# Patient Record
Sex: FEMALE | Race: WHITE | NOT HISPANIC OR LATINO | Employment: OTHER | URBAN - METROPOLITAN AREA
[De-identification: names, ages, dates, MRNs, and addresses within clinical notes are randomized per-mention and may not be internally consistent; named-entity substitution may affect disease eponyms.]

---

## 2017-03-09 ENCOUNTER — HOSPITAL ENCOUNTER (OUTPATIENT)
Dept: CARDIOLOGY | Facility: HOSPITAL | Age: 72
Discharge: HOME | End: 2017-03-09
Attending: INTERNAL MEDICINE | Admitting: INTERNAL MEDICINE

## 2017-03-10 ENCOUNTER — OFFICE (AMBULATORY)
Dept: URBAN - METROPOLITAN AREA CLINIC 64 | Facility: CLINIC | Age: 72
End: 2017-03-10

## 2017-03-10 VITALS
SYSTOLIC BLOOD PRESSURE: 132 MMHG | HEART RATE: 56 BPM | HEIGHT: 61 IN | DIASTOLIC BLOOD PRESSURE: 80 MMHG | WEIGHT: 164 LBS

## 2017-03-10 DIAGNOSIS — R19.5 OTHER FECAL ABNORMALITIES: ICD-10-CM

## 2017-03-10 DIAGNOSIS — K59.1 FUNCTIONAL DIARRHEA: ICD-10-CM

## 2017-03-10 DIAGNOSIS — Z80.0 FAMILY HISTORY OF MALIGNANT NEOPLASM OF DIGESTIVE ORGANS: ICD-10-CM

## 2017-03-10 PROCEDURE — 99213 OFFICE O/P EST LOW 20 MIN: CPT | Performed by: NURSE PRACTITIONER

## 2017-04-05 ENCOUNTER — ON CAMPUS - OUTPATIENT (AMBULATORY)
Dept: URBAN - METROPOLITAN AREA HOSPITAL 2 | Facility: HOSPITAL | Age: 72
End: 2017-04-05

## 2017-04-05 ENCOUNTER — HOSPITAL ENCOUNTER (OUTPATIENT)
Dept: OTHER | Facility: HOSPITAL | Age: 72
Setting detail: SPECIMEN
Discharge: HOME | End: 2017-04-05
Attending: INTERNAL MEDICINE | Admitting: INTERNAL MEDICINE

## 2017-04-05 ENCOUNTER — OFFICE (AMBULATORY)
Dept: URBAN - METROPOLITAN AREA CLINIC 64 | Facility: CLINIC | Age: 72
End: 2017-04-05

## 2017-04-05 VITALS
DIASTOLIC BLOOD PRESSURE: 93 MMHG | SYSTOLIC BLOOD PRESSURE: 118 MMHG | RESPIRATION RATE: 16 BRPM | SYSTOLIC BLOOD PRESSURE: 139 MMHG | HEIGHT: 61 IN | SYSTOLIC BLOOD PRESSURE: 123 MMHG | HEART RATE: 63 BPM | DIASTOLIC BLOOD PRESSURE: 81 MMHG | HEART RATE: 58 BPM | DIASTOLIC BLOOD PRESSURE: 78 MMHG | OXYGEN SATURATION: 100 % | HEART RATE: 64 BPM | HEART RATE: 60 BPM | TEMPERATURE: 97.5 F | DIASTOLIC BLOOD PRESSURE: 42 MMHG | SYSTOLIC BLOOD PRESSURE: 130 MMHG | RESPIRATION RATE: 18 BRPM | SYSTOLIC BLOOD PRESSURE: 140 MMHG | OXYGEN SATURATION: 96 % | DIASTOLIC BLOOD PRESSURE: 87 MMHG | DIASTOLIC BLOOD PRESSURE: 79 MMHG | DIASTOLIC BLOOD PRESSURE: 84 MMHG | HEART RATE: 59 BPM | SYSTOLIC BLOOD PRESSURE: 114 MMHG | SYSTOLIC BLOOD PRESSURE: 138 MMHG | OXYGEN SATURATION: 97 % | WEIGHT: 158 LBS | DIASTOLIC BLOOD PRESSURE: 85 MMHG

## 2017-04-05 DIAGNOSIS — D12.0 BENIGN NEOPLASM OF CECUM: ICD-10-CM

## 2017-04-05 DIAGNOSIS — K63.5 POLYP OF COLON: ICD-10-CM

## 2017-04-05 DIAGNOSIS — D12.4 BENIGN NEOPLASM OF DESCENDING COLON: ICD-10-CM

## 2017-04-05 DIAGNOSIS — Z86.010 PERSONAL HISTORY OF COLONIC POLYPS: ICD-10-CM

## 2017-04-05 DIAGNOSIS — K57.30 DIVERTICULOSIS OF LARGE INTESTINE WITHOUT PERFORATION OR ABS: ICD-10-CM

## 2017-04-05 PROBLEM — D12.3 BENIGN NEOPLASM OF TRANSVERSE COLON: Status: ACTIVE | Noted: 2017-04-05

## 2017-04-05 LAB
GI HISTOLOGY: A. UNSPECIFIED: (no result)
GI HISTOLOGY: B. UNSPECIFIED: (no result)
GI HISTOLOGY: C. UNSPECIFIED: (no result)
GI HISTOLOGY: PDF REPORT: (no result)

## 2017-04-05 PROCEDURE — 88305 TISSUE EXAM BY PATHOLOGIST: CPT | Mod: 26 | Performed by: INTERNAL MEDICINE

## 2017-04-05 PROCEDURE — 45385 COLONOSCOPY W/LESION REMOVAL: CPT | Mod: PT | Performed by: INTERNAL MEDICINE

## 2017-04-05 RX ADMIN — PROPOFOL: 10 INJECTION, EMULSION INTRAVENOUS at 07:36

## 2017-05-15 ENCOUNTER — HOSPITAL ENCOUNTER (OUTPATIENT)
Dept: OTHER | Facility: HOSPITAL | Age: 72
Discharge: HOME | End: 2017-05-15
Attending: PREVENTIVE MEDICINE | Admitting: PREVENTIVE MEDICINE

## 2017-06-30 ENCOUNTER — HOSPITAL ENCOUNTER (OUTPATIENT)
Dept: CARDIOLOGY | Facility: HOSPITAL | Age: 72
Discharge: HOME | End: 2017-06-30
Attending: PHYSICIAN ASSISTANT | Admitting: PHYSICIAN ASSISTANT

## 2017-06-30 LAB — CREAT BLDA-MCNC: 0.8 MG/DL (ref 0.6–1.3)

## 2017-09-18 ENCOUNTER — OFFICE (AMBULATORY)
Dept: URBAN - METROPOLITAN AREA CLINIC 64 | Facility: CLINIC | Age: 72
End: 2017-09-18
Payer: MEDICAID

## 2017-09-18 VITALS
SYSTOLIC BLOOD PRESSURE: 122 MMHG | HEART RATE: 57 BPM | HEIGHT: 61 IN | WEIGHT: 160 LBS | DIASTOLIC BLOOD PRESSURE: 75 MMHG

## 2017-09-18 DIAGNOSIS — K21.9 GASTRO-ESOPHAGEAL REFLUX DISEASE WITHOUT ESOPHAGITIS: ICD-10-CM

## 2017-09-18 PROCEDURE — 99212 OFFICE O/P EST SF 10 MIN: CPT | Performed by: INTERNAL MEDICINE

## 2017-09-18 RX ORDER — PANTOPRAZOLE SODIUM 20 MG/1
20 TABLET, DELAYED RELEASE ORAL
Qty: 90 | Refills: 0 | Status: COMPLETED
Start: 2017-09-18 | End: 2020-01-01

## 2017-10-24 ENCOUNTER — HOSPITAL ENCOUNTER (OUTPATIENT)
Dept: CARDIOLOGY | Facility: HOSPITAL | Age: 72
Discharge: HOME | End: 2017-10-24
Attending: PHYSICIAN ASSISTANT | Admitting: PHYSICIAN ASSISTANT

## 2017-11-08 ENCOUNTER — HOSPITAL ENCOUNTER (OUTPATIENT)
Dept: PHYSICAL THERAPY | Facility: HOSPITAL | Age: 72
Setting detail: RECURRING SERIES
Discharge: HOME | End: 2018-01-09
Attending: PHYSICIAN ASSISTANT | Admitting: PHYSICIAN ASSISTANT

## 2017-11-13 ENCOUNTER — HOSPITAL ENCOUNTER (OUTPATIENT)
Dept: CARDIOLOGY | Facility: HOSPITAL | Age: 72
Discharge: HOME | End: 2017-11-13
Attending: INTERNAL MEDICINE | Admitting: INTERNAL MEDICINE

## 2017-11-13 ENCOUNTER — HOSPITAL ENCOUNTER (OUTPATIENT)
Dept: OTHER | Facility: HOSPITAL | Age: 72
Discharge: HOME | End: 2017-11-13
Attending: PREVENTIVE MEDICINE | Admitting: PREVENTIVE MEDICINE

## 2018-07-20 ENCOUNTER — HOSPITAL ENCOUNTER (OUTPATIENT)
Dept: FAMILY MEDICINE CLINIC | Facility: CLINIC | Age: 73
Setting detail: SPECIMEN
Discharge: HOME | End: 2018-07-20
Attending: PREVENTIVE MEDICINE | Admitting: PREVENTIVE MEDICINE

## 2018-07-20 LAB
BASOPHILS # BLD AUTO: 0 10*3/UL (ref 0–0.2)
BASOPHILS NFR BLD AUTO: 1 % (ref 0–2)
CHOLEST SERPL-MCNC: 123 MG/DL
CHOLEST/HDLC SERPL: 3.9 {RATIO}
CONV LDL CHOLESTEROL DIRECT: 69 MG/DL (ref 0–100)
CRP SERPL-MCNC: 0.13 MG/DL (ref 0–0.7)
DIFFERENTIAL METHOD BLD: (no result)
EOSINOPHIL # BLD AUTO: 0.1 10*3/UL (ref 0–0.3)
EOSINOPHIL # BLD AUTO: 1 % (ref 0–3)
ERYTHROCYTE [DISTWIDTH] IN BLOOD BY AUTOMATED COUNT: 12.8 % (ref 11.5–14.5)
ERYTHROCYTE [SEDIMENTATION RATE] IN BLOOD BY WESTERGREN METHOD: 11 MM/HR (ref 0–30)
HCT VFR BLD AUTO: 43.7 % (ref 35–49)
HDLC SERPL-MCNC: 32 MG/DL
HGB BLD-MCNC: 14.8 G/DL (ref 12–15)
LDLC/HDLC SERPL: 2.2 {RATIO}
LIPID INTERPRETATION: ABNORMAL
LYMPHOCYTES # BLD AUTO: 1.8 10*3/UL (ref 0.8–4.8)
LYMPHOCYTES NFR BLD AUTO: 31 % (ref 18–42)
MCH RBC QN AUTO: 30.9 PG (ref 26–32)
MCHC RBC AUTO-ENTMCNC: 33.9 G/DL (ref 32–36)
MCV RBC AUTO: 91.1 FL (ref 80–94)
MONOCYTES # BLD AUTO: 0.6 10*3/UL (ref 0.1–1.3)
MONOCYTES NFR BLD AUTO: 10 % (ref 2–11)
NEUTROPHILS # BLD AUTO: 3.3 10*3/UL (ref 2.3–8.6)
NEUTROPHILS NFR BLD AUTO: 57 % (ref 50–75)
NRBC BLD AUTO-RTO: 0 /100{WBCS}
NRBC/RBC NFR BLD MANUAL: 0 10*3/UL
PLATELET # BLD AUTO: 186 10*3/UL (ref 150–450)
PMV BLD AUTO: 10.3 FL (ref 7.4–10.4)
RBC # BLD AUTO: 4.79 10*6/UL (ref 4–5.4)
TRIGL SERPL-MCNC: 122 MG/DL
VLDLC SERPL CALC-MCNC: 22.2 MG/DL
WBC # BLD AUTO: 5.8 10*3/UL (ref 4.5–11.5)

## 2018-07-23 LAB
ANA SER QL IA: ABNORMAL
CHROMATIN AB SERPL-ACNC: <20 [IU]/ML (ref 0–20)

## 2018-07-25 ENCOUNTER — HOSPITAL ENCOUNTER (OUTPATIENT)
Dept: GENERAL RADIOLOGY | Facility: HOSPITAL | Age: 73
Discharge: HOME | End: 2018-07-25
Attending: PREVENTIVE MEDICINE | Admitting: PREVENTIVE MEDICINE

## 2018-09-19 ENCOUNTER — OFFICE (AMBULATORY)
Dept: URBAN - METROPOLITAN AREA CLINIC 64 | Facility: CLINIC | Age: 73
End: 2018-09-19

## 2018-09-19 VITALS
SYSTOLIC BLOOD PRESSURE: 109 MMHG | DIASTOLIC BLOOD PRESSURE: 68 MMHG | HEIGHT: 61 IN | WEIGHT: 175 LBS | HEART RATE: 62 BPM

## 2018-09-19 DIAGNOSIS — K21.9 GASTRO-ESOPHAGEAL REFLUX DISEASE WITHOUT ESOPHAGITIS: ICD-10-CM

## 2018-09-19 PROCEDURE — 99213 OFFICE O/P EST LOW 20 MIN: CPT | Performed by: INTERNAL MEDICINE

## 2018-09-19 RX ORDER — PANTOPRAZOLE SODIUM 20 MG/1
20 TABLET, DELAYED RELEASE ORAL
Qty: 90 | Refills: 0 | Status: COMPLETED
Start: 2017-09-18 | End: 2020-01-01

## 2018-10-24 ENCOUNTER — HOSPITAL ENCOUNTER (OUTPATIENT)
Dept: CARDIOLOGY | Facility: HOSPITAL | Age: 73
Discharge: HOME | End: 2018-10-24
Attending: PHYSICIAN ASSISTANT | Admitting: PHYSICIAN ASSISTANT

## 2018-11-06 ENCOUNTER — HOSPITAL ENCOUNTER (OUTPATIENT)
Dept: LAB | Facility: HOSPITAL | Age: 73
Discharge: HOME | End: 2018-11-06
Attending: INTERNAL MEDICINE | Admitting: INTERNAL MEDICINE

## 2018-11-06 LAB
ALBUMIN SERPL-MCNC: 4.1 G/DL (ref 3.5–4.8)
ALBUMIN/GLOB SERPL: 1.3 {RATIO} (ref 1–1.7)
ALP SERPL-CCNC: 139 IU/L (ref 32–91)
ALT SERPL-CCNC: 18 IU/L (ref 14–54)
ANION GAP SERPL CALC-SCNC: 13.4 MMOL/L (ref 10–20)
AST SERPL-CCNC: 20 IU/L (ref 15–41)
BASOPHILS # BLD AUTO: 0 10*3/UL (ref 0–0.2)
BASOPHILS NFR BLD AUTO: 1 % (ref 0–2)
BILIRUB SERPL-MCNC: 1.3 MG/DL (ref 0.3–1.2)
BILIRUB UR QL STRIP: NEGATIVE MG/DL
BUN SERPL-MCNC: 10 MG/DL (ref 8–20)
BUN/CREAT SERPL: 10 (ref 5.4–26.2)
CALCIUM SERPL-MCNC: 9.6 MG/DL (ref 8.9–10.3)
CASTS URNS QL MICRO: NORMAL /[LPF]
CHLORIDE SERPL-SCNC: 105 MMOL/L (ref 101–111)
COLOR UR: YELLOW
CONV BACTERIA IN URINE MICRO: NEGATIVE
CONV CLARITY OF URINE: CLEAR
CONV CO2: 28 MMOL/L (ref 22–32)
CONV HYALINE CASTS IN URINE MICRO: 1 /[LPF] (ref 0–5)
CONV PROTEIN IN URINE BY AUTOMATED TEST STRIP: NEGATIVE MG/DL
CONV SMALL ROUND CELLS: NORMAL /[HPF]
CONV TOTAL PROTEIN: 7.3 G/DL (ref 6.1–7.9)
CONV UROBILINOGEN IN URINE BY AUTOMATED TEST STRIP: 0.2 MG/DL
CREAT UR-MCNC: 1 MG/DL (ref 0.4–1)
CRP SERPL-MCNC: 0.18 MG/DL (ref 0–0.7)
CULTURE INDICATED?: NORMAL
DIFFERENTIAL METHOD BLD: (no result)
EOSINOPHIL # BLD AUTO: 0.1 10*3/UL (ref 0–0.3)
EOSINOPHIL # BLD AUTO: 2 % (ref 0–3)
ERYTHROCYTE [DISTWIDTH] IN BLOOD BY AUTOMATED COUNT: 12.5 % (ref 11.5–14.5)
ERYTHROCYTE [SEDIMENTATION RATE] IN BLOOD BY WESTERGREN METHOD: 11 MM/HR (ref 0–30)
GLOBULIN UR ELPH-MCNC: 3.2 G/DL (ref 2.5–3.8)
GLUCOSE SERPL-MCNC: 96 MG/DL (ref 65–99)
GLUCOSE UR QL: NEGATIVE MG/DL
HAV IGM SERPL QL IA: NONREACTIVE
HBV CORE IGM SERPL QL IA: NONREACTIVE
HBV SURFACE AG SERPL QL IA: NONREACTIVE
HCT VFR BLD AUTO: 45.8 % (ref 35–49)
HCV AB SER DONR QL: ABNORMAL
HGB BLD-MCNC: 15.4 G/DL (ref 12–15)
HGB UR QL STRIP: NEGATIVE
KETONES UR QL STRIP: NEGATIVE MG/DL
LEUKOCYTE ESTERASE UR QL STRIP: NEGATIVE
LYMPHOCYTES # BLD AUTO: 2.1 10*3/UL (ref 0.8–4.8)
LYMPHOCYTES NFR BLD AUTO: 32 % (ref 18–42)
MCH RBC QN AUTO: 30.5 PG (ref 26–32)
MCHC RBC AUTO-ENTMCNC: 33.6 G/DL (ref 32–36)
MCV RBC AUTO: 90.9 FL (ref 80–94)
MONOCYTES # BLD AUTO: 0.7 10*3/UL (ref 0.1–1.3)
MONOCYTES NFR BLD AUTO: 10 % (ref 2–11)
NEUTROPHILS # BLD AUTO: 3.7 10*3/UL (ref 2.3–8.6)
NEUTROPHILS NFR BLD AUTO: 55 % (ref 50–75)
NITRITE UR QL STRIP: NEGATIVE
NRBC BLD AUTO-RTO: 0 /100{WBCS}
NRBC/RBC NFR BLD MANUAL: 0 10*3/UL
PH UR STRIP.AUTO: 5 [PH] (ref 4.5–8)
PLATELET # BLD AUTO: 190 10*3/UL (ref 150–450)
PMV BLD AUTO: 9.8 FL (ref 7.4–10.4)
POTASSIUM SERPL-SCNC: 4.4 MMOL/L (ref 3.6–5.1)
RBC # BLD AUTO: 5.04 10*6/UL (ref 4–5.4)
RBC #/AREA URNS HPF: 0 /[HPF] (ref 0–3)
SODIUM SERPL-SCNC: 142 MMOL/L (ref 136–144)
SP GR UR: 1.01 (ref 1–1.03)
SPERM URNS QL MICRO: NORMAL /[HPF]
SQUAMOUS SPT QL MICRO: 0 /[HPF] (ref 0–5)
UNIDENT CRYS URNS QL MICRO: NORMAL /[HPF]
WBC # BLD AUTO: 6.7 10*3/UL (ref 4.5–11.5)
WBC #/AREA URNS HPF: 1 /[HPF] (ref 0–5)
YEAST SPEC QL WET PREP: NORMAL /[HPF]

## 2018-11-06 PROCEDURE — 86481 TB AG RESPONSE T-CELL SUSP: CPT

## 2018-11-07 ENCOUNTER — LAB REQUISITION (OUTPATIENT)
Dept: LAB | Facility: HOSPITAL | Age: 73
End: 2018-11-07

## 2018-11-07 DIAGNOSIS — Z00.00 ROUTINE GENERAL MEDICAL EXAMINATION AT A HEALTH CARE FACILITY: ICD-10-CM

## 2018-11-07 LAB
CONV HIV-1/ HIV-2: NORMAL
CONV HIV-1/ HIV-2: NORMAL

## 2018-11-08 LAB
TSPOT INTERPRETATION: ABNORMAL
TSPOT INTERPRETATION: POSITIVE
TSPOT NIL CONTROL INTERPRETATION: ABNORMAL
TSPOT PANEL A: 1
TSPOT PANEL B: 18
TSPOT POS CONTROL INTERPRETATION: ABNORMAL

## 2018-11-10 LAB
ANTI-CARDIOLIPIN IGG ANTIBODY: <14 GPL
INR PPP: 1.1
PTT LA MIX: 35 SEC
SCREEN DRVVT: 42 SEC

## 2018-11-13 ENCOUNTER — HOSPITAL ENCOUNTER (OUTPATIENT)
Dept: OTHER | Facility: HOSPITAL | Age: 73
Discharge: HOME | End: 2018-11-13
Attending: PREVENTIVE MEDICINE | Admitting: PREVENTIVE MEDICINE

## 2019-01-01 ENCOUNTER — HOSPITAL ENCOUNTER (INPATIENT)
Facility: HOSPITAL | Age: 74
LOS: 2 days | Discharge: HOME | End: 2019-09-20
Attending: EMERGENCY MEDICINE | Admitting: HOSPITALIST

## 2019-01-01 ENCOUNTER — READMISSION MANAGEMENT (OUTPATIENT)
Dept: CALL CENTER | Facility: HOSPITAL | Age: 74
End: 2019-01-01

## 2019-01-01 ENCOUNTER — TELEPHONE (OUTPATIENT)
Dept: CARDIOLOGY | Facility: CLINIC | Age: 74
End: 2019-01-01

## 2019-01-01 ENCOUNTER — OFFICE VISIT (OUTPATIENT)
Dept: CARDIOLOGY | Facility: CLINIC | Age: 74
End: 2019-01-01

## 2019-01-01 ENCOUNTER — CLINICAL SUPPORT NO REQUIREMENTS (OUTPATIENT)
Dept: CARDIOLOGY | Facility: CLINIC | Age: 74
End: 2019-01-01

## 2019-01-01 ENCOUNTER — TRANSCRIBE ORDERS (OUTPATIENT)
Dept: LAB | Facility: HOSPITAL | Age: 74
End: 2019-01-01

## 2019-01-01 ENCOUNTER — HOSPITAL ENCOUNTER (OUTPATIENT)
Facility: HOSPITAL | Age: 74
Setting detail: OBSERVATION
Discharge: HOME | End: 2019-09-25
Attending: EMERGENCY MEDICINE | Admitting: INTERNAL MEDICINE

## 2019-01-01 ENCOUNTER — HOSPITAL ENCOUNTER (OUTPATIENT)
Dept: CARDIOLOGY | Facility: HOSPITAL | Age: 74
Discharge: HOME | End: 2019-10-11

## 2019-01-01 ENCOUNTER — APPOINTMENT (OUTPATIENT)
Dept: MRI IMAGING | Facility: HOSPITAL | Age: 74
End: 2019-01-01

## 2019-01-01 ENCOUNTER — OFFICE (AMBULATORY)
Dept: URBAN - METROPOLITAN AREA CLINIC 64 | Facility: CLINIC | Age: 74
End: 2019-01-01

## 2019-01-01 ENCOUNTER — APPOINTMENT (OUTPATIENT)
Dept: CARDIOLOGY | Facility: HOSPITAL | Age: 74
End: 2019-01-01

## 2019-01-01 ENCOUNTER — APPOINTMENT (OUTPATIENT)
Dept: RESPIRATORY THERAPY | Facility: HOSPITAL | Age: 74
End: 2019-01-01

## 2019-01-01 ENCOUNTER — APPOINTMENT (OUTPATIENT)
Dept: GENERAL RADIOLOGY | Facility: HOSPITAL | Age: 74
End: 2019-01-01

## 2019-01-01 ENCOUNTER — LAB (OUTPATIENT)
Dept: LAB | Facility: HOSPITAL | Age: 74
End: 2019-01-01

## 2019-01-01 ENCOUNTER — HOSPITAL ENCOUNTER (OUTPATIENT)
Facility: HOSPITAL | Age: 74
Discharge: HOME | End: 2019-10-12
Attending: INTERNAL MEDICINE | Admitting: INTERNAL MEDICINE

## 2019-01-01 ENCOUNTER — OFFICE VISIT (OUTPATIENT)
Dept: RHEUMATOLOGY | Facility: CLINIC | Age: 74
End: 2019-01-01

## 2019-01-01 ENCOUNTER — TELEPHONE (OUTPATIENT)
Dept: RHEUMATOLOGY | Facility: CLINIC | Age: 74
End: 2019-01-01

## 2019-01-01 VITALS
TEMPERATURE: 97.4 F | DIASTOLIC BLOOD PRESSURE: 81 MMHG | HEART RATE: 49 BPM | OXYGEN SATURATION: 98 % | BODY MASS INDEX: 30.95 KG/M2 | SYSTOLIC BLOOD PRESSURE: 150 MMHG | HEIGHT: 62 IN | WEIGHT: 168.21 LBS | RESPIRATION RATE: 16 BRPM

## 2019-01-01 VITALS
SYSTOLIC BLOOD PRESSURE: 154 MMHG | OXYGEN SATURATION: 97 % | DIASTOLIC BLOOD PRESSURE: 84 MMHG | HEART RATE: 42 BPM | HEIGHT: 62 IN | RESPIRATION RATE: 18 BRPM | TEMPERATURE: 97.6 F | WEIGHT: 164.46 LBS | BODY MASS INDEX: 30.26 KG/M2

## 2019-01-01 VITALS
DIASTOLIC BLOOD PRESSURE: 78 MMHG | BODY MASS INDEX: 31.37 KG/M2 | OXYGEN SATURATION: 96 % | HEART RATE: 46 BPM | SYSTOLIC BLOOD PRESSURE: 161 MMHG | WEIGHT: 171.5 LBS

## 2019-01-01 VITALS
SYSTOLIC BLOOD PRESSURE: 143 MMHG | HEART RATE: 43 BPM | DIASTOLIC BLOOD PRESSURE: 80 MMHG | HEIGHT: 61 IN | WEIGHT: 164 LBS

## 2019-01-01 VITALS
HEIGHT: 62 IN | OXYGEN SATURATION: 96 % | WEIGHT: 170 LBS | BODY MASS INDEX: 31.28 KG/M2 | SYSTOLIC BLOOD PRESSURE: 156 MMHG | HEART RATE: 80 BPM | DIASTOLIC BLOOD PRESSURE: 101 MMHG

## 2019-01-01 VITALS
SYSTOLIC BLOOD PRESSURE: 154 MMHG | HEART RATE: 90 BPM | OXYGEN SATURATION: 96 % | RESPIRATION RATE: 16 BRPM | HEIGHT: 62 IN | BODY MASS INDEX: 30.87 KG/M2 | WEIGHT: 167.77 LBS | TEMPERATURE: 98.1 F | DIASTOLIC BLOOD PRESSURE: 72 MMHG

## 2019-01-01 VITALS
HEIGHT: 62 IN | HEART RATE: 67 BPM | DIASTOLIC BLOOD PRESSURE: 71 MMHG | WEIGHT: 176.25 LBS | OXYGEN SATURATION: 95 % | SYSTOLIC BLOOD PRESSURE: 114 MMHG | BODY MASS INDEX: 32.44 KG/M2

## 2019-01-01 VITALS
BODY MASS INDEX: 32.24 KG/M2 | SYSTOLIC BLOOD PRESSURE: 142 MMHG | DIASTOLIC BLOOD PRESSURE: 70 MMHG | HEART RATE: 44 BPM | HEIGHT: 62 IN

## 2019-01-01 DIAGNOSIS — Z95.0 PRESENCE OF PERMANENT CARDIAC PACEMAKER: ICD-10-CM

## 2019-01-01 DIAGNOSIS — I10 ESSENTIAL HYPERTENSION: ICD-10-CM

## 2019-01-01 DIAGNOSIS — M13.80 CLIMACTERIC ARTHRITIS: ICD-10-CM

## 2019-01-01 DIAGNOSIS — R55 SYNCOPE, UNSPECIFIED SYNCOPE TYPE: Primary | ICD-10-CM

## 2019-01-01 DIAGNOSIS — Z95.818 STATUS POST PLACEMENT OF IMPLANTABLE LOOP RECORDER: ICD-10-CM

## 2019-01-01 DIAGNOSIS — E78.2 MIXED HYPERLIPIDEMIA: ICD-10-CM

## 2019-01-01 DIAGNOSIS — R42 DIZZINESS: ICD-10-CM

## 2019-01-01 DIAGNOSIS — M17.0 OSTEOARTHRITIS OF BOTH KNEES, UNSPECIFIED OSTEOARTHRITIS TYPE: ICD-10-CM

## 2019-01-01 DIAGNOSIS — I25.10 CORONARY ARTERY DISEASE INVOLVING NATIVE CORONARY ARTERY OF NATIVE HEART WITHOUT ANGINA PECTORIS: ICD-10-CM

## 2019-01-01 DIAGNOSIS — M06.9 RHEUMATOID ARTHRITIS, INVOLVING UNSPECIFIED SITE, UNSPECIFIED RHEUMATOID FACTOR PRESENCE: ICD-10-CM

## 2019-01-01 DIAGNOSIS — M06.9 RHEUMATOID ARTHRITIS, INVOLVING UNSPECIFIED SITE, UNSPECIFIED RHEUMATOID FACTOR PRESENCE: Primary | ICD-10-CM

## 2019-01-01 DIAGNOSIS — R55 SYNCOPE AND COLLAPSE: ICD-10-CM

## 2019-01-01 DIAGNOSIS — Z95.0 PRESENCE OF PERMANENT CARDIAC PACEMAKER: Primary | ICD-10-CM

## 2019-01-01 DIAGNOSIS — I48.0 PAROXYSMAL ATRIAL FIBRILLATION (HCC): ICD-10-CM

## 2019-01-01 DIAGNOSIS — I49.5 SSS (SICK SINUS SYNDROME) (HCC): ICD-10-CM

## 2019-01-01 DIAGNOSIS — R00.1 BRADYCARDIA: Primary | ICD-10-CM

## 2019-01-01 DIAGNOSIS — K21.9 GASTRO-ESOPHAGEAL REFLUX DISEASE WITHOUT ESOPHAGITIS: ICD-10-CM

## 2019-01-01 DIAGNOSIS — M25.561 CHRONIC PAIN OF RIGHT KNEE: ICD-10-CM

## 2019-01-01 DIAGNOSIS — R94.5 LIVER FUNCTION ABNORMALITY: ICD-10-CM

## 2019-01-01 DIAGNOSIS — Z95.818 STATUS POST PLACEMENT OF IMPLANTABLE LOOP RECORDER: Primary | ICD-10-CM

## 2019-01-01 DIAGNOSIS — R55 SYNCOPE, UNSPECIFIED SYNCOPE TYPE: ICD-10-CM

## 2019-01-01 DIAGNOSIS — Z95.0 PACEMAKER: ICD-10-CM

## 2019-01-01 DIAGNOSIS — E78.5 DYSLIPIDEMIA: ICD-10-CM

## 2019-01-01 DIAGNOSIS — R55 NEAR SYNCOPE: Primary | ICD-10-CM

## 2019-01-01 DIAGNOSIS — Z95.1 HX OF CABG: Primary | ICD-10-CM

## 2019-01-01 DIAGNOSIS — R07.9 CHEST PAIN, UNSPECIFIED TYPE: Primary | ICD-10-CM

## 2019-01-01 DIAGNOSIS — I49.8 VENTRICULAR BIGEMINY: ICD-10-CM

## 2019-01-01 DIAGNOSIS — R15.9 FULL INCONTINENCE OF FECES: ICD-10-CM

## 2019-01-01 DIAGNOSIS — Z79.899 LONG-TERM USE OF HIGH-RISK MEDICATION: ICD-10-CM

## 2019-01-01 DIAGNOSIS — M85.80 OSTEOPENIA, UNSPECIFIED LOCATION: ICD-10-CM

## 2019-01-01 DIAGNOSIS — R00.1 BRADYCARDIA: ICD-10-CM

## 2019-01-01 DIAGNOSIS — Z95.0 PACEMAKER: Primary | ICD-10-CM

## 2019-01-01 DIAGNOSIS — R19.4 CHANGE IN BOWEL HABIT: ICD-10-CM

## 2019-01-01 DIAGNOSIS — Z86.010 PERSONAL HISTORY OF COLONIC POLYPS: ICD-10-CM

## 2019-01-01 DIAGNOSIS — F41.9 ANXIETY DISORDER, UNSPECIFIED: ICD-10-CM

## 2019-01-01 DIAGNOSIS — Z79.899 HIGH RISK MEDICATION USE: ICD-10-CM

## 2019-01-01 DIAGNOSIS — J44.9 CHRONIC OBSTRUCTIVE PULMONARY DISEASE, UNSPECIFIED: ICD-10-CM

## 2019-01-01 DIAGNOSIS — Z79.899 HIGH RISK MEDICATION USE: Primary | ICD-10-CM

## 2019-01-01 DIAGNOSIS — G89.29 CHRONIC PAIN OF RIGHT KNEE: ICD-10-CM

## 2019-01-01 DIAGNOSIS — R53.1 WEAKNESS: ICD-10-CM

## 2019-01-01 DIAGNOSIS — E87.6 HYPOKALEMIA: ICD-10-CM

## 2019-01-01 DIAGNOSIS — I49.5 SSS (SICK SINUS SYNDROME) (HCC): Primary | ICD-10-CM

## 2019-01-01 DIAGNOSIS — E03.9 ACQUIRED HYPOTHYROIDISM: ICD-10-CM

## 2019-01-01 LAB
ALBUMIN SERPL-MCNC: 3.4 G/DL (ref 3.5–4.8)
ALBUMIN SERPL-MCNC: 3.7 G/DL (ref 3.5–4.8)
ALBUMIN SERPL-MCNC: 3.8 G/DL (ref 3.5–4.8)
ALBUMIN/GLOB SERPL: 1.3 G/DL (ref 1–1.7)
ALBUMIN/GLOB SERPL: 1.3 G/DL (ref 1–1.7)
ALBUMIN/GLOB SERPL: 1.5 G/DL (ref 1–1.7)
ALP SERPL-CCNC: 81 U/L (ref 32–91)
ALP SERPL-CCNC: 93 U/L (ref 32–91)
ALP SERPL-CCNC: 95 U/L (ref 32–91)
ALT SERPL W P-5'-P-CCNC: 15 U/L (ref 14–54)
ALT SERPL W P-5'-P-CCNC: 16 U/L (ref 14–54)
ALT SERPL W P-5'-P-CCNC: 16 U/L (ref 14–54)
ANION GAP SERPL CALCULATED.3IONS-SCNC: 10.4 MMOL/L (ref 5–15)
ANION GAP SERPL CALCULATED.3IONS-SCNC: 12.5 MMOL/L (ref 5–15)
ANION GAP SERPL CALCULATED.3IONS-SCNC: 13 MMOL/L (ref 5–15)
ANION GAP SERPL CALCULATED.3IONS-SCNC: 13.4 MMOL/L (ref 5–15)
ANION GAP SERPL CALCULATED.3IONS-SCNC: 13.5 MMOL/L (ref 5–15)
ANION GAP SERPL CALCULATED.3IONS-SCNC: 14.4 MMOL/L (ref 5–15)
APTT PPP: 25.6 SECONDS (ref 24–31)
AST SERPL-CCNC: 21 U/L (ref 15–41)
AST SERPL-CCNC: 21 U/L (ref 15–41)
AST SERPL-CCNC: 23 U/L (ref 15–41)
BACTERIA UR QL AUTO: ABNORMAL /HPF
BASOPHILS # BLD AUTO: 0 10*3/MM3 (ref 0–0.2)
BASOPHILS NFR BLD AUTO: 0.3 % (ref 0–1.5)
BASOPHILS NFR BLD AUTO: 0.6 % (ref 0–1.5)
BASOPHILS NFR BLD AUTO: 0.7 % (ref 0–1.5)
BASOPHILS NFR BLD AUTO: 0.8 % (ref 0–1.5)
BILIRUB SERPL-MCNC: 0.7 MG/DL (ref 0.3–1.2)
BILIRUB SERPL-MCNC: 0.9 MG/DL (ref 0.3–1.2)
BILIRUB SERPL-MCNC: 0.9 MG/DL (ref 0.3–1.2)
BILIRUB UR QL STRIP: NEGATIVE
BUN BLD-MCNC: 10 MG/DL (ref 8–20)
BUN BLD-MCNC: 7 MG/DL (ref 8–20)
BUN BLD-MCNC: 7 MG/DL (ref 8–20)
BUN BLD-MCNC: 9 MG/DL (ref 8–20)
BUN BLD-MCNC: 9 MG/DL (ref 8–20)
BUN/CREAT SERPL: 10 (ref 5.4–26.2)
BUN/CREAT SERPL: 11.1 (ref 5.4–26.2)
BUN/CREAT SERPL: 11.1 (ref 5.4–26.2)
BUN/CREAT SERPL: 11.3 (ref 5.4–26.2)
BUN/CREAT SERPL: 12.5 (ref 5.4–26.2)
BUN/CREAT SERPL: 7 (ref 5.4–26.2)
BUN/CREAT SERPL: 7.8 (ref 5.4–26.2)
BUN/CREAT SERPL: 9 (ref 5.4–26.2)
CALCIUM SPEC-SCNC: 8.4 MG/DL (ref 8.9–10.3)
CALCIUM SPEC-SCNC: 8.4 MG/DL (ref 8.9–10.3)
CALCIUM SPEC-SCNC: 8.6 MG/DL (ref 8.9–10.3)
CALCIUM SPEC-SCNC: 8.7 MG/DL (ref 8.9–10.3)
CALCIUM SPEC-SCNC: 8.8 MG/DL (ref 8.9–10.3)
CALCIUM SPEC-SCNC: 9 MG/DL (ref 8.9–10.3)
CALCIUM SPEC-SCNC: 9.1 MG/DL (ref 8.9–10.3)
CALCIUM SPEC-SCNC: 9.5 MG/DL (ref 8.9–10.3)
CHLORIDE SERPL-SCNC: 101 MMOL/L (ref 101–111)
CHLORIDE SERPL-SCNC: 102 MMOL/L (ref 101–111)
CHLORIDE SERPL-SCNC: 104 MMOL/L (ref 101–111)
CHLORIDE SERPL-SCNC: 104 MMOL/L (ref 101–111)
CHLORIDE SERPL-SCNC: 107 MMOL/L (ref 101–111)
CHLORIDE SERPL-SCNC: 107 MMOL/L (ref 101–111)
CHLORIDE SERPL-SCNC: 108 MMOL/L (ref 101–111)
CHLORIDE SERPL-SCNC: 108 MMOL/L (ref 101–111)
CHROMATIN AB SERPL-ACNC: <20 IU/ML (ref 0–20)
CLARITY UR: ABNORMAL
CO2 SERPL-SCNC: 23 MMOL/L (ref 22–32)
CO2 SERPL-SCNC: 23 MMOL/L (ref 22–32)
CO2 SERPL-SCNC: 24 MMOL/L (ref 22–32)
CO2 SERPL-SCNC: 25 MMOL/L (ref 22–32)
CO2 SERPL-SCNC: 26 MMOL/L (ref 22–32)
CO2 SERPL-SCNC: 28 MMOL/L (ref 22–32)
CO2 SERPL-SCNC: 29 MMOL/L (ref 22–32)
CO2 SERPL-SCNC: 32 MMOL/L (ref 22–32)
COLOR UR: ABNORMAL
CREAT BLD-MCNC: 0.8 MG/DL (ref 0.4–1)
CREAT BLD-MCNC: 0.8 MG/DL (ref 0.4–1)
CREAT BLD-MCNC: 0.9 MG/DL (ref 0.4–1)
CREAT BLD-MCNC: 1 MG/DL (ref 0.4–1)
CRP SERPL-MCNC: 0.26 MG/DL (ref 0–0.7)
CYCLIC CITRULLINATED PEPTIDE ANTIBODY: < 0.4
DEPRECATED RDW RBC AUTO: 40.3 FL (ref 37–54)
DEPRECATED RDW RBC AUTO: 41.1 FL (ref 37–54)
DEPRECATED RDW RBC AUTO: 41.6 FL (ref 37–54)
DEPRECATED RDW RBC AUTO: 42 FL (ref 37–54)
DEPRECATED RDW RBC AUTO: 42.4 FL (ref 37–54)
DEPRECATED RDW RBC AUTO: 44.2 FL (ref 37–54)
EOSINOPHIL # BLD AUTO: 0.1 10*3/MM3 (ref 0–0.4)
EOSINOPHIL # BLD AUTO: 0.2 10*3/MM3 (ref 0–0.4)
EOSINOPHIL NFR BLD AUTO: 2.4 % (ref 0.3–6.2)
EOSINOPHIL NFR BLD AUTO: 2.4 % (ref 0.3–6.2)
EOSINOPHIL NFR BLD AUTO: 2.5 % (ref 0.3–6.2)
EOSINOPHIL NFR BLD AUTO: 2.7 % (ref 0.3–6.2)
ERYTHROCYTE [DISTWIDTH] IN BLOOD BY AUTOMATED COUNT: 12.6 % (ref 12.3–15.4)
ERYTHROCYTE [DISTWIDTH] IN BLOOD BY AUTOMATED COUNT: 12.7 % (ref 12.3–15.4)
ERYTHROCYTE [DISTWIDTH] IN BLOOD BY AUTOMATED COUNT: 12.9 % (ref 12.3–15.4)
ERYTHROCYTE [DISTWIDTH] IN BLOOD BY AUTOMATED COUNT: 13 % (ref 12.3–15.4)
ERYTHROCYTE [DISTWIDTH] IN BLOOD BY AUTOMATED COUNT: 13 % (ref 12.3–15.4)
ERYTHROCYTE [DISTWIDTH] IN BLOOD BY AUTOMATED COUNT: 13.4 % (ref 12.3–15.4)
ERYTHROCYTE [SEDIMENTATION RATE] IN BLOOD: 11 MM/HR (ref 0–30)
GFR SERPL CREATININE-BSD FRML MDRD: 55 ML/MIN/1.73
GFR SERPL CREATININE-BSD FRML MDRD: 62 ML/MIN/1.73
GFR SERPL CREATININE-BSD FRML MDRD: 71 ML/MIN/1.73
GFR SERPL CREATININE-BSD FRML MDRD: 71 ML/MIN/1.73
GLOBULIN UR ELPH-MCNC: 2.6 GM/DL (ref 2.5–3.8)
GLOBULIN UR ELPH-MCNC: 2.7 GM/DL (ref 2.5–3.8)
GLOBULIN UR ELPH-MCNC: 2.8 GM/DL (ref 2.5–3.8)
GLUCOSE BLD-MCNC: 101 MG/DL (ref 65–99)
GLUCOSE BLD-MCNC: 103 MG/DL (ref 65–99)
GLUCOSE BLD-MCNC: 104 MG/DL (ref 65–99)
GLUCOSE BLD-MCNC: 106 MG/DL (ref 65–99)
GLUCOSE BLD-MCNC: 111 MG/DL (ref 65–99)
GLUCOSE BLD-MCNC: 94 MG/DL (ref 65–99)
GLUCOSE BLD-MCNC: 97 MG/DL (ref 65–99)
GLUCOSE BLD-MCNC: 99 MG/DL (ref 65–99)
GLUCOSE BLDC GLUCOMTR-MCNC: 128 MG/DL (ref 70–105)
GLUCOSE BLDC GLUCOMTR-MCNC: 94 MG/DL (ref 70–105)
GLUCOSE UR STRIP-MCNC: NEGATIVE MG/DL
HBA1C MFR BLD: 5.4 % (ref 3.5–5.6)
HCT VFR BLD AUTO: 42.9 % (ref 34–46.6)
HCT VFR BLD AUTO: 43.7 % (ref 34–46.6)
HCT VFR BLD AUTO: 44.2 % (ref 34–46.6)
HCT VFR BLD AUTO: 45 % (ref 34–46.6)
HCT VFR BLD AUTO: 45.3 % (ref 34–46.6)
HCT VFR BLD AUTO: 45.6 % (ref 34–46.6)
HGB BLD-MCNC: 14.6 G/DL (ref 12–15.9)
HGB BLD-MCNC: 14.7 G/DL (ref 12–15.9)
HGB BLD-MCNC: 14.8 G/DL (ref 12–15.9)
HGB BLD-MCNC: 15.2 G/DL (ref 12–15.9)
HGB BLD-MCNC: 15.2 G/DL (ref 12–15.9)
HGB BLD-MCNC: 15.3 G/DL (ref 12–15.9)
HGB UR QL STRIP.AUTO: NEGATIVE
HYALINE CASTS UR QL AUTO: ABNORMAL /LPF
INR PPP: 1.03 (ref 0.9–1.1)
INR PPP: 1.13 (ref 0.9–1.1)
KETONES UR QL STRIP: NEGATIVE
LEUKOCYTE ESTERASE UR QL STRIP.AUTO: ABNORMAL
LYMPHOCYTES # BLD AUTO: 1.8 10*3/MM3 (ref 0.7–3.1)
LYMPHOCYTES # BLD AUTO: 1.9 10*3/MM3 (ref 0.7–3.1)
LYMPHOCYTES # BLD AUTO: 2.1 10*3/MM3 (ref 0.7–3.1)
LYMPHOCYTES # BLD AUTO: 2.3 10*3/MM3 (ref 0.7–3.1)
LYMPHOCYTES NFR BLD AUTO: 32.6 % (ref 19.6–45.3)
LYMPHOCYTES NFR BLD AUTO: 32.9 % (ref 19.6–45.3)
LYMPHOCYTES NFR BLD AUTO: 34 % (ref 19.6–45.3)
LYMPHOCYTES NFR BLD AUTO: 34.5 % (ref 19.6–45.3)
MAGNESIUM SERPL-MCNC: 1.7 MG/DL (ref 1.8–2.5)
MAGNESIUM SERPL-MCNC: 1.9 MG/DL (ref 1.8–2.5)
MAGNESIUM SERPL-MCNC: 1.9 MG/DL (ref 1.8–2.5)
MAGNESIUM SERPL-MCNC: 2 MG/DL (ref 1.8–2.5)
MAGNESIUM SERPL-MCNC: 2.2 MG/DL (ref 1.8–2.5)
MCH RBC QN AUTO: 30.9 PG (ref 26.6–33)
MCH RBC QN AUTO: 31 PG (ref 26.6–33)
MCH RBC QN AUTO: 31 PG (ref 26.6–33)
MCH RBC QN AUTO: 31.2 PG (ref 26.6–33)
MCH RBC QN AUTO: 31.4 PG (ref 26.6–33)
MCH RBC QN AUTO: 31.6 PG (ref 26.6–33)
MCHC RBC AUTO-ENTMCNC: 33.3 G/DL (ref 31.5–35.7)
MCHC RBC AUTO-ENTMCNC: 33.4 G/DL (ref 31.5–35.7)
MCHC RBC AUTO-ENTMCNC: 33.4 G/DL (ref 31.5–35.7)
MCHC RBC AUTO-ENTMCNC: 33.5 G/DL (ref 31.5–35.7)
MCHC RBC AUTO-ENTMCNC: 33.7 G/DL (ref 31.5–35.7)
MCHC RBC AUTO-ENTMCNC: 34.3 G/DL (ref 31.5–35.7)
MCV RBC AUTO: 91.6 FL (ref 79–97)
MCV RBC AUTO: 92.5 FL (ref 79–97)
MCV RBC AUTO: 92.8 FL (ref 79–97)
MCV RBC AUTO: 93.1 FL (ref 79–97)
MCV RBC AUTO: 93.2 FL (ref 79–97)
MCV RBC AUTO: 93.7 FL (ref 79–97)
MONOCYTES # BLD AUTO: 0.7 10*3/MM3 (ref 0.1–0.9)
MONOCYTES # BLD AUTO: 0.7 10*3/MM3 (ref 0.1–0.9)
MONOCYTES # BLD AUTO: 0.8 10*3/MM3 (ref 0.1–0.9)
MONOCYTES # BLD AUTO: 0.8 10*3/MM3 (ref 0.1–0.9)
MONOCYTES NFR BLD AUTO: 11.9 % (ref 5–12)
MONOCYTES NFR BLD AUTO: 12.5 % (ref 5–12)
MONOCYTES NFR BLD AUTO: 12.6 % (ref 5–12)
MONOCYTES NFR BLD AUTO: 12.9 % (ref 5–12)
MRSA SPEC QL CULT: NORMAL
NEUTROPHILS # BLD AUTO: 2.6 10*3/MM3 (ref 1.7–7)
NEUTROPHILS # BLD AUTO: 2.8 10*3/MM3 (ref 1.7–7)
NEUTROPHILS # BLD AUTO: 3.2 10*3/MM3 (ref 1.7–7)
NEUTROPHILS # BLD AUTO: 3.6 10*3/MM3 (ref 1.7–7)
NEUTROPHILS NFR BLD AUTO: 49.5 % (ref 42.7–76)
NEUTROPHILS NFR BLD AUTO: 50 % (ref 42.7–76)
NEUTROPHILS NFR BLD AUTO: 51.7 % (ref 42.7–76)
NEUTROPHILS NFR BLD AUTO: 52.5 % (ref 42.7–76)
NITRITE UR QL STRIP: NEGATIVE
NRBC BLD AUTO-RTO: 0 /100 WBC (ref 0–0.2)
NRBC BLD AUTO-RTO: 0 /100 WBC (ref 0–0.2)
NRBC BLD AUTO-RTO: 0.1 /100 WBC (ref 0–0.2)
NRBC BLD AUTO-RTO: 0.1 /100 WBC (ref 0–0.2)
PH UR STRIP.AUTO: 5.5 [PH] (ref 5–8)
PHOSPHATE SERPL-MCNC: 3.5 MG/DL (ref 2.4–4.7)
PLATELET # BLD AUTO: 168 10*3/MM3 (ref 140–450)
PLATELET # BLD AUTO: 169 10*3/MM3 (ref 140–450)
PLATELET # BLD AUTO: 179 10*3/MM3 (ref 140–450)
PLATELET # BLD AUTO: 180 10*3/MM3 (ref 140–450)
PLATELET # BLD AUTO: 189 10*3/MM3 (ref 140–450)
PLATELET # BLD AUTO: 191 10*3/MM3 (ref 140–450)
PMV BLD AUTO: 9.3 FL (ref 6–12)
PMV BLD AUTO: 9.5 FL (ref 6–12)
PMV BLD AUTO: 9.5 FL (ref 6–12)
PMV BLD AUTO: 9.6 FL (ref 6–12)
PMV BLD AUTO: 9.6 FL (ref 6–12)
PMV BLD AUTO: 9.7 FL (ref 6–12)
POTASSIUM BLD-SCNC: 3.4 MMOL/L (ref 3.6–5.1)
POTASSIUM BLD-SCNC: 3.5 MMOL/L (ref 3.6–5.1)
POTASSIUM BLD-SCNC: 4 MMOL/L (ref 3.6–5.1)
POTASSIUM BLD-SCNC: 4.4 MMOL/L (ref 3.6–5.1)
POTASSIUM BLD-SCNC: 4.4 MMOL/L (ref 3.6–5.1)
POTASSIUM BLD-SCNC: 4.5 MMOL/L (ref 3.6–5.1)
PROT SERPL-MCNC: 6.1 G/DL (ref 6.1–7.9)
PROT SERPL-MCNC: 6.4 G/DL (ref 6.1–7.9)
PROT SERPL-MCNC: 6.5 G/DL (ref 6.1–7.9)
PROT UR QL STRIP: NEGATIVE
PROTHROMBIN TIME: 10.6 SECONDS (ref 9.6–11.7)
PROTHROMBIN TIME: 11.6 SECONDS (ref 9.6–11.7)
RBC # BLD AUTO: 4.68 10*6/MM3 (ref 3.77–5.28)
RBC # BLD AUTO: 4.69 10*6/MM3 (ref 3.77–5.28)
RBC # BLD AUTO: 4.77 10*6/MM3 (ref 3.77–5.28)
RBC # BLD AUTO: 4.81 10*6/MM3 (ref 3.77–5.28)
RBC # BLD AUTO: 4.86 10*6/MM3 (ref 3.77–5.28)
RBC # BLD AUTO: 4.93 10*6/MM3 (ref 3.77–5.28)
RBC # UR: ABNORMAL /HPF
REF LAB TEST METHOD: ABNORMAL
SODIUM BLD-SCNC: 135 MMOL/L (ref 136–144)
SODIUM BLD-SCNC: 136 MMOL/L (ref 136–144)
SODIUM BLD-SCNC: 140 MMOL/L (ref 136–144)
SODIUM BLD-SCNC: 141 MMOL/L (ref 136–144)
SODIUM BLD-SCNC: 142 MMOL/L (ref 136–144)
SODIUM BLD-SCNC: 144 MMOL/L (ref 136–144)
SP GR UR STRIP: 1.01 (ref 1–1.03)
SQUAMOUS #/AREA URNS HPF: ABNORMAL /HPF
T4 FREE SERPL-MCNC: 0.84 NG/DL (ref 0.58–1.64)
TROPONIN I SERPL-MCNC: <0.03 NG/ML (ref 0–0.03)
TSH SERPL DL<=0.05 MIU/L-ACNC: 3.46 UIU/ML (ref 0.34–5.6)
TSH SERPL DL<=0.05 MIU/L-ACNC: 3.74 UIU/ML (ref 0.34–5.6)
TSH SERPL DL<=0.05 MIU/L-ACNC: 5.76 UIU/ML (ref 0.34–5.6)
TSH SERPL DL<=0.05 MIU/L-ACNC: 5.84 UIU/ML (ref 0.34–5.6)
UROBILINOGEN UR QL STRIP: ABNORMAL
WBC NRBC COR # BLD: 4.5 10*3/MM3 (ref 3.4–10.8)
WBC NRBC COR # BLD: 4.6 10*3/MM3 (ref 3.4–10.8)
WBC NRBC COR # BLD: 5.2 10*3/MM3 (ref 3.4–10.8)
WBC NRBC COR # BLD: 5.6 10*3/MM3 (ref 3.4–10.8)
WBC NRBC COR # BLD: 6.3 10*3/MM3 (ref 3.4–10.8)
WBC NRBC COR # BLD: 6.9 10*3/MM3 (ref 3.4–10.8)
WBC UR QL AUTO: ABNORMAL /HPF
WHOLE BLOOD HOLD SPECIMEN: NORMAL

## 2019-01-01 PROCEDURE — 85652 RBC SED RATE AUTOMATED: CPT

## 2019-01-01 PROCEDURE — 63710000001 ACETAMINOPHEN 325 MG TABLET: Performed by: INTERNAL MEDICINE

## 2019-01-01 PROCEDURE — 99214 OFFICE O/P EST MOD 30 MIN: CPT | Performed by: INTERNAL MEDICINE

## 2019-01-01 PROCEDURE — 93005 ELECTROCARDIOGRAM TRACING: CPT | Performed by: INTERNAL MEDICINE

## 2019-01-01 PROCEDURE — 25010000002 MIDAZOLAM PER 1 MG: Performed by: INTERNAL MEDICINE

## 2019-01-01 PROCEDURE — 25010000002 VANCOMYCIN 10 G RECONSTITUTED SOLUTION: Performed by: INTERNAL MEDICINE

## 2019-01-01 PROCEDURE — 93010 ELECTROCARDIOGRAM REPORT: CPT | Performed by: INTERNAL MEDICINE

## 2019-01-01 PROCEDURE — 33285 INSJ SUBQ CAR RHYTHM MNTR: CPT | Performed by: INTERNAL MEDICINE

## 2019-01-01 PROCEDURE — G0378 HOSPITAL OBSERVATION PER HR: HCPCS

## 2019-01-01 PROCEDURE — 33208 INSRT HEART PM ATRIAL & VENT: CPT | Performed by: INTERNAL MEDICINE

## 2019-01-01 PROCEDURE — 83735 ASSAY OF MAGNESIUM: CPT | Performed by: INTERNAL MEDICINE

## 2019-01-01 PROCEDURE — A9270 NON-COVERED ITEM OR SERVICE: HCPCS | Performed by: INTERNAL MEDICINE

## 2019-01-01 PROCEDURE — 63710000001 PANTOPRAZOLE 40 MG TABLET DELAYED-RELEASE: Performed by: INTERNAL MEDICINE

## 2019-01-01 PROCEDURE — 99222 1ST HOSP IP/OBS MODERATE 55: CPT | Performed by: INTERNAL MEDICINE

## 2019-01-01 PROCEDURE — 96372 THER/PROPH/DIAG INJ SC/IM: CPT

## 2019-01-01 PROCEDURE — 94640 AIRWAY INHALATION TREATMENT: CPT

## 2019-01-01 PROCEDURE — 86200 CCP ANTIBODY: CPT

## 2019-01-01 PROCEDURE — 83036 HEMOGLOBIN GLYCOSYLATED A1C: CPT | Performed by: INTERNAL MEDICINE

## 2019-01-01 PROCEDURE — 99024 POSTOP FOLLOW-UP VISIT: CPT | Performed by: INTERNAL MEDICINE

## 2019-01-01 PROCEDURE — 99153 MOD SED SAME PHYS/QHP EA: CPT | Performed by: INTERNAL MEDICINE

## 2019-01-01 PROCEDURE — 96365 THER/PROPH/DIAG IV INF INIT: CPT

## 2019-01-01 PROCEDURE — 94664 DEMO&/EVAL PT USE INHALER: CPT

## 2019-01-01 PROCEDURE — 63710000001 VITAMIN B-12 250 MCG TABLET: Performed by: INTERNAL MEDICINE

## 2019-01-01 PROCEDURE — 94799 UNLISTED PULMONARY SVC/PX: CPT

## 2019-01-01 PROCEDURE — 63710000001 ASPIRIN 81 MG CHEWABLE TABLET: Performed by: INTERNAL MEDICINE

## 2019-01-01 PROCEDURE — 36415 COLL VENOUS BLD VENIPUNCTURE: CPT

## 2019-01-01 PROCEDURE — 80048 BASIC METABOLIC PNL TOTAL CA: CPT | Performed by: NURSE PRACTITIONER

## 2019-01-01 PROCEDURE — 99215 OFFICE O/P EST HI 40 MIN: CPT | Performed by: INTERNAL MEDICINE

## 2019-01-01 PROCEDURE — 84443 ASSAY THYROID STIM HORMONE: CPT | Performed by: INTERNAL MEDICINE

## 2019-01-01 PROCEDURE — 85610 PROTHROMBIN TIME: CPT | Performed by: EMERGENCY MEDICINE

## 2019-01-01 PROCEDURE — 83735 ASSAY OF MAGNESIUM: CPT | Performed by: EMERGENCY MEDICINE

## 2019-01-01 PROCEDURE — 99232 SBSQ HOSP IP/OBS MODERATE 35: CPT | Performed by: INTERNAL MEDICINE

## 2019-01-01 PROCEDURE — 81001 URINALYSIS AUTO W/SCOPE: CPT

## 2019-01-01 PROCEDURE — 99238 HOSP IP/OBS DSCHRG MGMT 30/<: CPT | Performed by: HOSPITALIST

## 2019-01-01 PROCEDURE — 84484 ASSAY OF TROPONIN QUANT: CPT | Performed by: INTERNAL MEDICINE

## 2019-01-01 PROCEDURE — 84484 ASSAY OF TROPONIN QUANT: CPT | Performed by: NURSE PRACTITIONER

## 2019-01-01 PROCEDURE — 85025 COMPLETE CBC W/AUTO DIFF WBC: CPT

## 2019-01-01 PROCEDURE — 80053 COMPREHEN METABOLIC PANEL: CPT | Performed by: EMERGENCY MEDICINE

## 2019-01-01 PROCEDURE — 99225 PR SBSQ OBSERVATION CARE/DAY 25 MINUTES: CPT | Performed by: INTERNAL MEDICINE

## 2019-01-01 PROCEDURE — 25010000002 IOPAMIDOL 61 % SOLUTION: Performed by: INTERNAL MEDICINE

## 2019-01-01 PROCEDURE — 33286 RMVL SUBQ CAR RHYTHM MNTR: CPT | Performed by: INTERNAL MEDICINE

## 2019-01-01 PROCEDURE — 82962 GLUCOSE BLOOD TEST: CPT

## 2019-01-01 PROCEDURE — 85027 COMPLETE CBC AUTOMATED: CPT | Performed by: INTERNAL MEDICINE

## 2019-01-01 PROCEDURE — 85027 COMPLETE CBC AUTOMATED: CPT

## 2019-01-01 PROCEDURE — 99213 OFFICE O/P EST LOW 20 MIN: CPT | Performed by: INTERNAL MEDICINE

## 2019-01-01 PROCEDURE — 93225 XTRNL ECG REC<48 HRS REC: CPT

## 2019-01-01 PROCEDURE — 99219 PR INITIAL OBSERVATION CARE/DAY 50 MINUTES: CPT | Performed by: NURSE PRACTITIONER

## 2019-01-01 PROCEDURE — 99152 MOD SED SAME PHYS/QHP 5/>YRS: CPT | Performed by: INTERNAL MEDICINE

## 2019-01-01 PROCEDURE — 33285 INSJ SUBQ CAR RHYTHM MNTR: CPT

## 2019-01-01 PROCEDURE — 87081 CULTURE SCREEN ONLY: CPT

## 2019-01-01 PROCEDURE — 83735 ASSAY OF MAGNESIUM: CPT | Performed by: NURSE PRACTITIONER

## 2019-01-01 PROCEDURE — 71045 X-RAY EXAM CHEST 1 VIEW: CPT

## 2019-01-01 PROCEDURE — 63710000001 LEVOTHYROXINE 50 MCG TABLET: Performed by: INTERNAL MEDICINE

## 2019-01-01 PROCEDURE — 93227 XTRNL ECG REC<48 HR R&I: CPT | Performed by: INTERNAL MEDICINE

## 2019-01-01 PROCEDURE — 70551 MRI BRAIN STEM W/O DYE: CPT

## 2019-01-01 PROCEDURE — 94618 PULMONARY STRESS TESTING: CPT

## 2019-01-01 PROCEDURE — C1785 PMKR, DUAL, RATE-RESP: HCPCS | Performed by: INTERNAL MEDICINE

## 2019-01-01 PROCEDURE — 25010000002 MAGNESIUM SULFATE 2 GM/50ML SOLUTION: Performed by: EMERGENCY MEDICINE

## 2019-01-01 PROCEDURE — 99232 SBSQ HOSP IP/OBS MODERATE 35: CPT | Performed by: HOSPITALIST

## 2019-01-01 PROCEDURE — 85025 COMPLETE CBC W/AUTO DIFF WBC: CPT | Performed by: EMERGENCY MEDICINE

## 2019-01-01 PROCEDURE — C1769 GUIDE WIRE: HCPCS | Performed by: INTERNAL MEDICINE

## 2019-01-01 PROCEDURE — C1894 INTRO/SHEATH, NON-LASER: HCPCS | Performed by: INTERNAL MEDICINE

## 2019-01-01 PROCEDURE — 99284 EMERGENCY DEPT VISIT MOD MDM: CPT

## 2019-01-01 PROCEDURE — 85025 COMPLETE CBC W/AUTO DIFF WBC: CPT | Performed by: NURSE PRACTITIONER

## 2019-01-01 PROCEDURE — 80048 BASIC METABOLIC PNL TOTAL CA: CPT | Performed by: INTERNAL MEDICINE

## 2019-01-01 PROCEDURE — C1764 EVENT RECORDER, CARDIAC: HCPCS

## 2019-01-01 PROCEDURE — 85610 PROTHROMBIN TIME: CPT

## 2019-01-01 PROCEDURE — 84484 ASSAY OF TROPONIN QUANT: CPT | Performed by: EMERGENCY MEDICINE

## 2019-01-01 PROCEDURE — 63710000001 ATORVASTATIN 20 MG TABLET: Performed by: INTERNAL MEDICINE

## 2019-01-01 PROCEDURE — 63710000001 PREGABALIN 100 MG CAPSULE: Performed by: INTERNAL MEDICINE

## 2019-01-01 PROCEDURE — 84100 ASSAY OF PHOSPHORUS: CPT | Performed by: INTERNAL MEDICINE

## 2019-01-01 PROCEDURE — 93299 PR REM INTERROG ICPMS/SCRMS <30 D TECH REVIEW: CPT | Performed by: INTERNAL MEDICINE

## 2019-01-01 PROCEDURE — 80048 BASIC METABOLIC PNL TOTAL CA: CPT

## 2019-01-01 PROCEDURE — 99223 1ST HOSP IP/OBS HIGH 75: CPT | Performed by: INTERNAL MEDICINE

## 2019-01-01 PROCEDURE — C1898 LEAD, PMKR, OTHER THAN TRANS: HCPCS | Performed by: INTERNAL MEDICINE

## 2019-01-01 PROCEDURE — 63710000001 CLOPIDOGREL 75 MG TABLET: Performed by: INTERNAL MEDICINE

## 2019-01-01 PROCEDURE — 71046 X-RAY EXAM CHEST 2 VIEWS: CPT

## 2019-01-01 PROCEDURE — 93298 REM INTERROG DEV EVAL SCRMS: CPT | Performed by: INTERNAL MEDICINE

## 2019-01-01 PROCEDURE — 84443 ASSAY THYROID STIM HORMONE: CPT | Performed by: NURSE PRACTITIONER

## 2019-01-01 PROCEDURE — 93005 ELECTROCARDIOGRAM TRACING: CPT | Performed by: EMERGENCY MEDICINE

## 2019-01-01 PROCEDURE — 99217 PR OBSERVATION CARE DISCHARGE MANAGEMENT: CPT | Performed by: INTERNAL MEDICINE

## 2019-01-01 PROCEDURE — 84439 ASSAY OF FREE THYROXINE: CPT | Performed by: INTERNAL MEDICINE

## 2019-01-01 PROCEDURE — 85730 THROMBOPLASTIN TIME PARTIAL: CPT | Performed by: EMERGENCY MEDICINE

## 2019-01-01 PROCEDURE — 80053 COMPREHEN METABOLIC PANEL: CPT | Performed by: INTERNAL MEDICINE

## 2019-01-01 PROCEDURE — 63710000001 AMLODIPINE 5 MG TABLET: Performed by: INTERNAL MEDICINE

## 2019-01-01 PROCEDURE — 25010000002 FENTANYL CITRATE (PF) 100 MCG/2ML SOLUTION: Performed by: INTERNAL MEDICINE

## 2019-01-01 PROCEDURE — 80048 BASIC METABOLIC PNL TOTAL CA: CPT | Performed by: EMERGENCY MEDICINE

## 2019-01-01 PROCEDURE — 25010000002 ENOXAPARIN PER 10 MG: Performed by: NURSE PRACTITIONER

## 2019-01-01 PROCEDURE — 86140 C-REACTIVE PROTEIN: CPT

## 2019-01-01 PROCEDURE — 63710000001 SULFASALAZINE 500 MG TABLET: Performed by: INTERNAL MEDICINE

## 2019-01-01 PROCEDURE — 80053 COMPREHEN METABOLIC PANEL: CPT

## 2019-01-01 PROCEDURE — 93280 PM DEVICE PROGR EVAL DUAL: CPT | Performed by: INTERNAL MEDICINE

## 2019-01-01 PROCEDURE — 86431 RHEUMATOID FACTOR QUANT: CPT

## 2019-01-01 DEVICE — ENDOCARDIAL STEROID-ELUTING MR CONDITIONAL STYLET DELIVERED PACE/SENSE LEAD
Type: IMPLANTABLE DEVICE | Status: FUNCTIONAL
Brand: INGEVITY™ MRI

## 2019-01-01 DEVICE — PACEMAKER
Type: IMPLANTABLE DEVICE | Status: FUNCTIONAL
Brand: ACCOLADE™ MRI DR

## 2019-01-01 DEVICE — IMPLANTABLE DEVICE: Type: IMPLANTABLE DEVICE | Status: FUNCTIONAL

## 2019-01-01 RX ORDER — POTASSIUM CHLORIDE 20 MEQ/1
40 TABLET, EXTENDED RELEASE ORAL AS NEEDED
Status: DISCONTINUED | OUTPATIENT
Start: 2019-01-01 | End: 2019-01-01 | Stop reason: HOSPADM

## 2019-01-01 RX ORDER — BUDESONIDE AND FORMOTEROL FUMARATE DIHYDRATE 160; 4.5 UG/1; UG/1
2 AEROSOL RESPIRATORY (INHALATION)
Qty: 1 INHALER | Refills: 0 | Status: SHIPPED | OUTPATIENT
Start: 2019-01-01 | End: 2019-01-01

## 2019-01-01 RX ORDER — AMLODIPINE BESYLATE 5 MG/1
5 TABLET ORAL
Status: DISCONTINUED | OUTPATIENT
Start: 2019-01-01 | End: 2019-01-01 | Stop reason: HOSPADM

## 2019-01-01 RX ORDER — BUTALBITAL, ACETAMINOPHEN AND CAFFEINE 50; 325; 40 MG/1; MG/1; MG/1
1 TABLET ORAL
COMMUNITY
Start: 2014-10-27 | End: 2019-01-01

## 2019-01-01 RX ORDER — LISINOPRIL 20 MG/1
20 TABLET ORAL DAILY
COMMUNITY
Start: 2013-08-26 | End: 2019-01-01

## 2019-01-01 RX ORDER — ACETAMINOPHEN 160 MG/5ML
650 SOLUTION ORAL EVERY 4 HOURS PRN
Status: DISCONTINUED | OUTPATIENT
Start: 2019-01-01 | End: 2019-01-01 | Stop reason: HOSPADM

## 2019-01-01 RX ORDER — PANTOPRAZOLE SODIUM 20 MG/1
20 TABLET, DELAYED RELEASE ORAL
Qty: 90 | Refills: 3 | Status: ACTIVE
Start: 2019-01-01

## 2019-01-01 RX ORDER — ASPIRIN 81 MG/1
81 TABLET, CHEWABLE ORAL DAILY
Status: DISCONTINUED | OUTPATIENT
Start: 2019-01-01 | End: 2019-01-01 | Stop reason: HOSPADM

## 2019-01-01 RX ORDER — SULFASALAZINE 500 MG/1
1000 TABLET ORAL 2 TIMES DAILY
Status: DISCONTINUED | OUTPATIENT
Start: 2019-01-01 | End: 2019-01-01 | Stop reason: HOSPADM

## 2019-01-01 RX ORDER — ACETAMINOPHEN 325 MG/1
650 TABLET ORAL EVERY 4 HOURS PRN
Status: DISCONTINUED | OUTPATIENT
Start: 2019-01-01 | End: 2019-01-01 | Stop reason: HOSPADM

## 2019-01-01 RX ORDER — BUDESONIDE AND FORMOTEROL FUMARATE DIHYDRATE 160; 4.5 UG/1; UG/1
2 AEROSOL RESPIRATORY (INHALATION)
Status: DISCONTINUED | OUTPATIENT
Start: 2019-01-01 | End: 2019-01-01 | Stop reason: HOSPADM

## 2019-01-01 RX ORDER — PREGABALIN 100 MG/1
100 CAPSULE ORAL 3 TIMES DAILY
Status: DISCONTINUED | OUTPATIENT
Start: 2019-01-01 | End: 2019-01-01 | Stop reason: HOSPADM

## 2019-01-01 RX ORDER — PANTOPRAZOLE SODIUM 20 MG/1
20 TABLET, DELAYED RELEASE ORAL DAILY
Status: DISCONTINUED | OUTPATIENT
Start: 2019-01-01 | End: 2019-01-01

## 2019-01-01 RX ORDER — PANTOPRAZOLE SODIUM 40 MG/1
40 TABLET, DELAYED RELEASE ORAL
Status: DISCONTINUED | OUTPATIENT
Start: 2019-01-01 | End: 2019-01-01 | Stop reason: HOSPADM

## 2019-01-01 RX ORDER — CLOPIDOGREL BISULFATE 75 MG/1
75 TABLET ORAL DAILY
Status: DISCONTINUED | OUTPATIENT
Start: 2019-01-01 | End: 2019-01-01 | Stop reason: HOSPADM

## 2019-01-01 RX ORDER — ATORVASTATIN CALCIUM 20 MG/1
20 TABLET, FILM COATED ORAL NIGHTLY
Status: DISCONTINUED | OUTPATIENT
Start: 2019-01-01 | End: 2019-01-01 | Stop reason: HOSPADM

## 2019-01-01 RX ORDER — HYDROXYZINE HYDROCHLORIDE 25 MG/1
25 TABLET, FILM COATED ORAL
COMMUNITY
End: 2019-01-01

## 2019-01-01 RX ORDER — MAGNESIUM SULFATE HEPTAHYDRATE 40 MG/ML
2 INJECTION, SOLUTION INTRAVENOUS ONCE
Status: COMPLETED | OUTPATIENT
Start: 2019-01-01 | End: 2019-01-01

## 2019-01-01 RX ORDER — LEVOTHYROXINE SODIUM 0.03 MG/1
25 TABLET ORAL
Status: DISCONTINUED | OUTPATIENT
Start: 2019-01-01 | End: 2019-01-01 | Stop reason: HOSPADM

## 2019-01-01 RX ORDER — METOPROLOL SUCCINATE 25 MG/1
25 TABLET, EXTENDED RELEASE ORAL DAILY
COMMUNITY
End: 2019-01-01 | Stop reason: SDUPTHER

## 2019-01-01 RX ORDER — OMEPRAZOLE 20 MG/1
20 CAPSULE, DELAYED RELEASE ORAL DAILY
COMMUNITY
End: 2019-01-01

## 2019-01-01 RX ORDER — CLONAZEPAM 1 MG/1
1 TABLET ORAL
COMMUNITY
End: 2019-01-01

## 2019-01-01 RX ORDER — LIDOCAINE HYDROCHLORIDE AND EPINEPHRINE BITARTRATE 20; .01 MG/ML; MG/ML
INJECTION, SOLUTION SUBCUTANEOUS AS NEEDED
Status: DISCONTINUED | OUTPATIENT
Start: 2019-01-01 | End: 2019-01-01 | Stop reason: HOSPADM

## 2019-01-01 RX ORDER — FEXOFENADINE HCL 180 MG/1
180 TABLET ORAL
COMMUNITY
End: 2019-01-01

## 2019-01-01 RX ORDER — MAGNESIUM SULFATE HEPTAHYDRATE 40 MG/ML
2 INJECTION, SOLUTION INTRAVENOUS AS NEEDED
Status: DISCONTINUED | OUTPATIENT
Start: 2019-01-01 | End: 2019-01-01 | Stop reason: HOSPADM

## 2019-01-01 RX ORDER — LEVETIRACETAM 500 MG/1
500 TABLET ORAL EVERY 12 HOURS SCHEDULED
Status: DISCONTINUED | OUTPATIENT
Start: 2019-01-01 | End: 2019-01-01 | Stop reason: HOSPADM

## 2019-01-01 RX ORDER — POTASSIUM CHLORIDE 1.5 G/1.77G
40 POWDER, FOR SOLUTION ORAL AS NEEDED
Status: DISCONTINUED | OUTPATIENT
Start: 2019-01-01 | End: 2019-01-01 | Stop reason: HOSPADM

## 2019-01-01 RX ORDER — MIDAZOLAM HYDROCHLORIDE 1 MG/ML
INJECTION INTRAMUSCULAR; INTRAVENOUS AS NEEDED
Status: DISCONTINUED | OUTPATIENT
Start: 2019-01-01 | End: 2019-01-01 | Stop reason: HOSPADM

## 2019-01-01 RX ORDER — VITAMIN B COMPLEX
1 CAPSULE ORAL DAILY
COMMUNITY

## 2019-01-01 RX ORDER — AMLODIPINE BESYLATE 5 MG/1
5 TABLET ORAL
Qty: 30 TABLET | Refills: 5 | Status: SHIPPED | OUTPATIENT
Start: 2019-01-01 | End: 2020-01-01

## 2019-01-01 RX ORDER — MAGNESIUM SULFATE HEPTAHYDRATE 40 MG/ML
4 INJECTION, SOLUTION INTRAVENOUS AS NEEDED
Status: DISCONTINUED | OUTPATIENT
Start: 2019-01-01 | End: 2019-01-01 | Stop reason: HOSPADM

## 2019-01-01 RX ORDER — POTASSIUM CHLORIDE 20 MEQ/1
40 TABLET, EXTENDED RELEASE ORAL ONCE
Status: COMPLETED | OUTPATIENT
Start: 2019-01-01 | End: 2019-01-01

## 2019-01-01 RX ORDER — ASPIRIN 325 MG
325 TABLET ORAL ONCE
Status: COMPLETED | OUTPATIENT
Start: 2019-01-01 | End: 2019-01-01

## 2019-01-01 RX ORDER — SODIUM CHLORIDE 0.9 % (FLUSH) 0.9 %
10 SYRINGE (ML) INJECTION EVERY 12 HOURS SCHEDULED
Status: DISCONTINUED | OUTPATIENT
Start: 2019-01-01 | End: 2019-01-01 | Stop reason: HOSPADM

## 2019-01-01 RX ORDER — PANTOPRAZOLE SODIUM 20 MG/1
20 TABLET, DELAYED RELEASE ORAL
Status: DISCONTINUED | OUTPATIENT
Start: 2019-01-01 | End: 2019-01-01

## 2019-01-01 RX ORDER — BETAMETHASONE DIPROPIONATE 0.5 MG/G
CREAM TOPICAL
COMMUNITY
Start: 2016-11-18 | End: 2019-01-01

## 2019-01-01 RX ORDER — ONDANSETRON 2 MG/ML
4 INJECTION INTRAMUSCULAR; INTRAVENOUS EVERY 6 HOURS PRN
Status: DISCONTINUED | OUTPATIENT
Start: 2019-01-01 | End: 2019-01-01 | Stop reason: HOSPADM

## 2019-01-01 RX ORDER — SODIUM CHLORIDE 0.9 % (FLUSH) 0.9 %
10 SYRINGE (ML) INJECTION AS NEEDED
Status: DISCONTINUED | OUTPATIENT
Start: 2019-01-01 | End: 2019-01-01 | Stop reason: HOSPADM

## 2019-01-01 RX ORDER — METOPROLOL SUCCINATE 50 MG/1
100 TABLET, EXTENDED RELEASE ORAL DAILY
Status: DISCONTINUED | OUTPATIENT
Start: 2019-01-01 | End: 2019-01-01

## 2019-01-01 RX ORDER — CYANOCOBALAMIN (VITAMIN B-12) 250 MCG
250 TABLET ORAL DAILY
COMMUNITY

## 2019-01-01 RX ORDER — LIDOCAINE HYDROCHLORIDE AND EPINEPHRINE BITARTRATE 20; .01 MG/ML; MG/ML
20 INJECTION, SOLUTION SUBCUTANEOUS ONCE
Status: COMPLETED | OUTPATIENT
Start: 2019-01-01 | End: 2019-01-01

## 2019-01-01 RX ORDER — METOPROLOL TARTRATE 5 MG/5ML
2.5 INJECTION INTRAVENOUS EVERY 6 HOURS
Status: DISCONTINUED | OUTPATIENT
Start: 2019-01-01 | End: 2019-01-01

## 2019-01-01 RX ORDER — POTASSIUM CHLORIDE 7.45 MG/ML
10 INJECTION INTRAVENOUS
Status: DISCONTINUED | OUTPATIENT
Start: 2019-01-01 | End: 2019-01-01 | Stop reason: HOSPADM

## 2019-01-01 RX ORDER — ACETAMINOPHEN 650 MG/1
650 SUPPOSITORY RECTAL EVERY 4 HOURS PRN
Status: DISCONTINUED | OUTPATIENT
Start: 2019-01-01 | End: 2019-01-01 | Stop reason: HOSPADM

## 2019-01-01 RX ORDER — ALENDRONATE SODIUM 70 MG/1
70 TABLET ORAL WEEKLY
Qty: 4 TABLET | Refills: 3 | Status: SHIPPED | OUTPATIENT
Start: 2019-01-01 | End: 2020-01-01

## 2019-01-01 RX ORDER — METOPROLOL SUCCINATE 25 MG/1
25 TABLET, EXTENDED RELEASE ORAL DAILY
Qty: 30 TABLET | Refills: 0 | Status: SHIPPED | OUTPATIENT
Start: 2019-01-01 | End: 2019-01-01 | Stop reason: HOSPADM

## 2019-01-01 RX ORDER — ACETAMINOPHEN 325 MG/1
650 TABLET ORAL EVERY 6 HOURS PRN
Status: DISCONTINUED | OUTPATIENT
Start: 2019-01-01 | End: 2019-01-01 | Stop reason: HOSPADM

## 2019-01-01 RX ORDER — FENTANYL CITRATE 50 UG/ML
INJECTION, SOLUTION INTRAMUSCULAR; INTRAVENOUS AS NEEDED
Status: DISCONTINUED | OUTPATIENT
Start: 2019-01-01 | End: 2019-01-01 | Stop reason: HOSPADM

## 2019-01-01 RX ORDER — ISOSORBIDE MONONITRATE 60 MG/1
60 TABLET, EXTENDED RELEASE ORAL DAILY
COMMUNITY
End: 2019-01-01

## 2019-01-01 RX ORDER — HYDROCHLOROTHIAZIDE 12.5 MG/1
12.5 TABLET ORAL DAILY
Status: DISCONTINUED | OUTPATIENT
Start: 2019-01-01 | End: 2019-01-01 | Stop reason: HOSPADM

## 2019-01-01 RX ORDER — IPRATROPIUM BROMIDE AND ALBUTEROL SULFATE 2.5; .5 MG/3ML; MG/3ML
SOLUTION RESPIRATORY (INHALATION)
COMMUNITY
Start: 2015-03-12 | End: 2019-01-01

## 2019-01-01 RX ORDER — OMEPRAZOLE 20 MG/1
20 TABLET, DELAYED RELEASE ORAL
Qty: 90 | Refills: 3 | Status: COMPLETED
Start: 2019-01-01 | End: 2020-01-01

## 2019-01-01 RX ORDER — CHOLECALCIFEROL (VITAMIN D3) 125 MCG
5 CAPSULE ORAL NIGHTLY PRN
Status: DISCONTINUED | OUTPATIENT
Start: 2019-01-01 | End: 2019-01-01 | Stop reason: HOSPADM

## 2019-01-01 RX ORDER — AMLODIPINE BESYLATE 5 MG/1
5 TABLET ORAL
Qty: 30 TABLET | Refills: 0 | Status: SHIPPED | OUTPATIENT
Start: 2019-01-01 | End: 2019-01-01 | Stop reason: SDUPTHER

## 2019-01-01 RX ORDER — METOPROLOL SUCCINATE 25 MG/1
25 TABLET, EXTENDED RELEASE ORAL DAILY
Status: DISCONTINUED | OUTPATIENT
Start: 2019-01-01 | End: 2019-01-01 | Stop reason: HOSPADM

## 2019-01-01 RX ORDER — SODIUM CHLORIDE 9 MG/ML
30 INJECTION, SOLUTION INTRAVENOUS CONTINUOUS
Status: DISCONTINUED | OUTPATIENT
Start: 2019-01-01 | End: 2019-01-01 | Stop reason: HOSPADM

## 2019-01-01 RX ORDER — SODIUM CHLORIDE 0.9 % (FLUSH) 0.9 %
3 SYRINGE (ML) INJECTION EVERY 12 HOURS SCHEDULED
Status: DISCONTINUED | OUTPATIENT
Start: 2019-01-01 | End: 2019-01-01 | Stop reason: HOSPADM

## 2019-01-01 RX ORDER — LEVOTHYROXINE SODIUM 0.05 MG/1
25 TABLET ORAL DAILY
Status: DISCONTINUED | OUTPATIENT
Start: 2019-01-01 | End: 2019-01-01 | Stop reason: HOSPADM

## 2019-01-01 RX ORDER — SULFASALAZINE 500 MG/1
1000 TABLET ORAL 2 TIMES DAILY
Qty: 360 TABLET | Refills: 0 | Status: SHIPPED | OUTPATIENT
Start: 2019-01-01 | End: 2020-01-01

## 2019-01-01 RX ORDER — METOPROLOL SUCCINATE 25 MG/1
25 TABLET, EXTENDED RELEASE ORAL DAILY
Qty: 30 TABLET | Refills: 5 | Status: SHIPPED | OUTPATIENT
Start: 2019-01-01 | End: 2020-01-01

## 2019-01-01 RX ADMIN — PANTOPRAZOLE SODIUM 40 MG: 40 TABLET, DELAYED RELEASE ORAL at 05:56

## 2019-01-01 RX ADMIN — SULFASALAZINE 1000 MG: 500 TABLET ORAL at 08:17

## 2019-01-01 RX ADMIN — LEVOTHYROXINE SODIUM 25 MCG: 25 TABLET ORAL at 05:28

## 2019-01-01 RX ADMIN — CLOPIDOGREL BISULFATE 75 MG: 75 TABLET ORAL at 09:50

## 2019-01-01 RX ADMIN — CLOPIDOGREL BISULFATE 75 MG: 75 TABLET ORAL at 09:14

## 2019-01-01 RX ADMIN — AMLODIPINE BESYLATE 5 MG: 5 TABLET ORAL at 09:50

## 2019-01-01 RX ADMIN — BUDESONIDE AND FORMOTEROL FUMARATE DIHYDRATE 2 PUFF: 160; 4.5 AEROSOL RESPIRATORY (INHALATION) at 19:33

## 2019-01-01 RX ADMIN — SULFASALAZINE 1000 MG: 500 TABLET ORAL at 08:59

## 2019-01-01 RX ADMIN — LEVETIRACETAM 500 MG: 500 TABLET ORAL at 21:23

## 2019-01-01 RX ADMIN — AMLODIPINE BESYLATE 5 MG: 5 TABLET ORAL at 08:59

## 2019-01-01 RX ADMIN — BUDESONIDE AND FORMOTEROL FUMARATE DIHYDRATE 2 PUFF: 160; 4.5 AEROSOL RESPIRATORY (INHALATION) at 08:31

## 2019-01-01 RX ADMIN — ATORVASTATIN CALCIUM 20 MG: 20 TABLET, FILM COATED ORAL at 23:04

## 2019-01-01 RX ADMIN — LEVETIRACETAM 500 MG: 500 TABLET, FILM COATED ORAL at 09:15

## 2019-01-01 RX ADMIN — BUDESONIDE AND FORMOTEROL FUMARATE DIHYDRATE 2 PUFF: 160; 4.5 AEROSOL RESPIRATORY (INHALATION) at 09:15

## 2019-01-01 RX ADMIN — LEVETIRACETAM 500 MG: 500 TABLET ORAL at 23:04

## 2019-01-01 RX ADMIN — POTASSIUM CHLORIDE 40 MEQ: 1500 TABLET, EXTENDED RELEASE ORAL at 23:04

## 2019-01-01 RX ADMIN — PREGABALIN 100 MG: 100 CAPSULE ORAL at 16:00

## 2019-01-01 RX ADMIN — ACETAMINOPHEN 650 MG: 325 TABLET ORAL at 20:11

## 2019-01-01 RX ADMIN — AMLODIPINE BESYLATE 5 MG: 5 TABLET ORAL at 08:18

## 2019-01-01 RX ADMIN — SODIUM CHLORIDE 30 ML/HR: 900 INJECTION, SOLUTION INTRAVENOUS at 10:16

## 2019-01-01 RX ADMIN — PREGABALIN 100 MG: 100 CAPSULE ORAL at 09:50

## 2019-01-01 RX ADMIN — Medication 3 ML: at 20:13

## 2019-01-01 RX ADMIN — ASPIRIN 81 MG 81 MG: 81 TABLET ORAL at 09:18

## 2019-01-01 RX ADMIN — SULFASALAZINE 1000 MG: 500 TABLET ORAL at 23:04

## 2019-01-01 RX ADMIN — METOPROLOL TARTRATE 2.5 MG: 5 INJECTION INTRAVENOUS at 22:28

## 2019-01-01 RX ADMIN — ASPIRIN 81 MG 81 MG: 81 TABLET ORAL at 09:50

## 2019-01-01 RX ADMIN — PREGABALIN 100 MG: 100 CAPSULE ORAL at 21:37

## 2019-01-01 RX ADMIN — METOPROLOL TARTRATE 2.5 MG: 5 INJECTION INTRAVENOUS at 05:25

## 2019-01-01 RX ADMIN — Medication 10 ML: at 23:06

## 2019-01-01 RX ADMIN — ASPIRIN 81 MG 81 MG: 81 TABLET ORAL at 08:59

## 2019-01-01 RX ADMIN — CYANOCOBALAMIN TAB 250 MCG 250 MCG: 250 TAB at 08:59

## 2019-01-01 RX ADMIN — Medication 10 ML: at 09:18

## 2019-01-01 RX ADMIN — AMLODIPINE BESYLATE 5 MG: 5 TABLET ORAL at 11:59

## 2019-01-01 RX ADMIN — Medication 10 ML: at 09:51

## 2019-01-01 RX ADMIN — PREGABALIN 100 MG: 100 CAPSULE ORAL at 21:23

## 2019-01-01 RX ADMIN — LEVOTHYROXINE SODIUM 25 MCG: 25 TABLET ORAL at 05:51

## 2019-01-01 RX ADMIN — PREGABALIN 100 MG: 100 CAPSULE ORAL at 14:52

## 2019-01-01 RX ADMIN — Medication 10 ML: at 08:59

## 2019-01-01 RX ADMIN — LEVOTHYROXINE SODIUM 25 MCG: 50 TABLET ORAL at 08:18

## 2019-01-01 RX ADMIN — CLOPIDOGREL BISULFATE 75 MG: 75 TABLET ORAL at 08:59

## 2019-01-01 RX ADMIN — ATORVASTATIN CALCIUM 20 MG: 20 TABLET, FILM COATED ORAL at 21:37

## 2019-01-01 RX ADMIN — Medication 3 ML: at 08:24

## 2019-01-01 RX ADMIN — PREGABALIN 100 MG: 100 CAPSULE ORAL at 09:15

## 2019-01-01 RX ADMIN — PREGABALIN 100 MG: 100 CAPSULE ORAL at 23:04

## 2019-01-01 RX ADMIN — Medication 10 ML: at 21:37

## 2019-01-01 RX ADMIN — ASPIRIN 325 MG ORAL TABLET 325 MG: 325 PILL ORAL at 20:37

## 2019-01-01 RX ADMIN — ATORVASTATIN CALCIUM 20 MG: 20 TABLET, FILM COATED ORAL at 20:12

## 2019-01-01 RX ADMIN — METOPROLOL TARTRATE 2.5 MG: 5 INJECTION INTRAVENOUS at 18:38

## 2019-01-01 RX ADMIN — MAGNESIUM SULFATE HEPTAHYDRATE 2 G: 40 INJECTION, SOLUTION INTRAVENOUS at 20:37

## 2019-01-01 RX ADMIN — LEVETIRACETAM 500 MG: 500 TABLET, FILM COATED ORAL at 21:37

## 2019-01-01 RX ADMIN — ACETAMINOPHEN 650 MG: 325 TABLET ORAL at 14:52

## 2019-01-01 RX ADMIN — CYANOCOBALAMIN TAB 250 MCG 250 MCG: 250 TAB at 20:12

## 2019-01-01 RX ADMIN — PANTOPRAZOLE SODIUM 40 MG: 40 TABLET, DELAYED RELEASE ORAL at 05:25

## 2019-01-01 RX ADMIN — ASPIRIN 81 MG 81 MG: 81 TABLET ORAL at 09:00

## 2019-01-01 RX ADMIN — PREGABALIN 100 MG: 100 CAPSULE ORAL at 09:00

## 2019-01-01 RX ADMIN — LEVETIRACETAM 500 MG: 500 TABLET ORAL at 09:00

## 2019-01-01 RX ADMIN — PANTOPRAZOLE SODIUM 40 MG: 40 TABLET, DELAYED RELEASE ORAL at 05:51

## 2019-01-01 RX ADMIN — ATORVASTATIN CALCIUM 20 MG: 20 TABLET, FILM COATED ORAL at 21:23

## 2019-01-01 RX ADMIN — LEVOTHYROXINE SODIUM 25 MCG: 25 TABLET ORAL at 05:56

## 2019-01-01 RX ADMIN — ATORVASTATIN CALCIUM 20 MG: 20 TABLET, FILM COATED ORAL at 22:27

## 2019-01-01 RX ADMIN — Medication 10 ML: at 22:46

## 2019-01-01 RX ADMIN — PANTOPRAZOLE SODIUM 40 MG: 40 TABLET, DELAYED RELEASE ORAL at 06:23

## 2019-01-01 RX ADMIN — CLOPIDOGREL BISULFATE 75 MG: 75 TABLET ORAL at 08:18

## 2019-01-01 RX ADMIN — ASPIRIN 81 MG 81 MG: 81 TABLET ORAL at 20:13

## 2019-01-01 RX ADMIN — LEVETIRACETAM 500 MG: 500 TABLET, FILM COATED ORAL at 22:27

## 2019-01-01 RX ADMIN — LIDOCAINE HYDROCHLORIDE,EPINEPHRINE BITARTRATE 20 ML: 20; .01 INJECTION, SOLUTION INFILTRATION; PERINEURAL at 13:19

## 2019-01-01 RX ADMIN — SULFASALAZINE 1000 MG: 500 TABLET ORAL at 22:27

## 2019-01-01 RX ADMIN — SULFASALAZINE 1000 MG: 500 TABLET ORAL at 21:23

## 2019-01-01 RX ADMIN — HYDROCHLOROTHIAZIDE 12.5 MG: 12.5 TABLET ORAL at 09:15

## 2019-01-01 RX ADMIN — SULFASALAZINE 1000 MG: 500 TABLET ORAL at 09:50

## 2019-01-01 RX ADMIN — PANTOPRAZOLE SODIUM 40 MG: 40 TABLET, DELAYED RELEASE ORAL at 05:28

## 2019-01-01 RX ADMIN — LEVETIRACETAM 500 MG: 500 TABLET ORAL at 08:59

## 2019-01-01 RX ADMIN — METOPROLOL SUCCINATE 25 MG: 25 TABLET, EXTENDED RELEASE ORAL at 09:50

## 2019-01-01 RX ADMIN — METOPROLOL SUCCINATE 25 MG: 25 TABLET, EXTENDED RELEASE ORAL at 15:19

## 2019-01-01 RX ADMIN — ENOXAPARIN SODIUM 40 MG: 40 INJECTION SUBCUTANEOUS at 15:59

## 2019-01-01 RX ADMIN — LEVETIRACETAM 500 MG: 500 TABLET, FILM COATED ORAL at 09:50

## 2019-01-01 RX ADMIN — ASPIRIN 81 MG 81 MG: 81 TABLET ORAL at 08:18

## 2019-01-01 RX ADMIN — SULFASALAZINE 1000 MG: 500 TABLET ORAL at 21:37

## 2019-01-01 RX ADMIN — PREGABALIN 100 MG: 100 CAPSULE ORAL at 16:09

## 2019-01-01 RX ADMIN — SULFASALAZINE 1000 MG: 500 TABLET ORAL at 09:15

## 2019-01-01 RX ADMIN — ACETAMINOPHEN 650 MG: 325 TABLET ORAL at 02:32

## 2019-01-01 RX ADMIN — LEVOTHYROXINE SODIUM 25 MCG: 25 TABLET ORAL at 05:25

## 2019-01-01 RX ADMIN — HYDROCHLOROTHIAZIDE 12.5 MG: 12.5 TABLET ORAL at 09:50

## 2019-01-01 RX ADMIN — PREGABALIN 100 MG: 100 CAPSULE ORAL at 08:18

## 2019-01-01 RX ADMIN — POTASSIUM CHLORIDE 40 MEQ: 1500 TABLET, EXTENDED RELEASE ORAL at 19:18

## 2019-01-01 RX ADMIN — PREGABALIN 100 MG: 100 CAPSULE ORAL at 15:51

## 2019-01-01 RX ADMIN — ACETAMINOPHEN 650 MG: 325 TABLET ORAL at 08:18

## 2019-01-01 RX ADMIN — PREGABALIN 100 MG: 100 CAPSULE ORAL at 22:27

## 2019-01-01 RX ADMIN — CYANOCOBALAMIN TAB 250 MCG 250 MCG: 250 TAB at 08:18

## 2019-01-01 RX ADMIN — VANCOMYCIN HYDROCHLORIDE 1 G: 10 INJECTION, POWDER, LYOPHILIZED, FOR SOLUTION INTRAVENOUS at 13:08

## 2019-01-01 RX ADMIN — Medication 10 ML: at 21:25

## 2019-01-01 RX ADMIN — Medication 10 ML: at 09:00

## 2019-01-01 RX ADMIN — PREGABALIN 100 MG: 100 CAPSULE ORAL at 08:59

## 2019-05-06 ENCOUNTER — HOSPITAL ENCOUNTER (OUTPATIENT)
Dept: LAB | Facility: HOSPITAL | Age: 74
Discharge: HOME | End: 2019-05-06
Attending: INTERNAL MEDICINE | Admitting: INTERNAL MEDICINE

## 2019-06-17 ENCOUNTER — HOSPITAL ENCOUNTER (EMERGENCY)
Dept: CT IMAGING | Facility: HOSPITAL | Age: 74
Discharge: HOME | End: 2019-06-17

## 2019-06-17 ENCOUNTER — HOSPITAL ENCOUNTER (EMERGENCY)
Dept: GENERAL RADIOLOGY | Facility: HOSPITAL | Age: 74
Discharge: HOME | End: 2019-06-17

## 2019-06-17 ENCOUNTER — HOSPITAL ENCOUNTER (INPATIENT)
Facility: HOSPITAL | Age: 74
LOS: 4 days | Discharge: HOME | End: 2019-06-21
Attending: INTERNAL MEDICINE | Admitting: INTERNAL MEDICINE

## 2019-06-17 DIAGNOSIS — R07.9 CHEST PAIN IN ADULT: Primary | ICD-10-CM

## 2019-06-17 DIAGNOSIS — R00.1 BRADYCARDIA WITH 51-60 BEATS PER MINUTE: ICD-10-CM

## 2019-06-17 DIAGNOSIS — I49.8 VENTRICULAR BIGEMINY: ICD-10-CM

## 2019-06-17 LAB
ALBUMIN SERPL-MCNC: 3.9 G/DL (ref 3.5–4.8)
ALBUMIN/GLOB SERPL: 1.5 G/DL (ref 1–1.7)
ALP SERPL-CCNC: 91 U/L (ref 32–91)
ALT SERPL W P-5'-P-CCNC: 13 U/L (ref 14–54)
ANION GAP SERPL CALCULATED.3IONS-SCNC: 9 MMOL/L (ref 10–20)
AST SERPL-CCNC: 28 U/L (ref 15–41)
BASOPHILS # BLD AUTO: 0 10*3/MM3 (ref 0–0.2)
BASOPHILS NFR BLD AUTO: 0.5 % (ref 0–1.5)
BILIRUB SERPL-MCNC: 1.2 MG/DL (ref 0.3–1.2)
BUN SERPL-MCNC: 15 MG/DL (ref 8–20)
BUN/CREAT SERPL: 13.6 (ref 5.4–26.2)
CALCIUM SPEC-SCNC: 9.2 MG/DL (ref 8.9–10.3)
CHLORIDE SERPL-SCNC: 105 MMOL/L (ref 101–111)
CO2 SERPL-SCNC: 23 MMOL/L (ref 22–32)
CREAT SERPL-MCNC: 1.1 MG/DL (ref 0.4–1)
DEPRECATED RDW RBC AUTO: 41.6 FL (ref 37–54)
EOSINOPHIL # BLD AUTO: 0.1 10*3/MM3 (ref 0–0.4)
EOSINOPHIL NFR BLD AUTO: 1.1 % (ref 0.3–6.2)
ERYTHROCYTE [DISTWIDTH] IN BLOOD BY AUTOMATED COUNT: 12.6 % (ref 12.3–15.4)
GFR SERPL CREATININE-BSD FRML MDRD: 49 ML/MIN/1.73
GLOBULIN UR ELPH-MCNC: 2.6 GM/DL (ref 2.5–3.8)
GLUCOSE SERPL-MCNC: 82 MG/DL (ref 65–99)
HCT VFR BLD AUTO: 42 % (ref 34–46.6)
HGB BLD-MCNC: 14.2 G/DL (ref 12–15.9)
INR PPP: 1.08 (ref 0.9–1.1)
LYMPHOCYTES # BLD AUTO: 1.9 10*3/MM3 (ref 0.7–3.1)
LYMPHOCYTES NFR BLD AUTO: 30.4 % (ref 19.6–45.3)
MCH RBC QN AUTO: 32 PG (ref 26.6–33)
MCHC RBC AUTO-ENTMCNC: 33.9 G/DL (ref 31.5–35.7)
MCV RBC AUTO: 94.5 FL (ref 79–97)
MONOCYTES # BLD AUTO: 0.8 10*3/MM3 (ref 0.1–0.9)
MONOCYTES NFR BLD AUTO: 12.2 % (ref 5–12)
NEUTROPHILS NFR BLD AUTO: 3.5 10*3/MM3 (ref 1.7–7)
NEUTROPHILS NFR BLD AUTO: 55.8 % (ref 42.7–76)
NRBC BLD AUTO-RTO: 0.1 /100 WBC (ref 0–0.2)
PLATELET # BLD AUTO: 163 10*3/MM3 (ref 140–450)
PMV BLD AUTO: 10.5 FL (ref 6–12)
POTASSIUM SERPL-SCNC: 4.4 MMOL/L (ref 3.6–5.1)
PROT SERPL-MCNC: 6.5 G/DL (ref 6.1–7.9)
PROTHROMBIN TIME: 11 SECONDS (ref 9.6–11.7)
RBC # BLD AUTO: 4.45 10*6/MM3 (ref 3.77–5.28)
SODIUM SERPL-SCNC: 137 MMOL/L (ref 136–144)
TROPONIN I SERPL-MCNC: <0.03 NG/ML (ref 0–0.03)
TROPONIN I SERPL-MCNC: <0.03 NG/ML (ref 0–0.03)
WBC NRBC COR # BLD: 6.3 10*3/MM3 (ref 3.4–10.8)

## 2019-06-17 PROCEDURE — 93005 ELECTROCARDIOGRAM TRACING: CPT | Performed by: EMERGENCY MEDICINE

## 2019-06-17 PROCEDURE — 85610 PROTHROMBIN TIME: CPT | Performed by: EMERGENCY MEDICINE

## 2019-06-17 PROCEDURE — 84484 ASSAY OF TROPONIN QUANT: CPT | Performed by: EMERGENCY MEDICINE

## 2019-06-17 PROCEDURE — 70450 CT HEAD/BRAIN W/O DYE: CPT

## 2019-06-17 PROCEDURE — 99220 PR INITIAL OBSERVATION CARE/DAY 70 MINUTES: CPT | Performed by: NURSE PRACTITIONER

## 2019-06-17 PROCEDURE — G0378 HOSPITAL OBSERVATION PER HR: HCPCS

## 2019-06-17 PROCEDURE — 99285 EMERGENCY DEPT VISIT HI MDM: CPT

## 2019-06-17 PROCEDURE — 71045 X-RAY EXAM CHEST 1 VIEW: CPT

## 2019-06-17 PROCEDURE — 80053 COMPREHEN METABOLIC PANEL: CPT | Performed by: EMERGENCY MEDICINE

## 2019-06-17 PROCEDURE — 84484 ASSAY OF TROPONIN QUANT: CPT | Performed by: NURSE PRACTITIONER

## 2019-06-17 PROCEDURE — 85025 COMPLETE CBC W/AUTO DIFF WBC: CPT | Performed by: EMERGENCY MEDICINE

## 2019-06-17 RX ORDER — LEVOTHYROXINE SODIUM 0.03 MG/1
25 TABLET ORAL
Status: DISCONTINUED | OUTPATIENT
Start: 2019-06-18 | End: 2019-06-21 | Stop reason: HOSPADM

## 2019-06-17 RX ORDER — ATORVASTATIN CALCIUM 20 MG/1
20 TABLET, FILM COATED ORAL NIGHTLY
Status: DISCONTINUED | OUTPATIENT
Start: 2019-06-17 | End: 2019-06-21 | Stop reason: HOSPADM

## 2019-06-17 RX ORDER — LISINOPRIL 20 MG/1
20 TABLET ORAL DAILY
COMMUNITY
End: 2019-06-21 | Stop reason: HOSPADM

## 2019-06-17 RX ORDER — SODIUM CHLORIDE 0.9 % (FLUSH) 0.9 %
3-10 SYRINGE (ML) INJECTION AS NEEDED
Status: DISCONTINUED | OUTPATIENT
Start: 2019-06-17 | End: 2019-06-21 | Stop reason: HOSPADM

## 2019-06-17 RX ORDER — PREGABALIN 100 MG/1
100 CAPSULE ORAL 3 TIMES DAILY
COMMUNITY

## 2019-06-17 RX ORDER — ALBUTEROL SULFATE 90 UG/1
AEROSOL, METERED RESPIRATORY (INHALATION)
COMMUNITY
Start: 2017-12-01 | End: 2019-06-17

## 2019-06-17 RX ORDER — CLOPIDOGREL BISULFATE 75 MG/1
75 TABLET ORAL DAILY
Status: DISCONTINUED | OUTPATIENT
Start: 2019-06-18 | End: 2019-06-21 | Stop reason: HOSPADM

## 2019-06-17 RX ORDER — PANTOPRAZOLE SODIUM 20 MG/1
20 TABLET, DELAYED RELEASE ORAL DAILY
COMMUNITY

## 2019-06-17 RX ORDER — LEVETIRACETAM 500 MG/1
500 TABLET ORAL EVERY 12 HOURS SCHEDULED
Status: DISCONTINUED | OUTPATIENT
Start: 2019-06-17 | End: 2019-06-21 | Stop reason: HOSPADM

## 2019-06-17 RX ORDER — ONDANSETRON 4 MG/1
4 TABLET, FILM COATED ORAL EVERY 6 HOURS PRN
Status: DISCONTINUED | OUTPATIENT
Start: 2019-06-17 | End: 2019-06-20 | Stop reason: SDUPTHER

## 2019-06-17 RX ORDER — LEVETIRACETAM 500 MG/1
500 TABLET ORAL 2 TIMES DAILY
COMMUNITY

## 2019-06-17 RX ORDER — PANTOPRAZOLE SODIUM 20 MG/1
20 TABLET, DELAYED RELEASE ORAL
Status: DISCONTINUED | OUTPATIENT
Start: 2019-06-18 | End: 2019-06-17

## 2019-06-17 RX ORDER — EZETIMIBE 10 MG/1
10 TABLET ORAL DAILY
COMMUNITY

## 2019-06-17 RX ORDER — SULFASALAZINE 500 MG/1
1000 TABLET ORAL 2 TIMES DAILY
COMMUNITY
End: 2019-01-01 | Stop reason: SDUPTHER

## 2019-06-17 RX ORDER — MELATONIN
500 DAILY
Status: DISCONTINUED | OUTPATIENT
Start: 2019-06-18 | End: 2019-06-21 | Stop reason: HOSPADM

## 2019-06-17 RX ORDER — ONDANSETRON 2 MG/ML
4 INJECTION INTRAMUSCULAR; INTRAVENOUS EVERY 6 HOURS PRN
Status: DISCONTINUED | OUTPATIENT
Start: 2019-06-17 | End: 2019-06-20 | Stop reason: SDUPTHER

## 2019-06-17 RX ORDER — TIZANIDINE 4 MG/1
2 TABLET ORAL NIGHTLY PRN
Status: DISCONTINUED | OUTPATIENT
Start: 2019-06-17 | End: 2019-06-21 | Stop reason: HOSPADM

## 2019-06-17 RX ORDER — HYDROCHLOROTHIAZIDE 12.5 MG/1
12.5 TABLET ORAL DAILY
Status: DISCONTINUED | OUTPATIENT
Start: 2019-06-18 | End: 2019-06-21 | Stop reason: HOSPADM

## 2019-06-17 RX ORDER — SODIUM CHLORIDE 0.9 % (FLUSH) 0.9 %
3 SYRINGE (ML) INJECTION EVERY 12 HOURS SCHEDULED
Status: DISCONTINUED | OUTPATIENT
Start: 2019-06-17 | End: 2019-06-21 | Stop reason: HOSPADM

## 2019-06-17 RX ORDER — NITROGLYCERIN 0.4 MG/1
0.4 TABLET SUBLINGUAL
Status: DISCONTINUED | OUTPATIENT
Start: 2019-06-17 | End: 2019-06-21 | Stop reason: HOSPADM

## 2019-06-17 RX ORDER — NITROGLYCERIN 10 MG/100ML
10-50 INJECTION INTRAVENOUS
Status: DISCONTINUED | OUTPATIENT
Start: 2019-06-17 | End: 2019-06-21 | Stop reason: HOSPADM

## 2019-06-17 RX ORDER — AMLODIPINE BESYLATE 5 MG/1
5 TABLET ORAL DAILY
COMMUNITY
Start: 2017-10-17 | End: 2019-06-21 | Stop reason: HOSPADM

## 2019-06-17 RX ORDER — METOPROLOL SUCCINATE 100 MG/1
100 TABLET, EXTENDED RELEASE ORAL DAILY
COMMUNITY
Start: 2018-09-05 | End: 2019-06-21 | Stop reason: HOSPADM

## 2019-06-17 RX ORDER — PREGABALIN 100 MG/1
100 CAPSULE ORAL 3 TIMES DAILY
Status: DISCONTINUED | OUTPATIENT
Start: 2019-06-17 | End: 2019-06-21 | Stop reason: HOSPADM

## 2019-06-17 RX ORDER — LEVOTHYROXINE SODIUM 0.03 MG/1
25 TABLET ORAL DAILY
COMMUNITY

## 2019-06-17 RX ORDER — ATORVASTATIN CALCIUM 10 MG/1
20 TABLET, FILM COATED ORAL NIGHTLY
COMMUNITY

## 2019-06-17 RX ORDER — SODIUM CHLORIDE 0.9 % (FLUSH) 0.9 %
10 SYRINGE (ML) INJECTION AS NEEDED
Status: DISCONTINUED | OUTPATIENT
Start: 2019-06-17 | End: 2019-06-21 | Stop reason: HOSPADM

## 2019-06-17 RX ORDER — TIZANIDINE 4 MG/1
2 TABLET ORAL NIGHTLY PRN
COMMUNITY
End: 2019-01-01

## 2019-06-17 RX ORDER — CHOLECALCIFEROL (VITAMIN D3) 125 MCG
5 CAPSULE ORAL NIGHTLY PRN
Status: DISCONTINUED | OUTPATIENT
Start: 2019-06-17 | End: 2019-06-21 | Stop reason: HOSPADM

## 2019-06-17 RX ORDER — CLOPIDOGREL BISULFATE 75 MG/1
75 TABLET ORAL DAILY
COMMUNITY

## 2019-06-17 RX ORDER — ACETAMINOPHEN 325 MG/1
650 TABLET ORAL EVERY 4 HOURS PRN
Status: DISCONTINUED | OUTPATIENT
Start: 2019-06-17 | End: 2019-06-21 | Stop reason: HOSPADM

## 2019-06-17 RX ORDER — HYDROCHLOROTHIAZIDE 12.5 MG/1
12.5 CAPSULE, GELATIN COATED ORAL DAILY
COMMUNITY
End: 2019-01-01 | Stop reason: HOSPADM

## 2019-06-17 RX ORDER — PANTOPRAZOLE SODIUM 40 MG/1
40 TABLET, DELAYED RELEASE ORAL
Status: DISCONTINUED | OUTPATIENT
Start: 2019-06-18 | End: 2019-06-21 | Stop reason: HOSPADM

## 2019-06-17 RX ORDER — SULFASALAZINE 500 MG/1
1000 TABLET ORAL 2 TIMES DAILY
Status: DISCONTINUED | OUTPATIENT
Start: 2019-06-17 | End: 2019-06-21 | Stop reason: HOSPADM

## 2019-06-17 RX ADMIN — LEVETIRACETAM 500 MG: 500 TABLET, FILM COATED ORAL at 22:39

## 2019-06-17 RX ADMIN — NITROGLYCERIN 10 MCG/MIN: 10 INJECTION INTRAVENOUS at 14:07

## 2019-06-17 RX ADMIN — Medication 3 ML: at 22:40

## 2019-06-17 RX ADMIN — ATORVASTATIN CALCIUM 20 MG: 20 TABLET, FILM COATED ORAL at 22:39

## 2019-06-17 RX ADMIN — NITROGLYCERIN 10 MCG/MIN: 10 INJECTION INTRAVENOUS at 14:15

## 2019-06-17 RX ADMIN — PREGABALIN 100 MG: 100 CAPSULE ORAL at 22:38

## 2019-06-17 RX ADMIN — SULFASALAZINE 1000 MG: 500 TABLET ORAL at 22:40

## 2019-06-17 RX ADMIN — SODIUM CHLORIDE 250 ML: 0.9 INJECTION, SOLUTION INTRAVENOUS at 14:08

## 2019-06-17 NOTE — H&P
[facility] HOSPITALIST     Keith Alston MD    CHIEF COMPLAINT:     Palpitations, dizziness    HISTORY OF PRESENT ILLNESS:    Ms Rob is a 73 year old  female with PMH of CAD s/p CABG 4v 2010, PVD s/p right fem-pop bypass, hypothyroidism, seizures, melanoma w/ left arm lymphnodes removed, hiatal hernia, cholecystectomy, and hysterectomy who presented to the ED 6/17/2019 with complaints of waking up feeling dizzy and lightheaded around 4am. She was nauseous, had a headache, and felt very fatigued. She had some left lower chest pain described as a dull ache and palpitations. She also had associated shortness of breath. Her visiting nurse and daughter took her pulse and it was in the 30s and her bp was 98/59 per daughter report. She denied any recent illness. She has seen Dr. Renee in the past.     In the ED the patient was found to be in bigeminy. Her lab work was unremarkable. She is on metoprolol, which will be held. She was started on IV nitro drip and admitted for further treatment.       Past Medical History:   Diagnosis Date   • Arthritis    • Atrial fibrillation (CMS/HCC)    • Bronchitis    • Chickenpox    • COPD (chronic obstructive pulmonary disease) (CMS/HCC)    • Cramps of lower extremity     bilateral with weakness   • Heart disease    • Hepatitis C 2013    treatment x 11 months   • High cholesterol    • History of degenerative disc disease    • HLD (hyperlipidemia)    • HTN (hypertension)    • Hypothyroidism    • Measles    • Seizure disorder (CMS/HCC)    • Skin melanoma (CMS/HCC) 2013    s/p lymph node removal on the right   • Sleep apnea    • Stroke (CMS/HCC)    • Whooping cough      Past Surgical History:   Procedure Laterality Date   • APPENDECTOMY     • AXILLARY NODE DISSECTION Right 05/2014   • CATARACT EXTRACTION  01/2017    x 2   • CHOLECYSTECTOMY  2004   • COLONOSCOPY  04/05/2017   • CORONARY ANGIOPLASTY WITH STENT PLACEMENT      stent placement after CABG   •  CORONARY ARTERY BYPASS GRAFT  01/20/2010   • CORONARY ARTERY BYPASS GRAFT     • FEMORAL POPLITEAL BYPASS Right 07/20/2017    Dr. Palmer   • INCISIONAL HERNIA REPAIR  09/28/2016    lap. Dr. West   • MOLE REMOVAL Right     arm   • OTHER SURGICAL HISTORY      laser surgery X 2   • PARTIAL HYSTERECTOMY     • THYROIDECTOMY, PARTIAL      goiter removed 1/2 thyroid     Family History   Problem Relation Age of Onset   • Hypertension Mother    • Cancer Mother    • Gout Mother    • Asthma Mother    • Cancer Father    • Heart disease Brother    • Diabetes Other    • Cancer Other    • Tuberculosis Other      Social History     Tobacco Use   • Smoking status: Former Smoker     Last attempt to quit: 6/17/2009     Years since quitting: 10.0   Substance Use Topics   • Alcohol use: No     Frequency: Never   • Drug use: No     Medications Prior to Admission   Medication Sig Dispense Refill Last Dose   • amLODIPine (NORVASC) 5 MG tablet Take 5 mg by mouth Daily.   6/17/2019 at Unknown time   • atorvastatin (LIPITOR) 10 MG tablet Take 20 mg by mouth Every Night.   6/16/2019 at Unknown time   • Cholecalciferol 80210 units tablet Take 200 Units by mouth 2 (Two) Times a Day.   6/17/2019 at Unknown time   • clopidogrel (PLAVIX) 75 MG tablet Take 75 mg by mouth Daily.   6/17/2019 at Unknown time   • ezetimibe (ZETIA) 10 MG tablet Take 10 mg by mouth Daily.   6/17/2019 at Unknown time   • hydrochlorothiazide (HYDRODIURIL) 25 MG tablet Take 12.5 mg by mouth Daily.   6/17/2019 at Unknown time   • levETIRAcetam (KEPPRA) 500 MG tablet Take 500 mg by mouth 2 (Two) Times a Day.   6/17/2019 at Unknown time   • levothyroxine (SYNTHROID, LEVOTHROID) 25 MCG tablet Take 25 mcg by mouth Daily.   6/17/2019 at Unknown time   • lisinopril (PRINIVIL,ZESTRIL) 20 MG tablet Take 20 mg by mouth Daily.   6/17/2019 at Unknown time   • metoprolol succinate XL (TOPROL-XL) 100 MG 24 hr tablet Take 100 mg by mouth Daily.   6/17/2019 at Unknown time   •  "pantoprazole (PROTONIX) 20 MG EC tablet Take 20 mg by mouth Daily.   6/17/2019 at Unknown time   • pregabalin (LYRICA) 100 MG capsule Take 100 mg by mouth 3 (Three) Times a Day.   6/17/2019 at Unknown time   • sulfaSALAzine (AZULFIDINE) 500 MG tablet Take 1,000 mg by mouth 2 (Two) Times a Day.   6/17/2019 at Unknown time   • tiZANidine (ZANAFLEX) 4 MG tablet Take 2 mg by mouth At Night As Needed for Muscle Spasms.   6/16/2019 at Unknown time     Allergies:  Penicillins      There is no immunization history on file for this patient.        REVIEW OF SYSTEMS:     Review of Systems   Constitution: Negative.   HENT: Negative.    Eyes: Negative.    Cardiovascular: Positive for chest pain and palpitations.   Respiratory: Positive for shortness of breath.    Endocrine: Negative.    Hematologic/Lymphatic: Negative.    Skin: Negative.    Musculoskeletal: Negative.    Gastrointestinal: Positive for nausea.   Genitourinary: Negative.    Neurological: Positive for dizziness.   Psychiatric/Behavioral: Negative.    Allergic/Immunologic: Negative.    All other systems reviewed and are negative.      Vital Signs  Temp:  [97.6 °F (36.4 °C)-97.8 °F (36.6 °C)] 97.6 °F (36.4 °C)  Heart Rate:  [40-59] 59  Resp:  [18-21] 21  BP: ()/(66-76) 108/76    Flowsheet Rows      First Filed Value   Admission Height  157.5 cm (62\") Documented at 06/17/2019 1250   Admission Weight  78.5 kg (173 lb 1 oz) Documented at 06/17/2019 1250           Physical Exam:    Physical Exam   Constitutional: She is oriented to person, place, and time. She appears well-developed and well-nourished.   HENT:   Head: Normocephalic and atraumatic.   Right Ear: External ear normal.   Left Ear: External ear normal.   Nose: Nose normal.   Eyes: Conjunctivae and EOM are normal.   Neck: Normal range of motion.   Cardiovascular: Normal rate, S1 normal, S2 normal and intact distal pulses. An irregular rhythm present.   Pulmonary/Chest: Breath sounds normal.   Abdominal: " Soft. Bowel sounds are normal.   Musculoskeletal: Normal range of motion.   Neurological: She is alert and oriented to person, place, and time.   Skin: Skin is warm and dry.   Psychiatric: She has a normal mood and affect. Her behavior is normal.   Nursing note and vitals reviewed.        Results Review:    I reviewed the patient's new clinical results.  Lab Results (most recent)     Procedure Component Value Units Date/Time    Troponin [[id number]] Collected:  06/17/19 1911    Specimen:  Blood Updated:  06/17/19 1927    Comprehensive Metabolic Panel [[id number]]  (Abnormal) Collected:  06/17/19 1334    Specimen:  Blood Updated:  06/17/19 1551     Glucose 82 mg/dL      BUN 15 mg/dL      Creatinine 1.10 mg/dL      Sodium 137 mmol/L      Potassium 4.4 mmol/L      Comment: Specimen hemolyzed.  Results may be affected.        Chloride 105 mmol/L      CO2 23.0 mmol/L      Calcium 9.2 mg/dL      Total Protein 6.5 g/dL      Albumin 3.90 g/dL      ALT (SGPT) 13 U/L      Comment: Specimen hemolyzed.  Results may be affected.        AST (SGOT) 28 U/L      Comment: Specimen hemolyzed.  Results may be affected.        Alkaline Phosphatase 91 U/L      Total Bilirubin 1.2 mg/dL      Comment: Specimen hemolyzed.  Results may be affected.        eGFR Non African Amer 49 mL/min/1.73      Globulin 2.6 gm/dL      A/G Ratio 1.5 g/dL      BUN/Creatinine Ratio 13.6     Anion Gap 9.0 mmol/L     Narrative:       The MDRD GFR formula is only valid for adults with stable renal function between ages 18 and 70.    Troponin [[id number]]  (Normal) Collected:  06/17/19 1334    Specimen:  Blood Updated:  06/17/19 1425     Troponin I <0.030 ng/mL     Narrative:       Troponin I Reference Range:    0.00-0.03  Negative.  Repeat testing in 4-6 hours if clinically indicated.    0.04-0.29  Suspicious for myocardial injury. Serial measurements and clinical  correlation may be necessary to confirm or exclude diagnosis of acute  coronary syndrome.  Repeat  testing in 4-6 hours if indicated.     >0.29 Consistent with myocardial injury.  Recommend clinical and laboratory correlation.     Results my be falsely decreased if patient taking Biotin.     CBC & Differential [[id number]] Collected:  06/17/19 1334    Specimen:  Blood Updated:  06/17/19 1409    Narrative:       The following orders were created for panel order CBC & Differential.  Procedure                               Abnormality         Status                     ---------                               -----------         ------                     CBC Auto Differential[[id number]]        Abnormal            Final result                 Please view results for these tests on the individual orders.    CBC Auto Differential [[id number]]  (Abnormal) Collected:  06/17/19 1334    Specimen:  Blood Updated:  06/17/19 1409     WBC 6.30 10*3/mm3      RBC 4.45 10*6/mm3      Hemoglobin 14.2 g/dL      Hematocrit 42.0 %      MCV 94.5 fL      MCH 32.0 pg      MCHC 33.9 g/dL      RDW 12.6 %      RDW-SD 41.6 fl      MPV 10.5 fL      Platelets 163 10*3/mm3      Neutrophil % 55.8 %      Lymphocyte % 30.4 %      Monocyte % 12.2 %      Eosinophil % 1.1 %      Basophil % 0.5 %      Neutrophils, Absolute 3.50 10*3/mm3      Lymphocytes, Absolute 1.90 10*3/mm3      Monocytes, Absolute 0.80 10*3/mm3      Eosinophils, Absolute 0.10 10*3/mm3      Basophils, Absolute 0.00 10*3/mm3      nRBC 0.1 /100 WBC     Protime-INR [[id number]]  (Normal) Collected:  06/17/19 1334    Specimen:  Blood Updated:  06/17/19 1407     Protime 11.0 Seconds      INR 1.08          Imaging Results (most recent)     Procedure Component Value Units Date/Time    CT Head Without Contrast [[id number]] Collected:  06/17/19 1515     Updated:  06/17/19 1519    Narrative:          DATE OF EXAM:  6/17/2019 2:59 PM     PROCEDURE:   CT HEAD WO CONTRAST-     INDICATIONS:   Dizziness and near syncope     COMPARISON:  No Comparisons Available     TECHNIQUE:   Routine transaxial  cuts were obtained through the head without the  administration of contrast. Automated exposure control and iterative  reconstruction methods were used.      FINDINGS:  There are periventricular and deep white matter changes which could  reflect more chronic small vessel ischemic change. There is old  lacunar-type infarction in the left basal ganglia. There is no  underlying hemorrhage.     Evaluation of bone windows reveals the paranasal sinuses to be clear.  There is no acute abnormality of the calvarium.        Impression:       1.There are white matter changes which could reflect mild chronic small  vessel ischemic change.  2.Old lacunar type infarction left basal ganglia.     Electronically Signed ByTess Dunne On:6/17/2019 3:19 PM  This report was finalized on [id number] by  Thai Dunne, .    XR Chest 1 View [[id number]] Collected:  06/17/19 1408     Updated:  06/17/19 1413    Narrative:       DATE OF EXAM:  6/17/2019 2:02 PM     PROCEDURE:  XR CHEST 1 VW-     INDICATIONS:  chest pain     COMPARISON:  No Comparisons Available     TECHNIQUE:   Single radiographic view of the chest was obtained.     FINDINGS:  The heart is enlarged. There are postoperative changes from midline  sternotomy. Coronary artery bypass graft markers are noted. The  pulmonary vascular markings are normal. The lungs are clear.        Impression:       No active disease     Electronically Signed By-Blayne Vasquez On:6/17/2019 2:09 PM  This report was finalized on [id number] by  Blayne Vasquez, .        reviewed    ECG/EMG Results (most recent)     Procedure Component Value Units Date/Time    ECG 12 Lead [[id number]]  (Abnormal) Resulted:  06/17/19 1600     Updated:  06/17/19 1600        reviewed    CT Head Without Contrast  Narrative:    DATE OF EXAM:  6/17/2019 2:59 PM     PROCEDURE:   CT HEAD WO CONTRAST-     INDICATIONS:   Dizziness and near syncope     COMPARISON:  No Comparisons Available     TECHNIQUE:   Routine transaxial cuts  were obtained through the head without the  administration of contrast. Automated exposure control and iterative  reconstruction methods were used.      FINDINGS:  There are periventricular and deep white matter changes which could  reflect more chronic small vessel ischemic change. There is old  lacunar-type infarction in the left basal ganglia. There is no  underlying hemorrhage.     Evaluation of bone windows reveals the paranasal sinuses to be clear.  There is no acute abnormality of the calvarium.      Impression: 1.There are white matter changes which could reflect mild chronic small  vessel ischemic change.  2.Old lacunar type infarction left basal ganglia.     Electronically Signed By-Thai Dunne On:6/17/2019 3:19 PM  This report was finalized on [id number] by  Thai Dunne, .  XR Chest 1 View  Narrative: DATE OF EXAM:  6/17/2019 2:02 PM     PROCEDURE:  XR CHEST 1 VW-     INDICATIONS:  chest pain     COMPARISON:  No Comparisons Available     TECHNIQUE:   Single radiographic view of the chest was obtained.     FINDINGS:  The heart is enlarged. There are postoperative changes from midline  sternotomy. Coronary artery bypass graft markers are noted. The  pulmonary vascular markings are normal. The lungs are clear.      Impression: No active disease     Electronically Signed By-Blayne Vasquez On:6/17/2019 2:09 PM  This report was finalized on [id number] by  Blayne Vasquez, .      Assessment/Plan     Patient Active Problem List   Diagnosis   • Chest pain in adult     Bradycardia, bigeminy on EKG   - hold metoprolol  - consult cardiology for further recommendations    Chest pain  - serial troponins r/o ACS  - on IV nitro drip    Dizziness/weakness  - secondary to above  - falls precautions    CAD s/p CABG 4v  - cont home aspirin, plavix, statin    PVD s/p right fem-pop bypass  - on plavix, statin as above    Hypertension  - hold meds due to borderline bp and on nitro drip    Seizures  - cont home  keppra    Hypothyroidism  - check tsh and cont home synthroid    Arthritis  - cont home lyrica    H/H  - cont home protonix    DVT prophylaxis - lovenox      MARION Curry  06/17/19  7:51 PM

## 2019-06-17 NOTE — ED PROVIDER NOTES
Subjective   Pleasant 73-year-old female who awoke this morning feeling weak and not rested.  She states that she slept poorly.  She states that she has had shortness of breath and was lightheaded with position change and walking.  She states her heart rate has been slow and irregular.  She reports no fever chills or cough.  She denies diaphoresis or nausea.  She states that this may be the worst episode of palpitations she has ever had.  She denies night sweats or hemoptysis            Review of Systems   Constitutional: Negative for unexpected weight change.   Respiratory: Positive for chest tightness and shortness of breath.    Cardiovascular: Positive for chest pain and palpitations. Negative for leg swelling.   Gastrointestinal: Negative for abdominal pain.   Musculoskeletal: Negative for joint swelling and myalgias.       Past Medical History:   Diagnosis Date   • Arthritis    • Atrial fibrillation (CMS/HCC)    • Bronchitis    • Chickenpox    • COPD (chronic obstructive pulmonary disease) (CMS/HCC)    • Cramps of lower extremity     bilateral with weakness   • Heart disease    • Hepatitis C 2013    treatment x 11 months   • High cholesterol    • History of degenerative disc disease    • HLD (hyperlipidemia)    • HTN (hypertension)    • Hypothyroidism    • Measles    • Seizure disorder (CMS/HCC)    • Skin melanoma (CMS/HCC) 2013    s/p lymph node removal on the right   • Sleep apnea    • Stroke (CMS/HCC)    • Whooping cough        Allergies   Allergen Reactions   • Penicillins Hives       Past Surgical History:   Procedure Laterality Date   • APPENDECTOMY     • AXILLARY NODE DISSECTION Right 05/2014   • CATARACT EXTRACTION  01/2017    x 2   • CHOLECYSTECTOMY  2004   • COLONOSCOPY  04/05/2017   • CORONARY ANGIOPLASTY WITH STENT PLACEMENT      stent placement after CABG   • CORONARY ARTERY BYPASS GRAFT  01/20/2010   • CORONARY ARTERY BYPASS GRAFT     • FEMORAL POPLITEAL BYPASS Right 07/20/2017    Dr. Palmer   •  INCISIONAL HERNIA REPAIR  09/28/2016    [place]. Dr. West   • MOLE REMOVAL Right     arm   • OTHER SURGICAL HISTORY      laser surgery X 2   • PARTIAL HYSTERECTOMY     • THYROIDECTOMY, PARTIAL      goiter removed 1/2 thyroid       Family History   Problem Relation Age of Onset   • Hypertension Mother    • Cancer Mother    • Gout Mother    • Asthma Mother    • Cancer Father    • Heart disease Brother    • Diabetes Other    • Cancer Other    • Tuberculosis Other        Social History     Socioeconomic History   • Marital status:      Spouse name: Not on file   • Number of children: Not on file   • Years of education: Not on file   • Highest education level: Not on file   Tobacco Use   • Smoking status: Former Smoker     Last attempt to quit: 6/17/2009     Years since quitting: 10.0   Substance and Sexual Activity   • Alcohol use: No     Frequency: Never   • Drug use: No           Objective   Physical Exam  Alert [place] Coma Scale 15   HEENT: Pupils equal and reactive to light. Conjunctivae are not injected. normal tympanic membranes. Oropharynx and nares are normal.   Neck: Supple. Midline trachea. No JVD. No goiter.   Chest: Clear and equal breath sounds bilaterally regular rate and rhythm without murmur or rub.   Abdomen: Positive bowel sounds nontender nondistended. No rebound or peritoneal signs. No CVA tenderness.   Extremities no clubbing cyanosis or edema motor sensory exam is normal the full range of motion is intact   skin: Warm and dry, no rashes or petechia.   Lymphatic: No regional lymphadenopathy. No calf pain, swelling or Ga's sign  ECG 12 Lead    Date/Time: 6/17/2019 3:47 PM  Performed by: Sid Taylor MD  Authorized by: Sid Taylor MD   Interpreted by physician  Comparison: compared with previous ECG   Rhythm: sinus rhythm  Ectopy: bigeminy  Rate: bradycardic  QRS axis: normal  Other findings: LAE  Clinical impression: abnormal ECG                 ED Course                   MDM  Number of Diagnoses or Management Options     Amount and/or Complexity of Data Reviewed  Clinical lab tests: reviewed  Tests in the radiology section of CPT®: reviewed  Tests in the medicine section of CPT®: reviewed  Decide to obtain previous medical records or to obtain history from someone other than the patient: yes  Obtain history from someone other than the patient: yes  Review and summarize past medical records: yes  Discuss the patient with other providers: yes  Independent visualization of images, tracings, or specimens: yes    Risk of Complications, Morbidity, and/or Mortality  Presenting problems: high  Diagnostic procedures: high  Management options: high  General comments: Patient was stable in the emergency department.  The patient's bigeminy intermittently resolved but the patient remained relatively bradycardic the case was discussed with the hospitalist practitioner the patient will be admitted for serial troponin and EKG.  She was agreeable to this plan of treatment    Patient Progress  Patient progress: improved    Patient's metoprolol will need to be held least in the short run    Final diagnoses:   Chest pain in adult   Ventricular bigeminy   Bradycardia with 51-60 beats per minute            Sid Taylor MD  06/17/19 1600

## 2019-06-18 LAB
ANION GAP SERPL CALCULATED.3IONS-SCNC: 14 MMOL/L (ref 10–20)
BUN BLD-MCNC: 14 MG/DL (ref 8–20)
BUN/CREAT SERPL: 11.7 (ref 5.4–26.2)
CALCIUM SPEC-SCNC: 8.7 MG/DL (ref 8.9–10.3)
CHLORIDE SERPL-SCNC: 112 MMOL/L (ref 101–111)
CO2 SERPL-SCNC: 15 MMOL/L (ref 22–32)
CREAT BLD-MCNC: 1.2 MG/DL (ref 0.4–1)
GFR SERPL CREATININE-BSD FRML MDRD: 44 ML/MIN/1.73
GLUCOSE BLD-MCNC: 101 MG/DL (ref 65–99)
INR PPP: 1.09 (ref 0.9–1.1)
MAGNESIUM SERPL-MCNC: 1.9 MG/DL (ref 1.8–2.5)
POTASSIUM BLD-SCNC: 4.4 MMOL/L (ref 3.6–5.1)
PROTHROMBIN TIME: 11.1 SECONDS (ref 9.6–11.7)
SODIUM BLD-SCNC: 141 MMOL/L (ref 136–144)
TROPONIN I SERPL-MCNC: <0.03 NG/ML (ref 0–0.03)
TROPONIN I SERPL-MCNC: <0.03 NG/ML (ref 0–0.03)
TSH SERPL DL<=0.05 MIU/L-ACNC: 7.14 MIU/ML (ref 0.34–5.6)

## 2019-06-18 PROCEDURE — 93005 ELECTROCARDIOGRAM TRACING: CPT | Performed by: INTERNAL MEDICINE

## 2019-06-18 PROCEDURE — 93010 ELECTROCARDIOGRAM REPORT: CPT | Performed by: INTERNAL MEDICINE

## 2019-06-18 PROCEDURE — 84484 ASSAY OF TROPONIN QUANT: CPT | Performed by: NURSE PRACTITIONER

## 2019-06-18 PROCEDURE — 85610 PROTHROMBIN TIME: CPT | Performed by: INTERNAL MEDICINE

## 2019-06-18 PROCEDURE — 84443 ASSAY THYROID STIM HORMONE: CPT | Performed by: NURSE PRACTITIONER

## 2019-06-18 PROCEDURE — 80048 BASIC METABOLIC PNL TOTAL CA: CPT | Performed by: NURSE PRACTITIONER

## 2019-06-18 PROCEDURE — G0378 HOSPITAL OBSERVATION PER HR: HCPCS

## 2019-06-18 PROCEDURE — 99222 1ST HOSP IP/OBS MODERATE 55: CPT | Performed by: INTERNAL MEDICINE

## 2019-06-18 PROCEDURE — 83735 ASSAY OF MAGNESIUM: CPT | Performed by: NURSE PRACTITIONER

## 2019-06-18 PROCEDURE — 99225 PR SBSQ OBSERVATION CARE/DAY 25 MINUTES: CPT | Performed by: INTERNAL MEDICINE

## 2019-06-18 PROCEDURE — 25010000002 ONDANSETRON PER 1 MG: Performed by: NURSE PRACTITIONER

## 2019-06-18 RX ADMIN — Medication 3 ML: at 08:42

## 2019-06-18 RX ADMIN — MELATONIN 500 UNITS: at 08:43

## 2019-06-18 RX ADMIN — PREGABALIN 100 MG: 100 CAPSULE ORAL at 17:19

## 2019-06-18 RX ADMIN — PANTOPRAZOLE SODIUM 40 MG: 40 TABLET, DELAYED RELEASE ORAL at 05:26

## 2019-06-18 RX ADMIN — ATORVASTATIN CALCIUM 20 MG: 20 TABLET, FILM COATED ORAL at 20:33

## 2019-06-18 RX ADMIN — LEVOTHYROXINE SODIUM 25 MCG: 25 TABLET ORAL at 05:26

## 2019-06-18 RX ADMIN — SULFASALAZINE 1000 MG: 500 TABLET ORAL at 08:41

## 2019-06-18 RX ADMIN — SULFASALAZINE 1000 MG: 500 TABLET ORAL at 20:32

## 2019-06-18 RX ADMIN — NITROGLYCERIN 10 MCG/MIN: 10 INJECTION INTRAVENOUS at 14:50

## 2019-06-18 RX ADMIN — HYDROCHLOROTHIAZIDE 12.5 MG: 12.5 TABLET ORAL at 08:41

## 2019-06-18 RX ADMIN — PREGABALIN 100 MG: 100 CAPSULE ORAL at 08:41

## 2019-06-18 RX ADMIN — ONDANSETRON 4 MG: 2 INJECTION INTRAMUSCULAR; INTRAVENOUS at 13:59

## 2019-06-18 RX ADMIN — LEVETIRACETAM 500 MG: 500 TABLET, FILM COATED ORAL at 08:41

## 2019-06-18 RX ADMIN — LEVETIRACETAM 500 MG: 500 TABLET, FILM COATED ORAL at 20:33

## 2019-06-18 RX ADMIN — NITROGLYCERIN 0.4 MG: 0.4 TABLET SUBLINGUAL at 15:42

## 2019-06-18 RX ADMIN — PREGABALIN 100 MG: 100 CAPSULE ORAL at 20:33

## 2019-06-18 RX ADMIN — CLOPIDOGREL BISULFATE 75 MG: 75 TABLET ORAL at 08:41

## 2019-06-18 RX ADMIN — NITROGLYCERIN 10 MCG/MIN: 10 INJECTION INTRAVENOUS at 14:47

## 2019-06-18 RX ADMIN — Medication 3 ML: at 20:34

## 2019-06-18 NOTE — NURSING NOTE
STAT call placed to DR Winn. Notified of patient dizziness and PVC's. Orders received to restrt Nitro drip and STAT ECG. Dr Winn will see patient later today

## 2019-06-18 NOTE — PLAN OF CARE
Problem: Cardiac: ACS (Acute Coronary Syndrome) (Adult)  Goal: Signs and Symptoms of Listed Potential Problems Will be Absent, Minimized or Managed (Cardiac: ACS)  Outcome: Ongoing (interventions implemented as appropriate)  Patient is not currently having any chest pain

## 2019-06-18 NOTE — PROGRESS NOTES
Discharge Planning Assessment  [facility]     Patient Name: Delphine Rob  MRN: [id number]  Today's Date: 6/18/2019    Admit Date: 6/17/2019    Discharge Needs Assessment     Row Name 06/18/19 1413       Living Environment    Lives With  child(susi), adult Patient lives with her daughter    Current Living Arrangements  home/apartment/condo    Primary Care Provided by  child(susi)       Resource/Environmental Concerns    Transportation Concerns  -- Patient does not drive - her daugher transports        Discharge Needs Assessment    Equipment Currently Used at Home  wheelchair;walker, standard;cane, straight;shower chair Life Alert        Discharge Plan     Row Name 06/18/19 1418       Plan    Plan  Anticipate routine d/c to home with daughter        Destination      No service coordination in this encounter.      Durable Medical Equipment      No service coordination in this encounter.      Dialysis/Infusion      No service coordination in this encounter.      Home Medical Care      No service coordination in this encounter.      Therapy      No service coordination in this encounter.      Community Resources      No service coordination in this encounter.          Demographic Summary    No documentation.       Functional Status    No documentation.       Psychosocial    No documentation.       Abuse/Neglect    No documentation.       Legal    No documentation.       Substance Abuse    No documentation.       Patient Forms    No documentation.           Angelica Brewer RN

## 2019-06-18 NOTE — PLAN OF CARE
Problem: Patient Care Overview  Goal: Plan of Care Review  Outcome: Ongoing (interventions implemented as appropriate)   06/18/19 1347   Coping/Psychosocial   Plan of Care Reviewed With patient     Goal: Individualization and Mutuality  Outcome: Ongoing (interventions implemented as appropriate)    Goal: Discharge Needs Assessment  Outcome: Ongoing (interventions implemented as appropriate)   06/17/19 2031 06/18/19 1347   Discharge Needs Assessment   Patient/Family Anticipates Transition to --  home with help/services;home   Transportation Anticipated --  family or friend will provide   Disability   Equipment Currently Used at Home wheelchair;walker, standard --      Goal: Interprofessional Rounds/Family Conf  Outcome: Ongoing (interventions implemented as appropriate)   06/18/19 1347   Interdisciplinary Rounds/Family Conf   Participants ;nursing;social work/services;pharmacy       Problem: Fall Risk (Adult)  Goal: Identify Related Risk Factors and Signs and Symptoms  Outcome: Ongoing (interventions implemented as appropriate)   06/18/19 1347   Fall Risk (Adult)   Related Risk Factors (Fall Risk) gait/mobility problems     Goal: Absence of Fall  Outcome: Ongoing (interventions implemented as appropriate)   06/18/19 1347   Fall Risk (Adult)   Absence of Fall making progress toward outcome       Problem: Syncope (Adult)  Goal: Physical Safety/Health Maintenance  Outcome: Ongoing (interventions implemented as appropriate)   06/18/19 1347   Syncope (Adult)   Physical Safety/Health Maintenance making progress toward outcome     Goal: Optimal Emotional/Functional [place]  Outcome: Ongoing (interventions implemented as appropriate)   06/18/19 1347   Syncope (Adult)   Optimal Emotional/Functional [place] making progress toward outcome       Problem: Breathing Pattern Ineffective (Adult)  Goal: Identify Related Risk Factors and Signs and Symptoms  Outcome: Ongoing (interventions implemented as  appropriate)   06/18/19 1347   Breathing Pattern Ineffective (Adult)   Related Risk Factors (Breathing Pattern Ineffective) advanced age   Signs and Symptoms (Breathing Pattern Ineffective) activity intolerance     Goal: Effective Oxygenation/Ventilation  Outcome: Ongoing (interventions implemented as appropriate)   06/18/19 1347   Breathing Pattern Ineffective (Adult)   Effective Oxygenation/Ventilation making progress toward outcome

## 2019-06-18 NOTE — PLAN OF CARE
Problem: Syncope (Adult)  Goal: Physical Safety/Health Maintenance  Outcome: Ongoing (interventions implemented as appropriate)

## 2019-06-18 NOTE — PLAN OF CARE
Problem: Breathing Pattern Ineffective (Adult)  Goal: Anxiety/Fear Reduction  Outcome: Ongoing (interventions implemented as appropriate)  Reduction of fear is being diminished.

## 2019-06-18 NOTE — PLAN OF CARE
Problem: Syncope (Adult)  Goal: Identify Related Risk Factors and Signs and Symptoms  Outcome: Ongoing (interventions implemented as appropriate)  Patient has had no episodes of syncope

## 2019-06-18 NOTE — PLAN OF CARE
Problem: Syncope (Adult)  Goal: Optimal Emotional/Functional [place]  Outcome: Ongoing (interventions implemented as appropriate)

## 2019-06-18 NOTE — CONSULTS
Referring Provider: Bethany Gipson MD  Reason for Consultation: Chest pain    Patient Care Team:  Keith Alston MD as PCP - General    Chief complaint   Chest pain  Subjective .     History of present illness:  Delphine Rob is a 73 y.o. female who presents with history of previous coronary bypass Surgery.  Per the emergency room history of feeling dizzy and having chest discomfort which is mostly located in the left precordial area and is described as heaviness and tightness sensation with radiation to the left arm.  Is not associate with any syncope.  Patient had sweating with it.  No other associated aggravating or elevating factors.  Patient had been having off-and-on chest discomfort since admission.  Patient is having frequent intermittent contractions.  Monitor showed premature ventricular options.  Patient started on aggressive hydration with relief of chest discomfort.  Patient had dizziness when she was negative stand up at the bedside.  History assessment personally.  ROS   Since I have last seen, the patient has been without any shortness of breath, palpitations,or syncope.  Denies having any headache ,abdominal pain ,nausea, vomiting , diarrhea constipation, loss of weight or loss of appetite.  Denies having any excessive bruising ,hematuria or blood in the stool.  Review of all systems negative except as indicated    History  Past Medical History:   Diagnosis Date   • Arthritis    • Atrial fibrillation (CMS/HCC)    • Bronchitis    • Chickenpox    • COPD (chronic obstructive pulmonary disease) (CMS/HCC)    • Cramps of lower extremity     bilateral with weakness   • Heart disease    • Hepatitis C 2013    treatment x 11 months   • High cholesterol    • History of degenerative disc disease    • HLD (hyperlipidemia)    • HTN (hypertension)    • Hypothyroidism    • Measles    • Seizure disorder (CMS/HCC)    • Skin melanoma (CMS/HCC) 2013    s/p lymph node removal on the right   • Sleep apnea     • Stroke (CMS/HCC)    • Whooping cough        Past Surgical History:   Procedure Laterality Date   • APPENDECTOMY     • AXILLARY NODE DISSECTION Right 05/2014   • CATARACT EXTRACTION  01/2017    x 2   • CHOLECYSTECTOMY  2004   • COLONOSCOPY  04/05/2017   • CORONARY ANGIOPLASTY WITH STENT PLACEMENT      stent placement after CABG   • CORONARY ARTERY BYPASS GRAFT  01/20/2010   • CORONARY ARTERY BYPASS GRAFT     • FEMORAL POPLITEAL BYPASS Right 07/20/2017    Dr. Palmer   • INCISIONAL HERNIA REPAIR  09/28/2016    lap. Dr. West   • MOLE REMOVAL Right     arm   • OTHER SURGICAL HISTORY      laser surgery X 2   • PARTIAL HYSTERECTOMY     • THYROIDECTOMY, PARTIAL      goiter removed 1/2 thyroid       Family History   Problem Relation Age of Onset   • Hypertension Mother    • Cancer Mother    • Gout Mother    • Asthma Mother    • Cancer Father    • Heart disease Brother    • Diabetes Other    • Cancer Other    • Tuberculosis Other        Social History     Tobacco Use   • Smoking status: Former Smoker     Last attempt to quit: 6/17/2009     Years since quitting: 10.0   Substance Use Topics   • Alcohol use: No     Frequency: Never   • Drug use: No        Medications Prior to Admission   Medication Sig Dispense Refill Last Dose   • amLODIPine (NORVASC) 5 MG tablet Take 5 mg by mouth Daily.   6/17/2019 at Unknown time   • atorvastatin (LIPITOR) 10 MG tablet Take 20 mg by mouth Every Night.   6/16/2019 at Unknown time   • Cholecalciferol 69469 units tablet Take 2,000 Units by mouth 2 (Two) Times a Day.   6/17/2019 at Unknown time   • clopidogrel (PLAVIX) 75 MG tablet Take 75 mg by mouth Daily.   6/17/2019 at Unknown time   • ezetimibe (ZETIA) 10 MG tablet Take 10 mg by mouth Daily.   6/17/2019 at Unknown time   • hydrochlorothiazide (HYDRODIURIL) 25 MG tablet Take 12.5 mg by mouth Daily.   6/17/2019 at Unknown time   • levETIRAcetam (KEPPRA) 500 MG tablet Take 500 mg by mouth 2 (Two) Times a Day.   6/17/2019 at Unknown time  "  • levothyroxine (SYNTHROID, LEVOTHROID) 25 MCG tablet Take 25 mcg by mouth Daily.   6/17/2019 at Unknown time   • lisinopril (PRINIVIL,ZESTRIL) 20 MG tablet Take 20 mg by mouth Daily.   6/17/2019 at Unknown time   • metoprolol succinate XL (TOPROL-XL) 100 MG 24 hr tablet Take 100 mg by mouth Daily.   6/17/2019 at Unknown time   • pantoprazole (PROTONIX) 20 MG EC tablet Take 20 mg by mouth Daily.   6/17/2019 at Unknown time   • pregabalin (LYRICA) 100 MG capsule Take 100 mg by mouth 3 (Three) Times a Day.   6/17/2019 at Unknown time   • sulfaSALAzine (AZULFIDINE) 500 MG tablet Take 1,000 mg by mouth 2 (Two) Times a Day.   6/17/2019 at Unknown time   • tiZANidine (ZANAFLEX) 4 MG tablet Take 2 mg by mouth At Night As Needed for Muscle Spasms.   6/16/2019 at Unknown time         Penicillins    Scheduled Meds:  atorvastatin 20 mg Oral Nightly   cholecalciferol 500 Units Oral Daily   clopidogrel 75 mg Oral Daily   hydrochlorothiazide 12.5 mg Oral Daily   levETIRAcetam 500 mg Oral Q12H   levothyroxine 25 mcg Oral Q AM   pantoprazole 40 mg Oral Q AM   pregabalin 100 mg Oral TID   sodium chloride 3 mL Intravenous Q12H   sulfaSALAzine 1,000 mg Oral BID     Continuous Infusions:  nitroglycerin 10-50 mcg/min Last Rate: 10 mcg/min (06/18/19 1647)     PRN Meds:.•  acetaminophen  •  melatonin  •  nitroglycerin  •  ondansetron **OR** ondansetron  •  [COMPLETED] Insert peripheral IV **AND** sodium chloride  •  sodium chloride  •  tiZANidine    Objective     VITAL SIGNS  Vitals:    06/18/19 1500 06/18/19 1600 06/18/19 1700 06/18/19 1800   BP: (!) 146/102 132/84 135/71 132/80   BP Location:       Patient Position:       Pulse: (!) 42 54 52 54   Resp:       Temp:       TempSrc:       SpO2: 96% 95% 96% 95%   Weight:       Height:           Flowsheet Rows      First Filed Value   Admission Height  157.5 cm (62\") Documented at 06/17/2019 1250   Admission Weight  78.5 kg (173 lb 1 oz) Documented at 06/17/2019 1250           TELEMETRY: " Sinus rhythm premature atrial contractions.  Physical Exam:  The patient is alert, oriented and in no distress.  Vital signs as noted above.  Head and neck revealed no carotid bruits or jugular venous distention.  No thyromegaly or lymph adenopathy is present  Lungs clear.  No wheezing.  Breath sounds are normal bilaterally.  Heart normal first and second heart sounds.No murmur.  No precordial rub is present.  No gallop is present.  Abdomen soft and nontender.  No organomegaly is present.  Extremities with good peripheral pulses without any pedal edema.  Skin warm and dry.  Musculoskeletal system is grossly normal  CNS grossly normal      Results Review:   I reviewed the patient's new clinical results.  Lab Results (last 24 hours)     Procedure Component Value Units Date/Time    Magnesium [[id number]]  (Normal) Collected:  06/18/19 0551    Specimen:  Blood Updated:  06/18/19 1133     Magnesium 1.9 mg/dL      Comment: Specimen hemolyzed.  Results may be affected.       Basic Metabolic Panel [[id number]]  (Abnormal) Collected:  06/18/19 0551    Specimen:  Blood Updated:  06/18/19 1132     Glucose 101 mg/dL      BUN 14 mg/dL      Creatinine 1.20 mg/dL      Sodium 141 mmol/L      Potassium 4.4 mmol/L      Comment: Specimen hemolyzed.  Results may be affected.        Chloride 112 mmol/L      CO2 15.0 mmol/L      Calcium 8.7 mg/dL      eGFR Non African Amer 44 mL/min/1.73      BUN/Creatinine Ratio 11.7     Anion Gap 14.0 mmol/L     Narrative:       The MDRD GFR formula is only valid for adults with stable renal function between ages 18 and 70.    TSH [[id number]]  (Abnormal) Collected:  06/18/19 0551    Specimen:  Blood Updated:  06/18/19 0745     TSH 7.140 mIU/mL      Comment: Results may be falsely decreased if patient taking Biotin.       Troponin [[id number]]  (Normal) Collected:  06/18/19 0552    Specimen:  Blood Updated:  06/18/19 0701     Troponin I <0.030 ng/mL     Narrative:       Troponin I Reference  Range:    0.00-0.03  Negative.  Repeat testing in 4-6 hours if clinically indicated.    0.04-0.29  Suspicious for myocardial injury. Serial measurements and clinical  correlation may be necessary to confirm or exclude diagnosis of acute  coronary syndrome.  Repeat testing in 4-6 hours if indicated.     >0.29 Consistent with myocardial injury.  Recommend clinical and laboratory correlation.     Results my be falsely decreased if patient taking Biotin.     Troponin [[id number]]  (Normal) Collected:  06/17/19 2323    Specimen:  Blood Updated:  06/18/19 0053     Troponin I <0.030 ng/mL     Narrative:       Troponin I Reference Range:    0.00-0.03  Negative.  Repeat testing in 4-6 hours if clinically indicated.    0.04-0.29  Suspicious for myocardial injury. Serial measurements and clinical  correlation may be necessary to confirm or exclude diagnosis of acute  coronary syndrome.  Repeat testing in 4-6 hours if indicated.     >0.29 Consistent with myocardial injury.  Recommend clinical and laboratory correlation.     Results my be falsely decreased if patient taking Biotin.     Troponin [[id number]]  (Normal) Collected:  06/17/19 1911    Specimen:  Blood Updated:  06/17/19 1955     Troponin I <0.030 ng/mL     Narrative:       Troponin I Reference Range:    0.00-0.03  Negative.  Repeat testing in 4-6 hours if clinically indicated.    0.04-0.29  Suspicious for myocardial injury. Serial measurements and clinical  correlation may be necessary to confirm or exclude diagnosis of acute  coronary syndrome.  Repeat testing in 4-6 hours if indicated.     >0.29 Consistent with myocardial injury.  Recommend clinical and laboratory correlation.     Results my be falsely decreased if patient taking Biotin.           Imaging Results (last 24 hours)     ** No results found for the last 24 hours. **      EKG independently reviewed.-Sinus rhythm nonspecific ST-T wave changes with contractions.    EKG              I personally viewed and  interpreted the patient's EKG/Telemetry data:    ECHOCARDIOGRAM:             STRESS MYOVIEW:    Cardiolite (Tc-99m Sestamibi) stress test          CARDIAC CATHETERIZATION:    Procedure to perform: PROCEDURES; CARDIAC CATHETERIZATION:19961    OTHER:         Assessment/Plan    /////////////////  impression  ---------------------------  -Chest discomfort suggestive of unstable angina factors.  EKG showed no acute changes except PVCs.    Troponin levels are negative    -Status post CABG [facility]/20/2010 at [facility].  Status post stent placement after CABG ( details not available)     Echocardiogram 11/13/2017 revealed mild mitral regurgitation and normal left ventricular function.  Carri scan Cardiolite test is negative for myocardial ischemia 11/13/2017     -Intermittent atrial fibrillation.  Patient is maintaining sinus rhythm.       -History of  stroke from which patient has recovered.      -Hypertension dyslipidemia and hypothyroidism      -Status post thyroidectomy for cyst cholecystectomy and hysterectomy.      -History of hepatitis-C      -Allergy to penicillin      -Former smoker quit smoking October 2013.      -Status post  Malignant melanoma removal from right elbow area.  Patient did not need chemotherapy     -history of peripheral vascular disease.  CTA 06/30/2017 revealed right superficial femoral artery occlusion  ---------------.  Plan  ----------------------  EKG showed sinus rhythm premature ventricle contraction nonspecific ST- T changes normal axis  Patient having symptoms consistent with unstable angina  Continue with intravenous nitroglycerin.  Patient had a cardiac catheterization coronary with some possible pain  Risk benefits pros and cons of the procedure were discussed with patient.  Patient is on Plavix.  Further management on patient's progress.      Active Problems:    Chest pain in adult        Real Winn MD  06/18/19  6:13 PM

## 2019-06-18 NOTE — PROGRESS NOTES
"Hospitalist Team      Patient Care Team:  Keith Alston MD as PCP - General        Chief Complaint:  Palpitations, dizziness    Subjective    Interval History and ROS:     Delphine Rob is a 73 y.o. female who presents with dizziness, palpitations, chest pains, headache, and nausea.  Her chest pain was a dull aching pain under her left breast.  She has also had some associated shortness of breath, but that has since resolved.  She is feeling better this morning, but still having some palpitations.    History taken from: patient    Review of Systems   Constitutional: Negative.    Respiratory: Negative for chest tightness and shortness of breath.    Cardiovascular: Positive for chest pain and palpitations.   Gastrointestinal: Positive for nausea. Negative for abdominal pain, constipation, diarrhea and vomiting.   Genitourinary: Negative.    Skin: Negative.    Neurological: Positive for dizziness, light-headedness and headaches. Negative for weakness and numbness.   Psychiatric/Behavioral: Negative.          Objective    Vital Signs  Vital Signs (last 24 hours)       06/17 0700  -  06/18 0659 06/18 0700  -  06/18 1055   Most Recent    Temp (°F) 97.6 -  98.3       97.6 (36.4)    Heart Rate (!)40 -  79    51 -  61     54    Resp 10 -  21       10    BP 98/66 -  146/65    121/78 -  127/69     127/69    SpO2 (%) 90 -  98    93 -  95     93        Oxygen Therapy  SpO2: 93 %  Pulse Oximetry Type: Continuous  Device (Oxygen Therapy): room air  Flowsheet Rows      First Filed Value   Admission Height  157.5 cm (62\") Documented at 06/17/2019 1250   Admission Weight  78.5 kg (173 lb 1 oz) Documented at 06/17/2019 1250        Intake & Output (last 3 days)       06/15 0701 - 06/16 0700 06/16 0701 - 06/17 0700 06/17 0701 - 06/18 0700 06/18 0701 - 06/19 0700    P.O.   240 240    Total Intake(mL/kg)   240 (3) 240 (3)    Urine (mL/kg/hr)    250 (0.8)    Stool    0    Total Output    250    Net   +240 -10            Urine " Unmeasured Occurrence   3 x     Stool Unmeasured Occurrence    1 x        Lines, Drains & Airways    Active LDAs     Name:   Placement date:   Placement time:   Site:   Days:    Peripheral IV 06/17/19 1334 Left Hand   06/17/19    1334    Hand   less than 1                Physical Exam:  Physical Exam   Constitutional: She is oriented to person, place, and time. She appears well-developed and well-nourished. No distress.   HENT:   Head: Normocephalic and atraumatic.   Mouth/Throat: Oropharynx is clear and moist.   Eyes: Conjunctivae and EOM are normal. Pupils are equal, round, and reactive to light.   Neck: Normal range of motion. Neck supple. No JVD present.   Cardiovascular: An irregular rhythm present. Bradycardia present.   Pulmonary/Chest: Effort normal and breath sounds normal. No respiratory distress. She has no wheezes. She has no rales.   Abdominal: Soft. Bowel sounds are normal. She exhibits no distension. There is no tenderness.   Musculoskeletal: Normal range of motion. She exhibits no edema.   Neurological: She is alert and oriented to person, place, and time.   Skin: Skin is warm and dry. No rash noted.         Results Review:     I reviewed the patient's new clinical results.    Results from last 7 days   Lab Units 06/17/19  1334   WBC 10*3/mm3 6.30   HEMOGLOBIN g/dL 14.2   HEMATOCRIT % 42.0   PLATELETS 10*3/mm3 163   INR  1.08     Results from last 7 days   Lab Units 06/18/19  0552 06/17/19  2323 06/17/19  1911 06/17/19  1334   SODIUM mmol/L  --   --   --  137   POTASSIUM mmol/L  --   --   --  4.4   CHLORIDE mmol/L  --   --   --  105   CO2 mmol/L  --   --   --  23.0   BUN mg/dL  --   --   --  15   CREATININE mg/dL  --   --   --  1.10*   GLUCOSE mg/dL  --   --   --  82   CALCIUM mg/dL  --   --   --  9.2   ALT (SGPT) U/L  --   --   --  13*   AST (SGOT) U/L  --   --   --  28   TROPONIN I ng/mL <0.030 <0.030 <0.030 <0.030       Microbiology Results Abnormal     None        Ct Head Without  Contrast    Result Date: 6/17/2019  1.There are white matter changes which could reflect mild chronic small vessel ischemic change. 2.Old lacunar type infarction left basal ganglia.  Electronically Signed By-Thai Dunne On:6/17/2019 3:19 PM This report was finalized on [id number] by  Thai Dunne, .    Xr Chest 1 View    Result Date: 6/17/2019  No active disease  Electronically Signed By-Blayne Vasquez On:6/17/2019 2:09 PM This report was finalized on [id number] by  Jake Carver           Xrays, labs reviewed personally by physician.    ECG/EMG Results (most recent)     Procedure Component Value Units Date/Time    ECG 12 Lead [[id number]] Collected:  06/17/19 1257     Updated:  06/17/19 2234    Narrative:       HEART RATE= 61  bpm  RR Interval= 991  ms  MN Interval= 186  ms  P Horizontal Axis= -28  deg  P Front Axis= 53  deg  QRSD Interval= 90  ms  QT Interval= 472  ms  QRS Axis= 5  deg  T Wave Axis= 35  deg  - ABNORMAL ECG -  Sinus rhythm  Ventricular bigeminy  Probable left atrial enlargement  Inferior infarct, old  Electronically Signed By:   Date and Time of Study: 2019-06-17 12:57:21            Medication Review:   I have reviewed the patient's current medication list  Scheduled Meds:  atorvastatin 20 mg Oral Nightly   cholecalciferol 500 Units Oral Daily   clopidogrel 75 mg Oral Daily   hydrochlorothiazide 12.5 mg Oral Daily   levETIRAcetam 500 mg Oral Q12H   levothyroxine 25 mcg Oral Q AM   pantoprazole 40 mg Oral Q AM   pregabalin 100 mg Oral TID   sodium chloride 3 mL Intravenous Q12H   sulfaSALAzine 1,000 mg Oral BID     Continuous Infusions:  nitroglycerin 10-50 mcg/min Last Rate: Stopped (06/18/19 0013)     PRN Meds:.•  acetaminophen  •  melatonin  •  nitroglycerin  •  ondansetron **OR** ondansetron  •  [COMPLETED] Insert peripheral IV **AND** sodium chloride  •  sodium chloride  •  tiZANidine      Assessment/Plan     Chest pain in adult      Bradycardia, bigeminy on EKG   - hold metoprolol  -  Cardiology consult pending     Chest pain  - serial troponins negative x3 - ruled out for ACS  - on IV nitro drip     Dizziness/weakness  - secondary to above  - falls precautions     CAD s/p CABG 4v  - cont home aspirin, plavix, statin     PVD s/p right fem-pop bypass  - on plavix, statin as above     Hypertension  - hold meds due to borderline bp and on nitro drip     Seizures  - cont home keppra     Hypothyroidism  - check tsh and cont home synthroid     Arthritis  - cont home lyrica     H/H  - cont home protonix     DVT prophylaxis - lovenox          Plan for disposition: will go home at discharge pending cardiac evaluation    Bethany Gipson MD  06/18/19  10:55 AM

## 2019-06-19 ENCOUNTER — HOSPITAL ENCOUNTER (OUTPATIENT)
Dept: CARDIOLOGY | Facility: HOSPITAL | Age: 74
Discharge: HOME | End: 2019-06-19

## 2019-06-19 ENCOUNTER — HOSPITAL ENCOUNTER (OUTPATIENT)
Dept: CT IMAGING | Facility: HOSPITAL | Age: 74
Setting detail: OBSERVATION
Discharge: HOME | End: 2019-06-19

## 2019-06-19 ENCOUNTER — HOSPITAL ENCOUNTER (OUTPATIENT)
Dept: GENERAL RADIOLOGY | Facility: HOSPITAL | Age: 74
Setting detail: OBSERVATION
Discharge: HOME | End: 2019-06-19

## 2019-06-19 ENCOUNTER — TELEPHONE (OUTPATIENT)
Dept: RHEUMATOLOGY | Facility: CLINIC | Age: 74
End: 2019-06-19

## 2019-06-19 VITALS
BODY MASS INDEX: 31.83 KG/M2 | HEIGHT: 62 IN | DIASTOLIC BLOOD PRESSURE: 63 MMHG | WEIGHT: 173 LBS | SYSTOLIC BLOOD PRESSURE: 122 MMHG

## 2019-06-19 PROBLEM — R00.1 BRADYCARDIA WITH 51-60 BEATS PER MINUTE: Status: ACTIVE | Noted: 2019-06-17

## 2019-06-19 PROBLEM — I49.8 VENTRICULAR BIGEMINY: Status: ACTIVE | Noted: 2019-06-17

## 2019-06-19 LAB
ANION GAP SERPL CALCULATED.3IONS-SCNC: 8 MMOL/L (ref 10–20)
ANION GAP SERPL CALCULATED.3IONS-SCNC: 9 MMOL/L (ref 10–20)
APTT PPP: 25.9 SECONDS (ref 24–31)
APTT PPP: 26.1 SECONDS (ref 24–31)
BASOPHILS # BLD AUTO: 0 10*3/MM3 (ref 0–0.2)
BASOPHILS # BLD AUTO: 0 10*3/MM3 (ref 0–0.2)
BASOPHILS NFR BLD AUTO: 0.4 % (ref 0–1.5)
BASOPHILS NFR BLD AUTO: 0.6 % (ref 0–1.5)
BUN BLD-MCNC: 7 MG/DL (ref 8–20)
BUN BLD-MCNC: 9 MG/DL (ref 8–20)
BUN/CREAT SERPL: 7.8 (ref 5.4–26.2)
BUN/CREAT SERPL: 9 (ref 5.4–26.2)
CALCIUM SPEC-SCNC: 8.5 MG/DL (ref 8.9–10.3)
CALCIUM SPEC-SCNC: 9 MG/DL (ref 8.9–10.3)
CHLORIDE SERPL-SCNC: 106 MMOL/L (ref 101–111)
CHLORIDE SERPL-SCNC: 107 MMOL/L (ref 101–111)
CO2 SERPL-SCNC: 23 MMOL/L (ref 22–32)
CO2 SERPL-SCNC: 23 MMOL/L (ref 22–32)
CREAT BLD-MCNC: 0.9 MG/DL (ref 0.4–1)
CREAT BLD-MCNC: 1 MG/DL (ref 0.4–1)
DEPRECATED RDW RBC AUTO: 42 FL (ref 37–54)
DEPRECATED RDW RBC AUTO: 42.9 FL (ref 37–54)
EOSINOPHIL # BLD AUTO: 0.1 10*3/MM3 (ref 0–0.4)
EOSINOPHIL # BLD AUTO: 0.1 10*3/MM3 (ref 0–0.4)
EOSINOPHIL NFR BLD AUTO: 1.5 % (ref 0.3–6.2)
EOSINOPHIL NFR BLD AUTO: 1.6 % (ref 0.3–6.2)
ERYTHROCYTE [DISTWIDTH] IN BLOOD BY AUTOMATED COUNT: 12.6 % (ref 12.3–15.4)
ERYTHROCYTE [DISTWIDTH] IN BLOOD BY AUTOMATED COUNT: 12.8 % (ref 12.3–15.4)
GFR SERPL CREATININE-BSD FRML MDRD: 55 ML/MIN/1.73
GFR SERPL CREATININE-BSD FRML MDRD: 62 ML/MIN/1.73
GLUCOSE BLD-MCNC: 113 MG/DL (ref 65–99)
GLUCOSE BLD-MCNC: 114 MG/DL (ref 65–99)
GLUCOSE BLDC GLUCOMTR-MCNC: 100 MG/DL (ref 70–105)
HCT VFR BLD AUTO: 42.7 % (ref 34–46.6)
HCT VFR BLD AUTO: 46.1 % (ref 34–46.6)
HGB BLD-MCNC: 14.1 G/DL (ref 12–15.9)
HGB BLD-MCNC: 15.5 G/DL (ref 12–15.9)
HOLD SPECIMEN: NORMAL
HOLD SPECIMEN: NORMAL
INR PPP: 1.04 (ref 0.9–1.1)
INR PPP: 1.06 (ref 0.9–1.1)
LYMPHOCYTES # BLD AUTO: 2.2 10*3/MM3 (ref 0.7–3.1)
LYMPHOCYTES # BLD AUTO: 2.3 10*3/MM3 (ref 0.7–3.1)
LYMPHOCYTES NFR BLD AUTO: 31.6 % (ref 19.6–45.3)
LYMPHOCYTES NFR BLD AUTO: 42.6 % (ref 19.6–45.3)
MCH RBC QN AUTO: 31.7 PG (ref 26.6–33)
MCH RBC QN AUTO: 31.8 PG (ref 26.6–33)
MCHC RBC AUTO-ENTMCNC: 33 G/DL (ref 31.5–35.7)
MCHC RBC AUTO-ENTMCNC: 33.6 G/DL (ref 31.5–35.7)
MCV RBC AUTO: 94.4 FL (ref 79–97)
MCV RBC AUTO: 96.6 FL (ref 79–97)
MONOCYTES # BLD AUTO: 0.6 10*3/MM3 (ref 0.1–0.9)
MONOCYTES # BLD AUTO: 0.7 10*3/MM3 (ref 0.1–0.9)
MONOCYTES NFR BLD AUTO: 11.1 % (ref 5–12)
MONOCYTES NFR BLD AUTO: 9.8 % (ref 5–12)
NEUTROPHILS # BLD AUTO: 2.4 10*3/MM3 (ref 1.7–7)
NEUTROPHILS # BLD AUTO: 3.9 10*3/MM3 (ref 1.7–7)
NEUTROPHILS NFR BLD AUTO: 44.1 % (ref 42.7–76)
NEUTROPHILS NFR BLD AUTO: 56.7 % (ref 42.7–76)
NRBC BLD AUTO-RTO: 0.1 /100 WBC (ref 0–0.2)
NRBC BLD AUTO-RTO: 0.3 /100 WBC (ref 0–0.2)
PLATELET # BLD AUTO: 142 10*3/MM3 (ref 140–450)
PLATELET # BLD AUTO: 159 10*3/MM3 (ref 140–450)
PMV BLD AUTO: 10.2 FL (ref 6–12)
PMV BLD AUTO: 9.9 FL (ref 6–12)
POTASSIUM BLD-SCNC: 3.3 MMOL/L (ref 3.6–5.1)
POTASSIUM BLD-SCNC: 3.6 MMOL/L (ref 3.6–5.1)
PROTHROMBIN TIME: 10.6 SECONDS (ref 9.6–11.7)
PROTHROMBIN TIME: 10.8 SECONDS (ref 9.6–11.7)
RBC # BLD AUTO: 4.42 10*6/MM3 (ref 3.77–5.28)
RBC # BLD AUTO: 4.88 10*6/MM3 (ref 3.77–5.28)
SODIUM BLD-SCNC: 137 MMOL/L (ref 136–144)
SODIUM BLD-SCNC: 139 MMOL/L (ref 136–144)
WBC NRBC COR # BLD: 5.4 10*3/MM3 (ref 3.4–10.8)
WBC NRBC COR # BLD: 6.8 10*3/MM3 (ref 3.4–10.8)

## 2019-06-19 PROCEDURE — 99152 MOD SED SAME PHYS/QHP 5/>YRS: CPT | Performed by: INTERNAL MEDICINE

## 2019-06-19 PROCEDURE — 85025 COMPLETE CBC W/AUTO DIFF WBC: CPT | Performed by: INTERNAL MEDICINE

## 2019-06-19 PROCEDURE — 99233 SBSQ HOSP IP/OBS HIGH 50: CPT | Performed by: INTERNAL MEDICINE

## 2019-06-19 PROCEDURE — 99222 1ST HOSP IP/OBS MODERATE 55: CPT | Performed by: PSYCHIATRY & NEUROLOGY

## 2019-06-19 PROCEDURE — 82962 GLUCOSE BLOOD TEST: CPT

## 2019-06-19 PROCEDURE — 25010000002 MIDAZOLAM PER 1 MG: Performed by: INTERNAL MEDICINE

## 2019-06-19 PROCEDURE — 93459 L HRT ART/GRFT ANGIO: CPT | Performed by: INTERNAL MEDICINE

## 2019-06-19 PROCEDURE — 4A023N7 MEASUREMENT OF CARDIAC SAMPLING AND PRESSURE, LEFT HEART, PERCUTANEOUS APPROACH: ICD-10-PCS | Performed by: INTERNAL MEDICINE

## 2019-06-19 PROCEDURE — B2131ZZ FLUOROSCOPY OF MULTIPLE CORONARY ARTERY BYPASS GRAFTS USING LOW OSMOLAR CONTRAST: ICD-10-PCS | Performed by: INTERNAL MEDICINE

## 2019-06-19 PROCEDURE — 25010000002 FENTANYL CITRATE (PF) 100 MCG/2ML SOLUTION: Performed by: INTERNAL MEDICINE

## 2019-06-19 PROCEDURE — C1769 GUIDE WIRE: HCPCS | Performed by: INTERNAL MEDICINE

## 2019-06-19 PROCEDURE — 71045 X-RAY EXAM CHEST 1 VIEW: CPT

## 2019-06-19 PROCEDURE — 93306 TTE W/DOPPLER COMPLETE: CPT

## 2019-06-19 PROCEDURE — 85730 THROMBOPLASTIN TIME PARTIAL: CPT | Performed by: INTERNAL MEDICINE

## 2019-06-19 PROCEDURE — 70498 CT ANGIOGRAPHY NECK: CPT

## 2019-06-19 PROCEDURE — B2181ZZ FLUOROSCOPY OF LEFT INTERNAL MAMMARY BYPASS GRAFT USING LOW OSMOLAR CONTRAST: ICD-10-PCS | Performed by: INTERNAL MEDICINE

## 2019-06-19 PROCEDURE — 70450 CT HEAD/BRAIN W/O DYE: CPT

## 2019-06-19 PROCEDURE — B2151ZZ FLUOROSCOPY OF LEFT HEART USING LOW OSMOLAR CONTRAST: ICD-10-PCS | Performed by: INTERNAL MEDICINE

## 2019-06-19 PROCEDURE — 80048 BASIC METABOLIC PNL TOTAL CA: CPT | Performed by: INTERNAL MEDICINE

## 2019-06-19 PROCEDURE — G0378 HOSPITAL OBSERVATION PER HR: HCPCS

## 2019-06-19 PROCEDURE — 99232 SBSQ HOSP IP/OBS MODERATE 35: CPT | Performed by: INTERNAL MEDICINE

## 2019-06-19 PROCEDURE — B2111ZZ FLUOROSCOPY OF MULTIPLE CORONARY ARTERIES USING LOW OSMOLAR CONTRAST: ICD-10-PCS | Performed by: INTERNAL MEDICINE

## 2019-06-19 PROCEDURE — 93458 L HRT ARTERY/VENTRICLE ANGIO: CPT | Performed by: INTERNAL MEDICINE

## 2019-06-19 PROCEDURE — 85610 PROTHROMBIN TIME: CPT | Performed by: INTERNAL MEDICINE

## 2019-06-19 PROCEDURE — C1894 INTRO/SHEATH, NON-LASER: HCPCS | Performed by: INTERNAL MEDICINE

## 2019-06-19 PROCEDURE — 70496 CT ANGIOGRAPHY HEAD: CPT

## 2019-06-19 PROCEDURE — 0 IOPAMIDOL PER 1 ML: Performed by: INTERNAL MEDICINE

## 2019-06-19 PROCEDURE — 93306 TTE W/DOPPLER COMPLETE: CPT | Performed by: INTERNAL MEDICINE

## 2019-06-19 RX ORDER — ACETAMINOPHEN 325 MG/1
650 TABLET ORAL EVERY 4 HOURS PRN
Status: DISCONTINUED | OUTPATIENT
Start: 2019-06-19 | End: 2019-06-21 | Stop reason: HOSPADM

## 2019-06-19 RX ORDER — ASPIRIN 81 MG/1
81 TABLET, CHEWABLE ORAL DAILY
Status: DISCONTINUED | OUTPATIENT
Start: 2019-06-19 | End: 2019-06-21 | Stop reason: HOSPADM

## 2019-06-19 RX ORDER — SODIUM CHLORIDE 9 MG/ML
75 INJECTION, SOLUTION INTRAVENOUS CONTINUOUS
Status: DISPENSED | OUTPATIENT
Start: 2019-06-19 | End: 2019-06-20

## 2019-06-19 RX ORDER — ONDANSETRON 2 MG/ML
4 INJECTION INTRAMUSCULAR; INTRAVENOUS EVERY 6 HOURS PRN
Status: DISCONTINUED | OUTPATIENT
Start: 2019-06-19 | End: 2019-06-21 | Stop reason: HOSPADM

## 2019-06-19 RX ORDER — ATROPINE SULFATE 1 MG/ML
.5-1 INJECTION, SOLUTION INTRAMUSCULAR; INTRAVENOUS; SUBCUTANEOUS
Status: DISCONTINUED | OUTPATIENT
Start: 2019-06-19 | End: 2019-06-21 | Stop reason: HOSPADM

## 2019-06-19 RX ORDER — DIPHENHYDRAMINE HCL 25 MG
25 TABLET ORAL EVERY 6 HOURS PRN
Status: DISCONTINUED | OUTPATIENT
Start: 2019-06-19 | End: 2019-06-21 | Stop reason: HOSPADM

## 2019-06-19 RX ORDER — POTASSIUM CHLORIDE 20 MEQ/1
40 TABLET, EXTENDED RELEASE ORAL ONCE
Status: COMPLETED | OUTPATIENT
Start: 2019-06-19 | End: 2019-06-19

## 2019-06-19 RX ORDER — MIDAZOLAM HYDROCHLORIDE 1 MG/ML
INJECTION INTRAMUSCULAR; INTRAVENOUS AS NEEDED
Status: DISCONTINUED | OUTPATIENT
Start: 2019-06-19 | End: 2019-06-19 | Stop reason: HOSPADM

## 2019-06-19 RX ORDER — SODIUM CHLORIDE 9 MG/ML
250 INJECTION, SOLUTION INTRAVENOUS ONCE AS NEEDED
Status: DISCONTINUED | OUTPATIENT
Start: 2019-06-19 | End: 2019-06-21 | Stop reason: HOSPADM

## 2019-06-19 RX ORDER — LIDOCAINE HYDROCHLORIDE 20 MG/ML
INJECTION, SOLUTION INFILTRATION; PERINEURAL AS NEEDED
Status: DISCONTINUED | OUTPATIENT
Start: 2019-06-19 | End: 2019-06-19 | Stop reason: HOSPADM

## 2019-06-19 RX ORDER — ONDANSETRON 4 MG/1
4 TABLET, FILM COATED ORAL EVERY 6 HOURS PRN
Status: DISCONTINUED | OUTPATIENT
Start: 2019-06-19 | End: 2019-06-21 | Stop reason: HOSPADM

## 2019-06-19 RX ORDER — FENTANYL CITRATE 50 UG/ML
INJECTION, SOLUTION INTRAMUSCULAR; INTRAVENOUS AS NEEDED
Status: DISCONTINUED | OUTPATIENT
Start: 2019-06-19 | End: 2019-06-19 | Stop reason: HOSPADM

## 2019-06-19 RX ADMIN — PREGABALIN 100 MG: 100 CAPSULE ORAL at 20:18

## 2019-06-19 RX ADMIN — SULFASALAZINE 1000 MG: 500 TABLET ORAL at 20:17

## 2019-06-19 RX ADMIN — ATORVASTATIN CALCIUM 20 MG: 20 TABLET, FILM COATED ORAL at 20:18

## 2019-06-19 RX ADMIN — SODIUM CHLORIDE 75 ML/HR: 900 INJECTION, SOLUTION INTRAVENOUS at 20:32

## 2019-06-19 RX ADMIN — ASPIRIN 81 MG 81 MG: 81 TABLET ORAL at 20:18

## 2019-06-19 RX ADMIN — MELATONIN 500 UNITS: at 12:43

## 2019-06-19 RX ADMIN — LEVETIRACETAM 500 MG: 500 TABLET, FILM COATED ORAL at 20:17

## 2019-06-19 RX ADMIN — PANTOPRAZOLE SODIUM 40 MG: 40 TABLET, DELAYED RELEASE ORAL at 06:25

## 2019-06-19 RX ADMIN — PREGABALIN 100 MG: 100 CAPSULE ORAL at 12:33

## 2019-06-19 RX ADMIN — LEVOTHYROXINE SODIUM 25 MCG: 25 TABLET ORAL at 06:25

## 2019-06-19 RX ADMIN — POTASSIUM CHLORIDE 40 MEQ: 1500 TABLET, EXTENDED RELEASE ORAL at 07:48

## 2019-06-19 RX ADMIN — NITROGLYCERIN 10 MCG/MIN: 10 INJECTION INTRAVENOUS at 12:44

## 2019-06-19 RX ADMIN — CLOPIDOGREL BISULFATE 75 MG: 75 TABLET ORAL at 07:42

## 2019-06-19 RX ADMIN — Medication 3 ML: at 20:19

## 2019-06-19 RX ADMIN — SULFASALAZINE 1000 MG: 500 TABLET ORAL at 12:42

## 2019-06-19 RX ADMIN — IOPAMIDOL 100 ML: 755 INJECTION, SOLUTION INTRAVENOUS at 19:00

## 2019-06-19 RX ADMIN — HYDROCHLOROTHIAZIDE 12.5 MG: 12.5 TABLET ORAL at 07:48

## 2019-06-19 RX ADMIN — LEVETIRACETAM 500 MG: 500 TABLET, FILM COATED ORAL at 12:32

## 2019-06-19 NOTE — PLAN OF CARE
Problem: Patient Care Overview  Goal: Plan of Care Review  Outcome: Ongoing (interventions implemented as appropriate)    Goal: Discharge Needs Assessment  Outcome: Ongoing (interventions implemented as appropriate)    Goal: Interprofessional Rounds/Family Conf  Outcome: Ongoing (interventions implemented as appropriate)      Problem: Cardiac: ACS (Acute Coronary Syndrome) (Adult)  Goal: Signs and Symptoms of Listed Potential Problems Will be Absent, Minimized or Managed (Cardiac: ACS)  Outcome: Ongoing (interventions implemented as appropriate)      Problem: Fall Risk (Adult)  Goal: Absence of Fall  Outcome: Ongoing (interventions implemented as appropriate)      Problem: Syncope (Adult)  Goal: Identify Related Risk Factors and Signs and Symptoms  Outcome: Ongoing (interventions implemented as appropriate)    Goal: Physical Safety/Health Maintenance  Outcome: Ongoing (interventions implemented as appropriate)    Goal: Optimal Emotional/Functional [place]  Outcome: Ongoing (interventions implemented as appropriate)      Problem: Breathing Pattern Ineffective (Adult)  Goal: Anxiety/Fear Reduction  Outcome: Ongoing (interventions implemented as appropriate)   06/19/19 1950   Breathing Pattern Ineffective (Adult)   Anxiety/Fear Reduction making progress toward outcome

## 2019-06-19 NOTE — NURSING NOTE
"Pt returned to unit from [facility]. Pt alert and oriented x3, appears drowsy at times. Pt states, \"I just feel like I want to go back to sleep.\"  "

## 2019-06-19 NOTE — PROCEDURES
CARDIAC CATHETERIZATION REPORT     DATE OF PROCEDURE: 6/19/2019    INDICATION FOR PROCEDURE  Unstable angina  Status post CABG  Significant bradycardia  PVCs  PROCEDURE PERFORMED: Left heart catheterization, coronary angiography, left ventriculography.  SVG and LIMA      PROCEDURE COMMENTS:     Under usual sterile precautions and 1% Xylocaine infiltration right femoral artery was percutaneously punctured and a 5 [place] vascular sheath was introduced in the right femoral artery.  Left and right coronary angiography was performed in varying degrees of obliquity.  Saphenous vein grafts and left internal mammary artery were evaluated followed by left ankle regimen.  Hemostasis was obtained and patient was returned to the room with intact pulses.  No complications were noted.  Patient tolerated the procedure well.    FINDINGS:    1. HEMODYNAMICS: Normal    2. LEFT VENTRICULOGRAPHY: Left ventricular proximal inferior hypokinesis with ejection fraction of 55%.    3. CORONARY ANGIOGRAPHY:     Cardiac catheterization and coronary angiography 6/19/2019 revealed  Left ventricle size and contractility is normal with proximal inferior wall hypokinesis.  Ejection fraction is 55%.     Left main artery is normal.  Left anterior descending artery is noted in the midsegment with filling of the LAD through LIMA.  Circumflex coronary artery is normal  Right coronary artery is a very small caliber vessel with 99% disease in the midsegment with filling of the distal vessel through collaterals.     RAUSCH to LAD is patent  SVG to marginal branch is patent  SVG to RCA is totally occluded.Discontinue intravenous nitroglycerin    Plan  Medical treatment  Close observation of ventricular dysrhythmia  If the patient has continued problems with dizziness and possible syncope consideration will be given for loop recorder placement.

## 2019-06-19 NOTE — TELEPHONE ENCOUNTER
Phone Note   Outgoing Call  Summary of Call: New bone density showed osteopenia with high risk fracture.  Please recommended the patient below today and disease she still has her own teeth.  Please send her information regarding Fosamax and asked her to let her dentist note that we will be starting her on this medication but, 1st please find out if the patient has history of problems with swallowing, esophageal stricture or hiatal hernia.  We will discuss in further detail in follow-up visit.  Call placed by: Ciera Garcia MD,  June 12, 2019 9:05 PM    Follow-up for Phone Call   Follow-up Details: I called and spoke with the patient and she stated that she is having dental work on her bottom teeth next month (JULY) and that she does have some esophageal stricture and a prior Hernia 2 years ago but still has some issues with it. Please advise  Follow-up by: Sonali Michael CMA,  June 14, 2019 1:11 PM

## 2019-06-19 NOTE — PROGRESS NOTES
Referring Provider: Bethany Gipson MD    Reason for follow-up:  Unstable angina  Premature ventricular contractions  Status post CABG  Dizziness and syncope     Patient Care Team:  Keith Alston MD as PCP - General    Subjective .  Feeling okay     ROS   Since I have last seen, the patient has been without any chest discomfort ,shortness of breath, palpitations, or syncope.  Denies having any headache ,abdominal pain ,nausea, vomiting , diarrhea constipation, loss of weight or loss of appetite.  Denies having any excessive bruising ,hematuria or blood in the stool.  Review of all systems negative except as indicated    History  Past Medical History:   Diagnosis Date   • Arthritis    • Atrial fibrillation (CMS/HCC)    • Bronchitis    • Chickenpox    • COPD (chronic obstructive pulmonary disease) (CMS/HCC)    • Cramps of lower extremity     bilateral with weakness   • Heart disease    • Hepatitis C 2013    treatment x 11 months   • High cholesterol    • History of degenerative disc disease    • HLD (hyperlipidemia)    • HTN (hypertension)    • Hypothyroidism    • Measles    • Seizure disorder (CMS/HCC)    • Skin melanoma (CMS/HCC) 2013    s/p lymph node removal on the right   • Sleep apnea    • Stroke (CMS/HCC)    • Whooping cough        Past Surgical History:   Procedure Laterality Date   • APPENDECTOMY     • AXILLARY NODE DISSECTION Right 05/2014   • CATARACT EXTRACTION  01/2017    x 2   • CHOLECYSTECTOMY  2004   • COLONOSCOPY  04/05/2017   • CORONARY ANGIOPLASTY WITH STENT PLACEMENT      stent placement after CABG   • CORONARY ARTERY BYPASS GRAFT  01/20/2010   • CORONARY ARTERY BYPASS GRAFT     • FEMORAL POPLITEAL BYPASS Right 07/20/2017    Dr. Palmer   • INCISIONAL HERNIA REPAIR  09/28/2016    lap. Dr. West   • MOLE REMOVAL Right     arm   • OTHER SURGICAL HISTORY      laser surgery X 2   • PARTIAL HYSTERECTOMY     • THYROIDECTOMY, PARTIAL      goiter removed 1/2 thyroid       Family History    Problem Relation Age of Onset   • Hypertension Mother    • Cancer Mother    • Gout Mother    • Asthma Mother    • Cancer Father    • Heart disease Brother    • Diabetes Other    • Cancer Other    • Tuberculosis Other        Social History     Tobacco Use   • Smoking status: Former Smoker     Last attempt to quit: 6/17/2009     Years since quitting: 10.0   Substance Use Topics   • Alcohol use: No     Frequency: Never   • Drug use: No        Medications Prior to Admission   Medication Sig Dispense Refill Last Dose   • amLODIPine (NORVASC) 5 MG tablet Take 5 mg by mouth Daily.   6/17/2019 at Unknown time   • atorvastatin (LIPITOR) 10 MG tablet Take 20 mg by mouth Every Night.   6/16/2019 at Unknown time   • Cholecalciferol 21232 units tablet Take 2,000 Units by mouth 2 (Two) Times a Day.   6/17/2019 at Unknown time   • clopidogrel (PLAVIX) 75 MG tablet Take 75 mg by mouth Daily.   6/17/2019 at Unknown time   • ezetimibe (ZETIA) 10 MG tablet Take 10 mg by mouth Daily.   6/17/2019 at Unknown time   • hydrochlorothiazide (HYDRODIURIL) 25 MG tablet Take 12.5 mg by mouth Daily.   6/17/2019 at Unknown time   • levETIRAcetam (KEPPRA) 500 MG tablet Take 500 mg by mouth 2 (Two) Times a Day.   6/17/2019 at Unknown time   • levothyroxine (SYNTHROID, LEVOTHROID) 25 MCG tablet Take 25 mcg by mouth Daily.   6/17/2019 at Unknown time   • lisinopril (PRINIVIL,ZESTRIL) 20 MG tablet Take 20 mg by mouth Daily.   6/17/2019 at Unknown time   • metoprolol succinate XL (TOPROL-XL) 100 MG 24 hr tablet Take 100 mg by mouth Daily.   6/17/2019 at Unknown time   • pantoprazole (PROTONIX) 20 MG EC tablet Take 20 mg by mouth Daily.   6/17/2019 at Unknown time   • pregabalin (LYRICA) 100 MG capsule Take 100 mg by mouth 3 (Three) Times a Day.   6/17/2019 at Unknown time   • sulfaSALAzine (AZULFIDINE) 500 MG tablet Take 1,000 mg by mouth 2 (Two) Times a Day.   6/17/2019 at Unknown time   • tiZANidine (ZANAFLEX) 4 MG tablet Take 2 mg by mouth At  "Night As Needed for Muscle Spasms.   6/16/2019 at Unknown time       Allergies  Penicillins    Scheduled Meds:  [MAR Hold] atorvastatin 20 mg Oral Nightly   [MAR Hold] cholecalciferol 500 Units Oral Daily   [MAR Hold] clopidogrel 75 mg Oral Daily   [MAR Hold] hydrochlorothiazide 12.5 mg Oral Daily   levETIRAcetam 500 mg Oral Q12H   [MAR Hold] levothyroxine 25 mcg Oral Q AM   [MAR Hold] pantoprazole 40 mg Oral Q AM   pregabalin 100 mg Oral TID   [MAR Hold] sodium chloride 3 mL Intravenous Q12H   [MAR Hold] sulfaSALAzine 1,000 mg Oral BID     Continuous Infusions:  nitroglycerin 10-50 mcg/min Last Rate: 10 mcg/min (06/19/19 0538)     PRN Meds:.•  [MAR Hold] acetaminophen  •  acetaminophen  •  atropine  •  diphenhydrAMINE  •  [MAR Hold] melatonin  •  [MAR Hold] nitroglycerin  •  [MAR Hold] ondansetron **OR** [MAR Hold] ondansetron  •  ondansetron **OR** ondansetron  •  [COMPLETED] Insert peripheral IV **AND** [MAR Hold] sodium chloride  •  [MAR Hold] sodium chloride  •  sodium chloride  •  [MAR Hold] tiZANidine    Objective     VITAL SIGNS  Vitals:    06/19/19 0930 06/19/19 0945 06/19/19 0950 06/19/19 1000   BP: 146/80 140/71 129/86 124/76   BP Location:       Patient Position:       Pulse: 61 56 55 58   Resp:       Temp:       TempSrc:       SpO2: 99% 99% 100% 100%   Weight:       Height:           Flowsheet Rows      First Filed Value   Admission Height  157.5 cm (62\") Documented at 06/17/2019 1250   Admission Weight  78.5 kg (173 lb 1 oz) Documented at 06/17/2019 1250           TELEMETRY: Sinus rhythm    Physical Exam:  The patient is alert, oriented and in no distress.  Vital signs as noted above.  Head and neck revealed no carotid bruits or jugular venous distention.  No thyromegaly or lymphadenopathy is present  Lungs clear.  No wheezing.  Breath sounds are normal bilaterally.  Heart normal first and second heart sounds.  No murmur. No precordial rub is present.  No gallop is present.  Abdomen soft and nontender. "  No organomegaly is present.  Extremities with good peripheral pulses without any pedal edema.  Skin warm and dry.  Musculoskeletal system is grossly normal  CNS grossly normal      Results Review:   I reviewed the patient's new clinical results.  Lab Results (last 24 hours)     Procedure Component Value Units Date/Time    Basic Metabolic Panel [[id number]]  (Abnormal) Collected:  06/19/19 0113    Specimen:  Blood Updated:  06/19/19 0309     Glucose 114 mg/dL      BUN 9 mg/dL      Creatinine 1.00 mg/dL      Sodium 137 mmol/L      Potassium 3.3 mmol/L      Chloride 106 mmol/L      CO2 23.0 mmol/L      Calcium 8.5 mg/dL      eGFR Non African Amer 55 mL/min/1.73      BUN/Creatinine Ratio 9.0     Anion Gap 8.0 mmol/L     Narrative:       The MDRD GFR formula is only valid for adults with stable renal function between ages 18 and 70.    aPTT [[id number]]  (Normal) Collected:  06/19/19 0113    Specimen:  Blood Updated:  06/19/19 0255     PTT 26.1 seconds     Protime-INR [[id number]]  (Normal) Collected:  06/19/19 0113    Specimen:  Blood Updated:  06/19/19 0255     Protime 10.8 Seconds      INR 1.06    CBC & Differential [[id number]] Collected:  06/19/19 0113    Specimen:  Blood Updated:  06/19/19 0225    Narrative:       The following orders were created for panel order CBC & Differential.  Procedure                               Abnormality         Status                     ---------                               -----------         ------                     CBC Auto Differential[[id number]]        Normal              Final result                 Please view results for these tests on the individual orders.    CBC Auto Differential [[id number]]  (Normal) Collected:  06/19/19 0113    Specimen:  Blood Updated:  06/19/19 0225     WBC 5.40 10*3/mm3      RBC 4.42 10*6/mm3      Hemoglobin 14.1 g/dL      Hematocrit 42.7 %      MCV 96.6 fL      MCH 31.8 pg      MCHC 33.0 g/dL      RDW 12.6 %      RDW-SD 42.9 fl      MPV 10.2 fL       Platelets 142 10*3/mm3      Neutrophil % 44.1 %      Lymphocyte % 42.6 %      Monocyte % 11.1 %      Eosinophil % 1.6 %      Basophil % 0.6 %      Neutrophils, Absolute 2.40 10*3/mm3      Lymphocytes, Absolute 2.30 10*3/mm3      Monocytes, Absolute 0.60 10*3/mm3      Eosinophils, Absolute 0.10 10*3/mm3      Basophils, Absolute 0.00 10*3/mm3      nRBC 0.1 /100 WBC     Protime-INR [[id number]]  (Normal) Collected:  06/18/19 1838    Specimen:  Blood Updated:  06/18/19 1907     Protime 11.1 Seconds      INR 1.09    Magnesium [[id number]]  (Normal) Collected:  06/18/19 0551    Specimen:  Blood Updated:  06/18/19 1133     Magnesium 1.9 mg/dL      Comment: Specimen hemolyzed.  Results may be affected.       Basic Metabolic Panel [[id number]]  (Abnormal) Collected:  06/18/19 0551    Specimen:  Blood Updated:  06/18/19 1132     Glucose 101 mg/dL      BUN 14 mg/dL      Creatinine 1.20 mg/dL      Sodium 141 mmol/L      Potassium 4.4 mmol/L      Comment: Specimen hemolyzed.  Results may be affected.        Chloride 112 mmol/L      CO2 15.0 mmol/L      Calcium 8.7 mg/dL      eGFR Non African Amer 44 mL/min/1.73      BUN/Creatinine Ratio 11.7     Anion Gap 14.0 mmol/L     Narrative:       The MDRD GFR formula is only valid for adults with stable renal function between ages 18 and 70.          Imaging Results (last 24 hours)     ** No results found for the last 24 hours. **          EKG      I personally viewed and interpreted the patient's EKG/Telemetry data:    ECHOCARDIOGRAM:             STRESS MYOVIEW:    Cardiolite (Tc-99m Sestamibi) stress test    CARDIAC CATHETERIZATION:    PROCEDURES; CARDIAC CATHETERIZATION:19961        OTHER: /////////////////  impression  ---------------------------  -Chest discomfort suggestive of unstable angina factors.  EKG showed no acute changes except PVCs.    Troponin levels are negative     -Status post CABG 01/20/2010 at [facility].  Status post stent placement after CABG ( details not  available)     Echocardiogram 11/13/2017 revealed mild mitral regurgitation and normal left ventricular function.  Carri scan Cardiolite test is negative for myocardial ischemia 11/13/2017     -Intermittent atrial fibrillation.  Patient is maintaining sinus rhythm.       -History of  stroke from which patient has recovered.      -Hypertension dyslipidemia and hypothyroidism      -Status post thyroidectomy for cyst cholecystectomy and hysterectomy.      -History of hepatitis-C      -Allergy to penicillin      -Former smoker quit smoking October 2013.      -Status post  Malignant melanoma removal from right elbow area.  Patient did not need chemotherapy     -history of peripheral vascular disease.  CTA 06/30/2017 revealed right superficial femoral artery occlusion  ---------------.  Plan  ----------------------  EKG showed sinus rhythm premature ventricle contraction nonspecific ST- T changes normal axis  Patient having symptoms consistent with unstable angina  Continue with intravenous nitroglycerin.  Patient to have a cardiac 6/19/2019 arteriography today.    Risk benefits pros and cons of the procedure were discussed with patient.  Hypokalemia-new problem-3.3-supplements.  Patient is on Plavix.  Further management on patient's progress.    ]]]]]]]]]]]]]]]]]]  Cardiac catheterization and coronary angiography 6/19/2019 revealed  Left ventricle size and contractility is normal with proximal inferior wall hypokinesis.  Ejection fraction is 55%.    Left main artery is normal.  Left anterior descending artery is noted in the midsegment with filling of the LAD through LIMA.  Circumflex coronary artery is normal  Right coronary artery is a very small caliber vessel with 99% disease in the midsegment with filling of the distal vessel through collaterals.    RASUCH to LAD is patent  SVG to marginal branch is patent  SVG to RCA is totally occluded.    Plan  Discontinue intravenous nitroglycerin  Medical treatment  Close  observation of ventricular dysrhythmia  If the patient has continued problems with dizziness and possible syncope consideration will be given for loop recorder placement.  ]]]]]]]]]]]]]]]                Assessment/Plan          Active Problems:    Ventricular bigeminy    Bradycardia with 51-60 beats per minute    Chest pain in adult              Real Winn MD  06/19/19  10:14 AM

## 2019-06-19 NOTE — H&P
This is Neurology Consult note.      Chief Compliant:    Evaluate  For acute stroke.  Code  Stroke.     HPI:  The patient is a 73 year old lady with multiple medical problems including  CAD, s/p CABGx4, PVD, s/p right fem-pop bypass, hypothyroidism, seizure  Disorder who was admitted at [facility]  Secondary to dizziness and being lightheaded.  Her blood pressure was also low.  She was admitted and  Underwent cardiac cath this morning. This evening the patient developed nystagmus, double vision and numbness of the left side of body.  Her last normal was 7:30 am today.  An emergent CT of Head and  CTA of Head and Neck was done which did not showed any cut off. The patient denies speech and swallowing problems and focal weakness.      ROS: Constitutional: no weakness. Shortness of air +, CP  +, HLD+, skin melanoma+, stroke+.    PMH:  Past Medical History:   Diagnosis Date   • Arthritis    • Atrial fibrillation (CMS/HCC)    • Bronchitis    • Chickenpox    • COPD (chronic obstructive pulmonary disease) (CMS/HCC)    • Cramps of lower extremity     bilateral with weakness   • Heart disease    • Hepatitis C 2013    treatment x 11 months   • High cholesterol    • History of degenerative disc disease    • HLD (hyperlipidemia)    • HTN (hypertension)    • Hypothyroidism    • Measles    • Seizure disorder (CMS/HCC)    • Skin melanoma (CMS/HCC) 2013    s/p lymph node removal on the right   • Sleep apnea    • Stroke (CMS/HCC)    • Whooping cough      Social History     Socioeconomic History   • Marital status:      Spouse name: Not on file   • Number of children: Not on file   • Years of education: Not on file   • Highest education level: Not on file   Tobacco Use   • Smoking status: Former Smoker     Last attempt to quit: 6/17/2009     Years since quitting: 10.0   Substance and Sexual Activity   • Alcohol use: No     Frequency: Never   • Drug use: No     Past Surgical History:   Procedure Laterality Date   • APPENDECTOMY     •  AXILLARY NODE DISSECTION Right 05/2014   • CATARACT EXTRACTION  01/2017    x 2   • CHOLECYSTECTOMY  2004   • COLONOSCOPY  04/05/2017   • CORONARY ANGIOPLASTY WITH STENT PLACEMENT      stent placement after CABG   • CORONARY ARTERY BYPASS GRAFT  01/20/2010   • CORONARY ARTERY BYPASS GRAFT     • FEMORAL POPLITEAL BYPASS Right 07/20/2017    Dr. Palmer   • INCISIONAL HERNIA REPAIR  09/28/2016    lap. Dr. West   • MOLE REMOVAL Right     arm   • OTHER SURGICAL HISTORY      laser surgery X 2   • PARTIAL HYSTERECTOMY     • THYROIDECTOMY, PARTIAL      goiter removed 1/2 thyroid     Family History   Problem Relation Age of Onset   • Hypertension Mother    • Cancer Mother    • Gout Mother    • Asthma Mother    • Cancer Father    • Heart disease Brother    • Diabetes Other    • Cancer Other    • Tuberculosis Other        Labs:  Lab Results (last 24 hours)     Procedure Component Value Units Date/Time    Extra Tubes [[id number]] Collected:  06/19/19 1729    Specimen:  Blood, Venous Line Updated:  06/19/19 1831    Narrative:       The following orders were created for panel order Extra Tubes.  Procedure                               Abnormality         Status                     ---------                               -----------         ------                     Gold Top - SST[[id number]]                                   Final result               Gold Top - SST[[id number]]                                   Final result                 Please view results for these tests on the individual orders.    Gold Top - SST [[id number]] Collected:  06/19/19 1729    Specimen:  Blood Updated:  06/19/19 1831     Extra Tube Hold for add-ons.     Comment: Auto resulted.       Gold Top - SST [[id number]] Collected:  06/19/19 1729    Specimen:  Blood Updated:  06/19/19 1831     Extra Tube Hold for add-ons.     Comment: Auto resulted.       Basic Metabolic Panel [[id number]]  (Abnormal) Collected:  06/19/19 1729    Specimen:  Blood Updated:   06/19/19 1814     Glucose 113 mg/dL      BUN 7 mg/dL      Creatinine 0.90 mg/dL      Sodium 139 mmol/L      Potassium 3.6 mmol/L      Chloride 107 mmol/L      CO2 23.0 mmol/L      Calcium 9.0 mg/dL      eGFR Non African Amer 62 mL/min/1.73      BUN/Creatinine Ratio 7.8     Anion Gap 9.0 mmol/L     Narrative:       The MDRD GFR formula is only valid for adults with stable renal function between ages 18 and 70.    aPTT [[id number]]  (Normal) Collected:  06/19/19 1739    Specimen:  Blood Updated:  06/19/19 1806     PTT 25.9 seconds     Protime-INR [[id number]]  (Normal) Collected:  06/19/19 1739    Specimen:  Blood Updated:  06/19/19 1806     Protime 10.6 Seconds      INR 1.04    CBC & Differential [[id number]] Collected:  06/19/19 1739    Specimen:  Blood Updated:  06/19/19 1759    Narrative:       The following orders were created for panel order CBC & Differential.  Procedure                               Abnormality         Status                     ---------                               -----------         ------                     CBC Auto Differential[[id number]]        Abnormal            Final result                 Please view results for these tests on the individual orders.    CBC Auto Differential [[id number]]  (Abnormal) Collected:  06/19/19 1739    Specimen:  Blood Updated:  06/19/19 1759     WBC 6.80 10*3/mm3      RBC 4.88 10*6/mm3      Hemoglobin 15.5 g/dL      Hematocrit 46.1 %      MCV 94.4 fL      MCH 31.7 pg      MCHC 33.6 g/dL      RDW 12.8 %      RDW-SD 42.0 fl      MPV 9.9 fL      Platelets 159 10*3/mm3      Neutrophil % 56.7 %      Lymphocyte % 31.6 %      Monocyte % 9.8 %      Eosinophil % 1.5 %      Basophil % 0.4 %      Neutrophils, Absolute 3.90 10*3/mm3      Lymphocytes, Absolute 2.20 10*3/mm3      Monocytes, Absolute 0.70 10*3/mm3      Eosinophils, Absolute 0.10 10*3/mm3      Basophils, Absolute 0.00 10*3/mm3      nRBC 0.3 /100 WBC     POC Glucose Once [[id number]]  (Normal) Collected:   06/19/19 1733    Specimen:  Blood Updated:  06/19/19 1734     Glucose 100 mg/dL      Comment: Serial Number: [id number]:  [id number]       Basic Metabolic Panel [[id number]]  (Abnormal) Collected:  06/19/19 0113    Specimen:  Blood Updated:  06/19/19 0309     Glucose 114 mg/dL      BUN 9 mg/dL      Creatinine 1.00 mg/dL      Sodium 137 mmol/L      Potassium 3.3 mmol/L      Chloride 106 mmol/L      CO2 23.0 mmol/L      Calcium 8.5 mg/dL      eGFR Non African Amer 55 mL/min/1.73      BUN/Creatinine Ratio 9.0     Anion Gap 8.0 mmol/L     Narrative:       The MDRD GFR formula is only valid for adults with stable renal function between ages 18 and 70.    aPTT [[id number]]  (Normal) Collected:  06/19/19 0113    Specimen:  Blood Updated:  06/19/19 0255     PTT 26.1 seconds     Protime-INR [[id number]]  (Normal) Collected:  06/19/19 0113    Specimen:  Blood Updated:  06/19/19 0255     Protime 10.8 Seconds      INR 1.06    CBC & Differential [[id number]] Collected:  06/19/19 0113    Specimen:  Blood Updated:  06/19/19 0225    Narrative:       The following orders were created for panel order CBC & Differential.  Procedure                               Abnormality         Status                     ---------                               -----------         ------                     CBC Auto Differential[[id number]]        Normal              Final result                 Please view results for these tests on the individual orders.    CBC Auto Differential [[id number]]  (Normal) Collected:  06/19/19 0113    Specimen:  Blood Updated:  06/19/19 0225     WBC 5.40 10*3/mm3      RBC 4.42 10*6/mm3      Hemoglobin 14.1 g/dL      Hematocrit 42.7 %      MCV 96.6 fL      MCH 31.8 pg      MCHC 33.0 g/dL      RDW 12.6 %      RDW-SD 42.9 fl      MPV 10.2 fL      Platelets 142 10*3/mm3      Neutrophil % 44.1 %      Lymphocyte % 42.6 %      Monocyte % 11.1 %      Eosinophil % 1.6 %      Basophil % 0.6 %      Neutrophils, Absolute 2.40  10*3/mm3      Lymphocytes, Absolute 2.30 10*3/mm3      Monocytes, Absolute 0.60 10*3/mm3      Eosinophils, Absolute 0.10 10*3/mm3      Basophils, Absolute 0.00 10*3/mm3      nRBC 0.1 /100 WBC     Protime-INR [[id number]]  (Normal) Collected:  06/18/19 1838    Specimen:  Blood Updated:  06/18/19 1907     Protime 11.1 Seconds      INR 1.09            Medications:    Current Facility-Administered Medications:   •  acetaminophen (TYLENOL) tablet 650 mg, 650 mg, Oral, Q4H PRN, Lorri Hurt APRN  •  acetaminophen (TYLENOL) tablet 650 mg, 650 mg, Oral, Q4H PRN, Real Winn MD  •  aspirin chewable tablet 81 mg, 81 mg, Oral, Daily, Oscar Parks MD  •  atorvastatin (LIPITOR) tablet 20 mg, 20 mg, Oral, Nightly, Lorri Hurt APRN, 20 mg at 06/18/19 2033  •  atropine injection 0.5-1 mg, 0.5-1 mg, Intravenous, Q5 Min PRN, Real Winn MD  •  cholecalciferol (VITAMIN D3) tablet 500 Units, 500 Units, Oral, Daily, Lorri Hurt APRN, 500 Units at 06/19/19 1243  •  clopidogrel (PLAVIX) tablet 75 mg, 75 mg, Oral, Daily, Lorri Hurt APRN, 75 mg at 06/19/19 0742  •  diphenhydrAMINE (BENADRYL) tablet 25 mg, 25 mg, Oral, Q6H PRN, Real Winn MD  •  hydrochlorothiazide (HYDRODIURIL) tablet 12.5 mg, 12.5 mg, Oral, Daily, Lorri Hurt APRN, 12.5 mg at 06/19/19 0748  •  levETIRAcetam (KEPPRA) tablet 500 mg, 500 mg, Oral, Q12H, Lorri Hurt APRN, 500 mg at 06/19/19 1232  •  levothyroxine (SYNTHROID, LEVOTHROID) tablet 25 mcg, 25 mcg, Oral, Q AM, Lorri Hurt APRN, 25 mcg at 06/19/19 0625  •  melatonin tablet 5 mg, 5 mg, Oral, Nightly PRN, Lorri Hurt APRN  •  nitroglycerin (NITROSTAT) SL tablet 0.4 mg, 0.4 mg, Sublingual, Q5 Min PRN, Lorri Hurt APRN, 0.4 mg at 06/18/19 1542  •  nitroglycerin infusion 100 mcg/mL, 10-50 mcg/min, Intravenous, Titrated, Sid Taylor MD, Last Rate: 6 mL/hr at 06/19/19 1645, 10 mcg/min at 06/19/19 1645  •   ondansetron (ZOFRAN) tablet 4 mg, 4 mg, Oral, Q6H PRN **OR** ondansetron (ZOFRAN) injection 4 mg, 4 mg, Intravenous, Q6H PRN, Britney Hurtle, APRN, 4 mg at 06/18/19 1359  •  ondansetron (ZOFRAN) tablet 4 mg, 4 mg, Oral, Q6H PRN **OR** ondansetron (ZOFRAN) injection 4 mg, 4 mg, Intravenous, Q6H PRN, Real Winn MD  •  pantoprazole (PROTONIX) EC tablet 40 mg, 40 mg, Oral, Q AM, Achterkacey, Lorri, APRN, 40 mg at 06/19/19 0625  •  pregabalin (LYRICA) capsule 100 mg, 100 mg, Oral, TID, Achterkacey, Lorri, APRN, 100 mg at 06/19/19 1233  •  [COMPLETED] Insert peripheral IV, , , Once **AND** sodium chloride 0.9 % flush 10 mL, 10 mL, Intravenous, PRN, Sid Taylor MD  •  sodium chloride 0.9 % flush 3 mL, 3 mL, Intravenous, Q12H, Achterkacey, Lorri, APRN, 3 mL at 06/18/19 2034  •  sodium chloride 0.9 % flush 3-10 mL, 3-10 mL, Intravenous, PRN, Achterkacey, Lorri, APRN  •  sodium chloride 0.9 % infusion 250 mL, 250 mL, Intravenous, Once PRN, Real Winn MD  •  sodium chloride 0.9 % infusion, 75 mL/hr, Intravenous, Continuous, Oscar Parks MD  •  sulfaSALAzine (AZULFIDINE) tablet 1,000 mg, 1,000 mg, Oral, BID, Achterkacey, Lorri, APRN, 1,000 mg at 06/19/19 1242  •  tiZANidine (ZANAFLEX) tablet 2 mg, 2 mg, Oral, Nightly PRN, Achterberg, Lorri, APRN    Facility-Administered Medications Ordered in Other Encounters:   •  [COMPLETED] iopamidol (ISOVUE-370) 76 % injection 100 mL, 100 mL, Intravenous, Once in imaging, Bethany Gipson MD, 100 mL at 06/19/19 1900    Vitals:  Vitals:    06/19/19 1835   BP:    Pulse: (!) 47   Resp:    Temp:    SpO2: 95%       Physical Exam:  The patient is well developed lady lying in bed in no apparent distress.  Head NC, AT, Neck supple. Lungs CTA.  Heart S1-S2. Abdomen soft.  Ext no edema.     Neurologic Exam: The patient is awake, alert, resting in bed in no apparent distress.  Speech is good, follow commands.  CN Partial right medial rectus palsy. PERRL, no  facial droop.  Decrease to light touch noted on left side.  Motor 5/5. Sensory decrease to light touch noted on left side.. Reflexes absent, plantar mute. Cerebellum showed mild dysmetria.  Gait is deferred secondary to patient's condition.      Impression: The patient is a 73 year old lady with multiple medical problems who underwent cardiac cath today.  Her last normal was 7:30 am.  A code stroke was called at 5:35 pm.  The patient noted to have right partial  Medial rectus palsy as well as diplopia and left sided numbness.  The  Clinical features are consistent with brainstem stroke.  She is not a candidate for tPA since she is out of window period.  There is no blockage of large vessels.      Recomendations:  Will continue  Plavix and start ASA.  Will obtain a MRI of Brain w/o contrast.  Blood work up, SCD.  Stroke education provided. Consider 2-D Echocardiogram.  Will follow.  Thanks.

## 2019-06-19 NOTE — NURSING NOTE
Pt stating left sided chest pain traveling to left back. Dr. Winn called and received orders to restart IV nitro. WCTM.

## 2019-06-19 NOTE — TELEPHONE ENCOUNTER
Spoke with a Malaika George. She advised Delphine is currently in the hospital and they are going to be changing her meds around to get her HR and other issues taken care of first. She took the information down about the Fosamax and will discuss with Delphine's medical team once they get he meds changed as well as discussing it with the dentist. Malaika advised they will call back in July to see about starting this but don't feel like they should start it right now since she is not in good health and is in the hospital.

## 2019-06-19 NOTE — PLAN OF CARE
Problem: Patient Care Overview  Goal: Plan of Care Review  Outcome: Ongoing (interventions implemented as appropriate)   06/19/19 0522   Coping/Psychosocial   Plan of Care Reviewed With patient   OTHER   Outcome Summary Patient is still complaining of slight chest pain and syncope. Patient had a good night overall.        Problem: Cardiac: ACS (Acute Coronary Syndrome) (Adult)  Goal: Signs and Symptoms of Listed Potential Problems Will be Absent, Minimized or Managed (Cardiac: ACS)  Outcome: Ongoing (interventions implemented as appropriate)      Problem: Fall Risk (Adult)  Goal: Identify Related Risk Factors and Signs and Symptoms  Outcome: Outcome(s) achieved Date Met: 06/19/19    Goal: Absence of Fall  Outcome: Ongoing (interventions implemented as appropriate)      Problem: Syncope (Adult)  Goal: Identify Related Risk Factors and Signs and Symptoms  Outcome: Ongoing (interventions implemented as appropriate)    Goal: Physical Safety/Health Maintenance  Outcome: Ongoing (interventions implemented as appropriate)    Goal: Optimal Emotional/Functional [place]  Outcome: Ongoing (interventions implemented as appropriate)      Problem: Breathing Pattern Ineffective (Adult)  Goal: Identify Related Risk Factors and Signs and Symptoms  Outcome: Outcome(s) achieved Date Met: 06/19/19    Goal: Effective Oxygenation/Ventilation  Outcome: Outcome(s) achieved Date Met: 06/19/19    Goal: Anxiety/Fear Reduction  Outcome: Ongoing (interventions implemented as appropriate)

## 2019-06-19 NOTE — NURSING NOTE
Code stroke called. Abnormal eye movement noted on pt left eye on assessment. Dr. Gipson notified.

## 2019-06-20 ENCOUNTER — HOSPITAL ENCOUNTER (OUTPATIENT)
Dept: MRI IMAGING | Facility: HOSPITAL | Age: 74
Setting detail: OBSERVATION
Discharge: HOME | End: 2019-06-20

## 2019-06-20 LAB
ANION GAP SERPL CALCULATED.3IONS-SCNC: 8 MMOL/L (ref 10–20)
ARTICHOKE IGE QN: 61 MG/DL (ref 0–100)
BASOPHILS # BLD AUTO: 0.1 10*3/MM3 (ref 0–0.2)
BASOPHILS NFR BLD AUTO: 0.9 % (ref 0–1.5)
BUN BLD-MCNC: 8 MG/DL (ref 8–20)
BUN/CREAT SERPL: 8.9 (ref 5.4–26.2)
CALCIUM SPEC-SCNC: 8.8 MG/DL (ref 8.9–10.3)
CHLORIDE SERPL-SCNC: 107 MMOL/L (ref 101–111)
CHOLEST SERPL-MCNC: 106 MG/DL
CO2 SERPL-SCNC: 25 MMOL/L (ref 22–32)
CREAT BLD-MCNC: 0.9 MG/DL (ref 0.4–1)
DEPRECATED RDW RBC AUTO: 41.1 FL (ref 37–54)
EOSINOPHIL # BLD AUTO: 0.1 10*3/MM3 (ref 0–0.4)
EOSINOPHIL NFR BLD AUTO: 1.8 % (ref 0.3–6.2)
ERYTHROCYTE [DISTWIDTH] IN BLOOD BY AUTOMATED COUNT: 12.4 % (ref 12.3–15.4)
GFR SERPL CREATININE-BSD FRML MDRD: 62 ML/MIN/1.73
GLUCOSE BLD-MCNC: 102 MG/DL (ref 65–99)
HBA1C MFR BLD: 5.3 % (ref 3.5–5.6)
HCT VFR BLD AUTO: 42.7 % (ref 34–46.6)
HDLC SERPL QL: 3.53
HDLC SERPL-MCNC: 30 MG/DL
HGB BLD-MCNC: 14.3 G/DL (ref 12–15.9)
LDLC/HDLC SERPL: 1.67 {RATIO}
LYMPHOCYTES # BLD AUTO: 2.1 10*3/MM3 (ref 0.7–3.1)
LYMPHOCYTES NFR BLD AUTO: 35.1 % (ref 19.6–45.3)
MCH RBC QN AUTO: 31.5 PG (ref 26.6–33)
MCHC RBC AUTO-ENTMCNC: 33.4 G/DL (ref 31.5–35.7)
MCV RBC AUTO: 94.3 FL (ref 79–97)
MONOCYTES # BLD AUTO: 0.5 10*3/MM3 (ref 0.1–0.9)
MONOCYTES NFR BLD AUTO: 8.1 % (ref 5–12)
NEUTROPHILS # BLD AUTO: 3.2 10*3/MM3 (ref 1.7–7)
NEUTROPHILS NFR BLD AUTO: 54.1 % (ref 42.7–76)
NRBC BLD AUTO-RTO: 0.1 /100 WBC (ref 0–0.2)
PLATELET # BLD AUTO: 151 10*3/MM3 (ref 140–450)
PMV BLD AUTO: 10.1 FL (ref 6–12)
POTASSIUM BLD-SCNC: 4.3 MMOL/L (ref 3.6–5.1)
RBC # BLD AUTO: 4.53 10*6/MM3 (ref 3.77–5.28)
SODIUM BLD-SCNC: 140 MMOL/L (ref 136–144)
TRIGL SERPL-MCNC: 129 MG/DL
TSH SERPL DL<=0.05 MIU/L-ACNC: 4.24 MIU/ML (ref 0.34–5.6)
VIT B12 BLD-MCNC: 252 PG/ML (ref 180–914)
VLDLC SERPL-MCNC: 25.8 MG/DL
WBC NRBC COR # BLD: 6 10*3/MM3 (ref 3.4–10.8)

## 2019-06-20 PROCEDURE — 80048 BASIC METABOLIC PNL TOTAL CA: CPT | Performed by: INTERNAL MEDICINE

## 2019-06-20 PROCEDURE — 82607 VITAMIN B-12: CPT | Performed by: PSYCHIATRY & NEUROLOGY

## 2019-06-20 PROCEDURE — 25010000002 CYANOCOBALAMIN PER 1000 MCG: Performed by: PSYCHIATRY & NEUROLOGY

## 2019-06-20 PROCEDURE — 84443 ASSAY THYROID STIM HORMONE: CPT | Performed by: PSYCHIATRY & NEUROLOGY

## 2019-06-20 PROCEDURE — 99233 SBSQ HOSP IP/OBS HIGH 50: CPT | Performed by: INTERNAL MEDICINE

## 2019-06-20 PROCEDURE — 80061 LIPID PANEL: CPT | Performed by: PSYCHIATRY & NEUROLOGY

## 2019-06-20 PROCEDURE — 70551 MRI BRAIN STEM W/O DYE: CPT

## 2019-06-20 PROCEDURE — 99231 SBSQ HOSP IP/OBS SF/LOW 25: CPT | Performed by: PSYCHIATRY & NEUROLOGY

## 2019-06-20 PROCEDURE — 83036 HEMOGLOBIN GLYCOSYLATED A1C: CPT | Performed by: PSYCHIATRY & NEUROLOGY

## 2019-06-20 PROCEDURE — 85025 COMPLETE CBC W/AUTO DIFF WBC: CPT | Performed by: INTERNAL MEDICINE

## 2019-06-20 RX ORDER — CYANOCOBALAMIN 1000 UG/ML
1000 INJECTION, SOLUTION INTRAMUSCULAR; SUBCUTANEOUS
Status: DISCONTINUED | OUTPATIENT
Start: 2019-06-20 | End: 2019-06-21 | Stop reason: HOSPADM

## 2019-06-20 RX ADMIN — ATORVASTATIN CALCIUM 20 MG: 20 TABLET, FILM COATED ORAL at 20:11

## 2019-06-20 RX ADMIN — PREGABALIN 100 MG: 100 CAPSULE ORAL at 20:11

## 2019-06-20 RX ADMIN — CLOPIDOGREL BISULFATE 75 MG: 75 TABLET ORAL at 08:53

## 2019-06-20 RX ADMIN — SODIUM CHLORIDE 75 ML/HR: 900 INJECTION, SOLUTION INTRAVENOUS at 02:07

## 2019-06-20 RX ADMIN — PANTOPRAZOLE SODIUM 40 MG: 40 TABLET, DELAYED RELEASE ORAL at 05:04

## 2019-06-20 RX ADMIN — Medication 3 ML: at 20:11

## 2019-06-20 RX ADMIN — ASPIRIN 81 MG 81 MG: 81 TABLET ORAL at 08:54

## 2019-06-20 RX ADMIN — LEVETIRACETAM 500 MG: 500 TABLET, FILM COATED ORAL at 20:11

## 2019-06-20 RX ADMIN — NITROGLYCERIN 10 MCG/MIN: 10 INJECTION INTRAVENOUS at 14:15

## 2019-06-20 RX ADMIN — LEVETIRACETAM 500 MG: 500 TABLET, FILM COATED ORAL at 08:53

## 2019-06-20 RX ADMIN — Medication 3 ML: at 08:55

## 2019-06-20 RX ADMIN — LEVOTHYROXINE SODIUM 25 MCG: 25 TABLET ORAL at 05:02

## 2019-06-20 RX ADMIN — SULFASALAZINE 1000 MG: 500 TABLET ORAL at 20:11

## 2019-06-20 RX ADMIN — MELATONIN 500 UNITS: at 08:54

## 2019-06-20 RX ADMIN — NITROGLYCERIN 10 MCG/MIN: 10 INJECTION INTRAVENOUS at 11:24

## 2019-06-20 RX ADMIN — SODIUM CHLORIDE 75 ML/HR: 900 INJECTION, SOLUTION INTRAVENOUS at 07:42

## 2019-06-20 RX ADMIN — HYDROCHLOROTHIAZIDE 12.5 MG: 12.5 TABLET ORAL at 08:55

## 2019-06-20 RX ADMIN — PREGABALIN 100 MG: 100 CAPSULE ORAL at 08:54

## 2019-06-20 RX ADMIN — SULFASALAZINE 1000 MG: 500 TABLET ORAL at 08:53

## 2019-06-20 RX ADMIN — CYANOCOBALAMIN 1000 MCG: 1000 INJECTION, SOLUTION INTRAMUSCULAR; SUBCUTANEOUS at 15:27

## 2019-06-20 RX ADMIN — PREGABALIN 100 MG: 100 CAPSULE ORAL at 15:27

## 2019-06-20 NOTE — PROGRESS NOTES
"Hospitalist Team      Patient Care Team:  Keith Alston MD as PCP - General        Chief Complaint:  Palpitations, dizziness    Subjective    Interval History and ROS:     Delphine Rob is a 73 y.o. female who presents with dizziness, palpitations, chest pains, headache, and nausea.  Her chest pain was a dull aching pain under her left breast.  She has also had some associated shortness of breath, but that has since resolved.  She is feeling better this morning, but still having some palpitations.        Review of Systems   Reason unable to perform ROS: somnolence.         Objective    Vital Signs  Vital Signs (last 24 hours)       06/17 0700  -  06/18 0659 06/18 0700  -  06/18 1055   Most Recent    Temp (°F) 97.6 -  98.3       97.6 (36.4)    Heart Rate (!)40 -  79    51 -  61     54    Resp 10 -  21       10    BP 98/66 -  146/65    121/78 -  127/69     127/69    SpO2 (%) 90 -  98    93 -  95     93        Oxygen Therapy  SpO2: 94 %  Pulse Oximetry Type: Continuous  Device (Oxygen Therapy): room air  Flow (L/min): 2  Flowsheet Rows      First Filed Value   Admission Height  157.5 cm (62\") Documented at 06/17/2019 1250   Admission Weight  78.5 kg (173 lb 1 oz) Documented at 06/17/2019 1250        Intake & Output (last 3 days)       06/17 0701 - 06/18 0700 06/18 0701 - 06/19 0700 06/19 0701 - 06/20 0700    P.O. 240 480 960    Total Intake(mL/kg) 240 (3) 480 (6.1) 960 (12.2)    Urine (mL/kg/hr)  1050 (0.6) 1150 (0.9)    Stool  0     Total Output  1050 1150    Net +240 -570 -190           Urine Unmeasured Occurrence 3 x      Stool Unmeasured Occurrence  1 x         Lines, Drains & Airways    Active LDAs     Name:   Placement date:   Placement time:   Site:   Days:    Peripheral IV 06/17/19 1334 Left Hand   06/17/19    1334    Hand   less than 1                Physical Exam:  General AAOx3; NAD, not ill appearing  HEENT: PERRLA, EOMI, no conjunctival hemorrhage  Neck: Supple, no LN, no JVD  Heart: irregularly " irregular, no murmur  Chest: CTAB, no wheezing, rales, or rhonchi  Abdo: soft, NT/ND, bowel sounds present  Ext: No edema, no calf tenderness  Skin: Warm, dry, no rash      Results Review:     I reviewed the patient's new clinical results.    Results from last 7 days   Lab Units 06/19/19  1739 06/19/19  0113 06/18/19  1838 06/17/19  1334   WBC 10*3/mm3 6.80 5.40  --  6.30   HEMOGLOBIN g/dL 15.5 14.1  --  14.2   HEMATOCRIT % 46.1 42.7  --  42.0   PLATELETS 10*3/mm3 159 142  --  163   INR  1.04 1.06 1.09 1.08     Results from last 7 days   Lab Units 06/19/19  1729 06/19/19  0113 06/18/19  0552 06/18/19  0551 06/17/19  2323 06/17/19  1911 06/17/19  1334   SODIUM mmol/L 139 137  --  141  --   --  137   POTASSIUM mmol/L 3.6 3.3*  --  4.4  --   --  4.4   CHLORIDE mmol/L 107 106  --  112*  --   --  105   CO2 mmol/L 23.0 23.0  --  15.0*  --   --  23.0   BUN mg/dL 7* 9  --  14  --   --  15   CREATININE mg/dL 0.90 1.00  --  1.20*  --   --  1.10*   GLUCOSE mg/dL 113* 114*  --  101*  --   --  82   CALCIUM mg/dL 9.0 8.5*  --  8.7*  --   --  9.2   ALT (SGPT) U/L  --   --   --   --   --   --  13*   AST (SGOT) U/L  --   --   --   --   --   --  28   TROPONIN I ng/mL  --   --  <0.030  --  <0.030 <0.030 <0.030       Microbiology Results Abnormal     None        Ct Head Without Contrast    Result Date: 6/19/2019  1. No acute hemorrhage, mass effect or midline shift. No change from 06/17/2019. 2. Mild periventricular white matter hypodensity likely representing sequelae of chronic small vessel ischemic disease. Stable findings of old lacunar infarct involving the left basal ganglia.  Electronically Signed By-Kishan Cardoso On:6/19/2019 6:03 PM This report was finalized on [id number] by  Kishan Cardoso, .    Xr Chest 1 View    Result Date: 6/19/2019  No acute cardiopulmonary abnormality. Stable findings of prior CABG.  Electronically Signed By-Ksihan Cardoso On:6/19/2019 6:44 PM This report was finalized on [id number] by   Kishan Cardoso .           Xrays, labs reviewed personally by physician.    ECG/EMG Results (most recent)     Procedure Component Value Units Date/Time    ECG 12 Lead [[id number]] Collected:  06/17/19 1257     Updated:  06/17/19 2234    Narrative:       HEART RATE= 61  bpm  RR Interval= 991  ms  VT Interval= 186  ms  P Horizontal Axis= -28  deg  P Front Axis= 53  deg  QRSD Interval= 90  ms  QT Interval= 472  ms  QRS Axis= 5  deg  T Wave Axis= 35  deg  - ABNORMAL ECG -  Sinus rhythm  Ventricular bigeminy  Probable left atrial enlargement  Inferior infarct, old  Electronically Signed By:   Date and Time of Study: 2019-06-17 12:57:21    ECG 12 Lead [[id number]] Collected:  06/18/19 1512     Updated:  06/18/19 1514    Narrative:       HEART RATE= 53  bpm  RR Interval= 1080  ms  VT Interval= 206  ms  P Horizontal Axis= -13  deg  P Front Axis= 70  deg  QRSD Interval= 88  ms  QT Interval= 470  ms  QRS Axis= 5  deg  T Wave Axis= 46  deg  - ABNORMAL ECG -  Sinus bradycardia  Multiple ventricular premature complexes  Probable left atrial enlargement  Electronically Signed By:   Date and Time of Study: 2019-06-18 15:12:12    Adult Transthoracic Echo Complete W/ Cont if Necessary Per Protocol [[id number]] Collected:  06/19/19 1844     Updated:  06/19/19 1953     BSA 1.8 m^2       CV ECHO JAI - RVDD 2.6 cm      IVSd 0.98 cm      IVSs 1.4 cm      LVIDd 4.0 cm      LVIDs 3.1 cm      LVPWd 0.95 cm       CV ECHO JAI - LVPWS 1.2 cm      IVS/LVPW 1.0     FS 23.6 %      EDV(Teich) 71.7 ml      ESV(Teich) 37.5 ml      EF(Teich) 47.7 %      EDV(cubed) 65.9 ml      ESV(cubed) 29.4 ml      EF(cubed) 55.5 %      % IVS thick 40.5 %      % LVPW thick 25.2 %      LV mass(C)d 122.7 grams      LV mass(C)dI 68.3 grams/m^2      LV mass(C)s 126.3 grams      LV mass(C)sI 70.3 grams/m^2      SV(Teich) 34.2 ml      SI(Teich) 19.0 ml/m^2      SV(cubed) 36.6 ml      SI(cubed) 20.4 ml/m^2      Ao root diam 2.4 cm      Ao root area 4.6 cm^2      ACS  1.6 cm      LVOT diam 1.7 cm      LVOT area 2.2 cm^2      EDV(MOD-sp4) 69.6 ml      ESV(MOD-sp4) 25.2 ml      EF(MOD-sp4) 63.8 %      EDV(MOD-sp2) 72.0 ml      ESV(MOD-sp2) 29.9 ml      EF(MOD-sp2) 58.5 %      SV(MOD-sp4) 44.4 ml      SI(MOD-sp4) 24.7 ml/m^2      SV(MOD-sp2) 42.1 ml      SI(MOD-sp2) 23.4 ml/m^2      Ao root area (BSA corrected) 1.3     LV Benavides Vol (BSA corrected) 38.8 ml/m^2      LV Sys Vol (BSA corrected) 14.0 ml/m^2      MV E max milo 69.6 cm/sec      MV A max milo 80.9 cm/sec      MV E/A 0.86     MV V2 max 90.8 cm/sec      MV max PG 3.3 mmHg      MV V2 mean 65.3 cm/sec      MV mean PG 1.9 mmHg      MV V2 VTI 26.7 cm      MVA(VTI) 2.0 cm^2      MV dec slope 269.9 cm/sec^2      MV dec time 0.26 sec      Ao pk milo 189.4 cm/sec      Ao max PG 14.4 mmHg      Ao max PG (full) 5.9 mmHg      Ao V2 mean 124.0 cm/sec      Ao mean PG 7.2 mmHg      Ao mean PG (full) 3.5 mmHg      Ao V2 VTI 34.4 cm      JORGE ALBERTO(I,A) 1.6 cm^2      JORGE ALBERTO(I,D) 1.6 cm^2      JORGE ALBERTO(V,A) 1.7 cm^2      JORGE ALBERTO(V,D) 1.7 cm^2      LV V1 max PG 8.5 mmHg      LV V1 mean PG 3.7 mmHg      LV V1 max 145.9 cm/sec      LV V1 mean 87.0 cm/sec      LV V1 VTI 25.0 cm      SV(Ao) 157.3 ml      SI(Ao) 87.5 ml/m^2      SV(LVOT) 54.5 ml      SI(LVOT) 30.3 ml/m^2      PA V2 max 92.2 cm/sec      PA max PG 3.4 mmHg      PA max PG (full) 1.6 mmHg      PA V2 mean 64.7 cm/sec      PA mean PG 1.8 mmHg      PA mean PG (full) 0.64 mmHg      PA V2 VTI 19.0 cm      PA acc time 0.09 sec      RV V1 max PG 1.8 mmHg      RV V1 mean PG 1.2 mmHg      RV V1 max 67.8 cm/sec      RV V1 mean 52.5 cm/sec      RV V1 VTI 14.9 cm      TR max milo 181.0 cm/sec      RVSP(TR) 16.1 mmHg      RAP systole 3.0 mmHg      PA pr(Accel) 40.1 mmHg      Pulm Sys Milo 67.2 cm/sec      Pulm Benavides Milo 52.8 cm/sec      Pulm S/D 1.3     Pulm A Revs Dur 0.08 sec      Pulm A Revs Milo 27.3 cm/sec       CV ECHO JAI - BZI_BMI 31.6 kilograms/m^2       CV ECHO JAI - BSA(HAYCOCK) 1.9 m^2       CV ECHO JAI  - BZI_METRIC_WEIGHT 78.5 kg       CV ECHO JAI - BZI_METRIC_HEIGHT 157.5 cm      Target HR (85%) 126 bpm      Max. Pred. HR (100%) 148 bpm      EF(MOD-bp) 60.0 %      LA dimension(2D) 3.4 cm             Medication Review:   I have reviewed the patient's current medication list  Scheduled Meds:    aspirin 81 mg Oral Daily   atorvastatin 20 mg Oral Nightly   cholecalciferol 500 Units Oral Daily   clopidogrel 75 mg Oral Daily   hydrochlorothiazide 12.5 mg Oral Daily   levETIRAcetam 500 mg Oral Q12H   levothyroxine 25 mcg Oral Q AM   pantoprazole 40 mg Oral Q AM   pregabalin 100 mg Oral TID   sodium chloride 3 mL Intravenous Q12H   sulfaSALAzine 1,000 mg Oral BID     Continuous Infusions:    nitroglycerin 10-50 mcg/min Last Rate: 10 mcg/min (06/19/19 1845)   sodium chloride 75 mL/hr Last Rate: 75 mL/hr (06/19/19 2032)     PRN Meds:.•  acetaminophen  •  acetaminophen  •  atropine  •  diphenhydrAMINE  •  melatonin  •  nitroglycerin  •  ondansetron **OR** ondansetron  •  ondansetron **OR** ondansetron  •  [COMPLETED] Insert peripheral IV **AND** sodium chloride  •  sodium chloride  •  sodium chloride  •  tiZANidine      Assessment/Plan     Chest pain in adult    Ventricular bigeminy    Bradycardia with 51-60 beats per minute      Bradycardia, bigeminy on EKG   - hold metoprolol  - Cardiology consult pending     Chest pain  - serial troponins negative x3 - ruled out for ACS  - on IV nitro drip     Dizziness/weakness  - secondary to above  - falls precautions     CAD s/p CABG 4v  - cont home aspirin, plavix, statin     PVD s/p right fem-pop bypass  - on plavix, statin as above     Hypertension  - hold meds due to borderline bp and on nitro drip     Seizures  - cont home keppra     Hypothyroidism  - check tsh and cont home synthroid     Arthritis  - cont home lyrica     H/H  - cont home protonix     DVT prophylaxis - lovenox          Plan for disposition: likely home soon pending final Neuro recommendation    Bethany HARRISON  MD Leonela  06/19/19  11:40 PM

## 2019-06-20 NOTE — PLAN OF CARE
Problem: Patient Care Overview  Goal: Individualization and Mutuality  Outcome: Ongoing (interventions implemented as appropriate)      Problem: Skin Injury Risk (Adult)  Goal: Identify Related Risk Factors and Signs and Symptoms  Outcome: Ongoing (interventions implemented as appropriate)   06/20/19 1810   Skin Injury Risk (Adult)   Related Risk Factors (Skin Injury Risk) mobility impaired;advanced age     Goal: Skin Health and Integrity  Outcome: Ongoing (interventions implemented as appropriate)   06/20/19 1810   Skin Injury Risk (Adult)   Skin Health and Integrity making progress toward outcome

## 2019-06-20 NOTE — PROGRESS NOTES
S:  The patient is lying in bed.  States she continues to have double vision.     O: Vitals stable.  MRI of Brain done results pending.  O/E The patient is lying in supine position.  Speech is good.  CN EOMI, double vision is noted on looking side ways and upward.  No facial droop. Motor 5/5.  Sensory decrease on left side.  Cerebellum finger to nose intact.      A:  The patient is a 73 year old lady with multiple medical problems with probable brainstem stroke.      P:  Work up in progress.  Will continue current care.  Will follow.

## 2019-06-20 NOTE — PROGRESS NOTES
Referring Provider: Bethany Gipson MD    Reason for follow-up:  Status post CABG  Coronary artery disease  Visual symptoms-new problem-possible occipital stroke.  Bradycardia  Premature ventricular contractions.     Patient Care Team:  Keith Alston MD as PCP - General    Subjective .  Feeling better except having visual symptoms.     ROS  Possible nystagmus visual symptoms    Since I have last seen, the patient has been without any chest discomfort ,shortness of breath, palpitations, dizziness or syncope.  Denies having any headache ,abdominal pain ,nausea, vomiting , diarrhea constipation, loss of weight or loss of appetite.  Denies having any excessive bruising ,hematuria or blood in the stool.    Review of all systems negative except as indicated    History  Past Medical History:   Diagnosis Date   • Arthritis    • Atrial fibrillation (CMS/HCC)    • Bronchitis    • Chickenpox    • COPD (chronic obstructive pulmonary disease) (CMS/HCC)    • Cramps of lower extremity     bilateral with weakness   • Heart disease    • Hepatitis C 2013    treatment x 11 months   • High cholesterol    • History of degenerative disc disease    • HLD (hyperlipidemia)    • HTN (hypertension)    • Hypothyroidism    • Measles    • Seizure disorder (CMS/HCC)    • Skin melanoma (CMS/HCC) 2013    s/p lymph node removal on the right   • Sleep apnea    • Stroke (CMS/HCC)    • Whooping cough        Past Surgical History:   Procedure Laterality Date   • APPENDECTOMY     • AXILLARY NODE DISSECTION Right 05/2014   • CATARACT EXTRACTION  01/2017    x 2   • CHOLECYSTECTOMY  2004   • COLONOSCOPY  04/05/2017   • CORONARY ANGIOPLASTY WITH STENT PLACEMENT      stent placement after CABG   • CORONARY ARTERY BYPASS GRAFT  01/20/2010   • CORONARY ARTERY BYPASS GRAFT     • FEMORAL POPLITEAL BYPASS Right 07/20/2017    Dr. Palmer   • INCISIONAL HERNIA REPAIR  09/28/2016    lap. Dr. West   • MOLE REMOVAL Right     arm   • OTHER SURGICAL HISTORY       laser surgery X 2   • PARTIAL HYSTERECTOMY     • THYROIDECTOMY, PARTIAL      goiter removed 1/2 thyroid       Family History   Problem Relation Age of Onset   • Hypertension Mother    • Cancer Mother    • Gout Mother    • Asthma Mother    • Cancer Father    • Heart disease Brother    • Diabetes Other    • Cancer Other    • Tuberculosis Other        Social History     Tobacco Use   • Smoking status: Former Smoker     Last attempt to quit: 6/17/2009     Years since quitting: 10.0   Substance Use Topics   • Alcohol use: No     Frequency: Never   • Drug use: No        Medications Prior to Admission   Medication Sig Dispense Refill Last Dose   • amLODIPine (NORVASC) 5 MG tablet Take 5 mg by mouth Daily.   6/17/2019 at Unknown time   • atorvastatin (LIPITOR) 10 MG tablet Take 20 mg by mouth Every Night.   6/16/2019 at Unknown time   • Cholecalciferol 87692 units tablet Take 2,000 Units by mouth 2 (Two) Times a Day.   6/17/2019 at Unknown time   • clopidogrel (PLAVIX) 75 MG tablet Take 75 mg by mouth Daily.   6/17/2019 at Unknown time   • ezetimibe (ZETIA) 10 MG tablet Take 10 mg by mouth Daily.   6/17/2019 at Unknown time   • hydrochlorothiazide (HYDRODIURIL) 25 MG tablet Take 12.5 mg by mouth Daily.   6/17/2019 at Unknown time   • levETIRAcetam (KEPPRA) 500 MG tablet Take 500 mg by mouth 2 (Two) Times a Day.   6/17/2019 at Unknown time   • levothyroxine (SYNTHROID, LEVOTHROID) 25 MCG tablet Take 25 mcg by mouth Daily.   6/17/2019 at Unknown time   • lisinopril (PRINIVIL,ZESTRIL) 20 MG tablet Take 20 mg by mouth Daily.   6/17/2019 at Unknown time   • metoprolol succinate XL (TOPROL-XL) 100 MG 24 hr tablet Take 100 mg by mouth Daily.   6/17/2019 at Unknown time   • pantoprazole (PROTONIX) 20 MG EC tablet Take 20 mg by mouth Daily.   6/17/2019 at Unknown time   • pregabalin (LYRICA) 100 MG capsule Take 100 mg by mouth 3 (Three) Times a Day.   6/17/2019 at Unknown time   • sulfaSALAzine (AZULFIDINE) 500 MG tablet Take  "1,000 mg by mouth 2 (Two) Times a Day.   6/17/2019 at Unknown time   • tiZANidine (ZANAFLEX) 4 MG tablet Take 2 mg by mouth At Night As Needed for Muscle Spasms.   6/16/2019 at Unknown time       Allergies  Penicillins    Scheduled Meds:  aspirin 81 mg Oral Daily   atorvastatin 20 mg Oral Nightly   cholecalciferol 500 Units Oral Daily   clopidogrel 75 mg Oral Daily   hydrochlorothiazide 12.5 mg Oral Daily   levETIRAcetam 500 mg Oral Q12H   levothyroxine 25 mcg Oral Q AM   pantoprazole 40 mg Oral Q AM   pregabalin 100 mg Oral TID   sodium chloride 3 mL Intravenous Q12H   sulfaSALAzine 1,000 mg Oral BID     Continuous Infusions:  nitroglycerin 10-50 mcg/min Last Rate: 10 mcg/min (06/20/19 0208)   sodium chloride 75 mL/hr Last Rate: 75 mL/hr (06/20/19 0207)     PRN Meds:.•  acetaminophen  •  acetaminophen  •  atropine  •  diphenhydrAMINE  •  melatonin  •  nitroglycerin  •  ondansetron **OR** ondansetron  •  ondansetron **OR** ondansetron  •  [COMPLETED] Insert peripheral IV **AND** sodium chloride  •  sodium chloride  •  sodium chloride  •  tiZANidine    Objective     VITAL SIGNS  Vitals:    06/20/19 0500 06/20/19 0600 06/20/19 0601 06/20/19 0635   BP: 158/71  138/80    BP Location:   Left arm    Patient Position:   Lying    Pulse: 71 66 56    Resp:   17    Temp:   98.2 °F (36.8 °C)    TempSrc:   Oral    SpO2: 95% 93% 92%    Weight:    77.8 kg (171 lb 8.3 oz)   Height:           Flowsheet Rows      First Filed Value   Admission Height  157.5 cm (62\") Documented at 06/17/2019 1250   Admission Weight  78.5 kg (173 lb 1 oz) Documented at 06/17/2019 1250           TELEMETRY: Sinus rhythm premature ventricular contractions.    Physical Exam:  The patient is alert, oriented and in no distress.  Vital signs as noted above.  Head and neck revealed no carotid bruits or jugular venous distention.  No thyromegaly or lymphadenopathy is present  Lungs clear.  No wheezing.  Breath sounds are normal bilaterally.  Heart normal first " and second heart sounds.  No murmur. No precordial rub is present.  No gallop is present.  Abdomen soft and nontender.  No organomegaly is present.  Extremities with good peripheral pulses without any pedal edema.  Skin warm and dry.  Musculoskeletal system is grossly normal  CNS grossly normal.  Patient has visual symptoms although she has motor function well preserved.  Possible diplopia.  Possible occipital      Results Review:   I reviewed the patient's new clinical results.  Lab Results (last 24 hours)     Procedure Component Value Units Date/Time    Lipid Panel [[id number]]  (Abnormal) Collected:  06/20/19 0215    Specimen:  Blood Updated:  06/20/19 0359     Total Cholesterol 106 mg/dL      Triglycerides 129 mg/dL      HDL Cholesterol 30 mg/dL      LDL Cholesterol  61 mg/dL      VLDL Cholesterol 25.8 mg/dL      LDL/HDL Ratio 1.67     Chol/HDL Ratio 3.53    Narrative:       The following guidelines have been recommended by the NCEP for Total Cholesterol, Total Triglycerides, LDL Cholesterol, and HDL Cholesterols    Total Cholesterol  Desirable:        <200 mg/dL  Borderline High:  200-239 mg/dL  High:             > or = 240 mg/dL    Total Triglyceride  Normal:           <150 mg/dL  Borderline High:  150-199 mg/dL  High:             200-499 mg/dL  Very High:        > or = 500 mg/dL    HDL Cholesterol  Low HDL:          <40 mg/dL  Normal:           40-60 mg/dL  Desirable:        >60 mg/dL    LDL Cholesterol  Optimal:          <100 mg/dL  Low Risk:         100-129 mg/dL  Borderline High:  130-159 mg/dL  High:             160-189 mg/dL  Very High:        > or = 190 mg/dL    The following ratios of LDL to HDL and Total cholesterol to HDL are for information only:    LDL/HDL Ratio  Desirable:        <5  Optimal:          < or = 3.5    Total Cholesterol/HDL Ratio  Low Risk:         3.3-4.4  Average Risk:     4.4-7.1  Medium Risk:      7.1-11  High Risk:        >11       Basic Metabolic Panel [[id number]]  (Abnormal)  Collected:  06/20/19 0215    Specimen:  Blood Updated:  06/20/19 0359     Glucose 102 mg/dL      BUN 8 mg/dL      Creatinine 0.90 mg/dL      Sodium 140 mmol/L      Potassium 4.3 mmol/L      Chloride 107 mmol/L      CO2 25.0 mmol/L      Calcium 8.8 mg/dL      eGFR Non African Amer 62 mL/min/1.73      BUN/Creatinine Ratio 8.9     Anion Gap 8.0 mmol/L     Narrative:       The MDRD GFR formula is only valid for adults with stable renal function between ages 18 and 70.    Vitamin B12 [[id number]]  (Normal) Collected:  06/20/19 0215    Specimen:  Blood Updated:  06/20/19 0359     Vitamin B-12 252 pg/mL      Comment: Results may be falsely increased if patient taking Biotin.       TSH [[id number]]  (Normal) Collected:  06/20/19 0215    Specimen:  Blood Updated:  06/20/19 0359     TSH 4.240 mIU/mL      Comment: Results may be falsely decreased if patient taking Biotin.       CBC & Differential [[id number]] Collected:  06/20/19 0215    Specimen:  Blood Updated:  06/20/19 0309    Narrative:       The following orders were created for panel order CBC & Differential.  Procedure                               Abnormality         Status                     ---------                               -----------         ------                     CBC Auto Differential[[id number]]        Normal              Final result                 Please view results for these tests on the individual orders.    CBC Auto Differential [[id number]]  (Normal) Collected:  06/20/19 0215    Specimen:  Blood Updated:  06/20/19 0309     WBC 6.00 10*3/mm3      RBC 4.53 10*6/mm3      Hemoglobin 14.3 g/dL      Hematocrit 42.7 %      MCV 94.3 fL      MCH 31.5 pg      MCHC 33.4 g/dL      RDW 12.4 %      RDW-SD 41.1 fl      MPV 10.1 fL      Platelets 151 10*3/mm3      Neutrophil % 54.1 %      Lymphocyte % 35.1 %      Monocyte % 8.1 %      Eosinophil % 1.8 %      Basophil % 0.9 %      Neutrophils, Absolute 3.20 10*3/mm3      Lymphocytes, Absolute 2.10 10*3/mm3       Monocytes, Absolute 0.50 10*3/mm3      Eosinophils, Absolute 0.10 10*3/mm3      Basophils, Absolute 0.10 10*3/mm3      nRBC 0.1 /100 WBC     Hemoglobin A1c [[id number]] Collected:  06/20/19 0215    Specimen:  Blood Updated:  06/20/19 0303    Extra Tubes [[id number]] Collected:  06/19/19 1729    Specimen:  Blood, Venous Line Updated:  06/19/19 1831    Narrative:       The following orders were created for panel order Extra Tubes.  Procedure                               Abnormality         Status                     ---------                               -----------         ------                     Gold Top - SST[[id number]]                                   Final result               Gold Top - SST[[id number]]                                   Final result                 Please view results for these tests on the individual orders.    Gold Top - SST [[id number]] Collected:  06/19/19 1729    Specimen:  Blood Updated:  06/19/19 1831     Extra Tube Hold for add-ons.     Comment: Auto resulted.       Gold Top - SST [[id number]] Collected:  06/19/19 1729    Specimen:  Blood Updated:  06/19/19 1831     Extra Tube Hold for add-ons.     Comment: Auto resulted.       Basic Metabolic Panel [[id number]]  (Abnormal) Collected:  06/19/19 1729    Specimen:  Blood Updated:  06/19/19 1814     Glucose 113 mg/dL      BUN 7 mg/dL      Creatinine 0.90 mg/dL      Sodium 139 mmol/L      Potassium 3.6 mmol/L      Chloride 107 mmol/L      CO2 23.0 mmol/L      Calcium 9.0 mg/dL      eGFR Non African Amer 62 mL/min/1.73      BUN/Creatinine Ratio 7.8     Anion Gap 9.0 mmol/L     Narrative:       The MDRD GFR formula is only valid for adults with stable renal function between ages 18 and 70.    aPTT [[id number]]  (Normal) Collected:  06/19/19 1739    Specimen:  Blood Updated:  06/19/19 1806     PTT 25.9 seconds     Protime-INR [[id number]]  (Normal) Collected:  06/19/19 1739    Specimen:  Blood Updated:  06/19/19 1806     Protime 10.6 Seconds       INR 1.04    CBC & Differential [[id number]] Collected:  06/19/19 1739    Specimen:  Blood Updated:  06/19/19 1759    Narrative:       The following orders were created for panel order CBC & Differential.  Procedure                               Abnormality         Status                     ---------                               -----------         ------                     CBC Auto Differential[[id number]]        Abnormal            Final result                 Please view results for these tests on the individual orders.    CBC Auto Differential [[id number]]  (Abnormal) Collected:  06/19/19 1739    Specimen:  Blood Updated:  06/19/19 1759     WBC 6.80 10*3/mm3      RBC 4.88 10*6/mm3      Hemoglobin 15.5 g/dL      Hematocrit 46.1 %      MCV 94.4 fL      MCH 31.7 pg      MCHC 33.6 g/dL      RDW 12.8 %      RDW-SD 42.0 fl      MPV 9.9 fL      Platelets 159 10*3/mm3      Neutrophil % 56.7 %      Lymphocyte % 31.6 %      Monocyte % 9.8 %      Eosinophil % 1.5 %      Basophil % 0.4 %      Neutrophils, Absolute 3.90 10*3/mm3      Lymphocytes, Absolute 2.20 10*3/mm3      Monocytes, Absolute 0.70 10*3/mm3      Eosinophils, Absolute 0.10 10*3/mm3      Basophils, Absolute 0.00 10*3/mm3      nRBC 0.3 /100 WBC     POC Glucose Once [[id number]]  (Normal) Collected:  06/19/19 1733    Specimen:  Blood Updated:  06/19/19 1734     Glucose 100 mg/dL      Comment: Serial Number: [id number]:  [id number]             Imaging Results (last 24 hours)     Procedure Component Value Units Date/Time    XR Chest 1 View [[id number]] Collected:  06/19/19 1843     Updated:  06/19/19 1846    Narrative:       DATE OF EXAM:  6/19/2019 6:16 PM     PROCEDURE:  XR CHEST 1 VW-     INDICATIONS:  stroke protocol     COMPARISON:  Chest radiograph dated 06/17/2019     TECHNIQUE:   Single AP radiographic view of the chest was obtained.     FINDINGS:  The cardiac mediastinal silhouette is within normal limits. Postsurgical  changes of prior CABG. Median  sternotomy wires are present. There is a  large calcified granuloma within the right right hilum. Pulmonary  vascularity is within normal limits. There is no focal airspace  consolidation, pleural effusion, or pneumothorax. There are postsurgical  changes of prior right axillary dissection.       Impression:       No acute cardiopulmonary abnormality. Stable findings of prior CABG.     Electronically Signed By-Kishan Cardoso On:6/19/2019 6:44 PM  This report was finalized on [id number] by  Kishan Cardoso, .    CT Angiogram Neck With & Without Contrast [[id number]] Updated:  06/19/19 1812    CT Angiogram Head With & Without Contrast [[id number]] Updated:  06/19/19 1812    CT Head Without Contrast [[id number]] Collected:  06/19/19 1759     Updated:  06/19/19 1805    Narrative:       DATE OF EXAM:  6/19/2019 5:50 PM     PROCEDURE:   CT HEAD WO CONTRAST-     INDICATIONS:   Stroke Right-sided numbness, blurry vision within the right eye, recent  cardiac catheterization     COMPARISON:  Head CT dated 06/17/2019     TECHNIQUE:   Routine transaxial cuts were obtained through the head without the  administration of contrast. Automated exposure control and iterative  reconstruction methods were used.      FINDINGS:  The ventricles and sulci are mildly prominent compatible with mild  global cerebral volume loss. There is no mass effect or midline shift.  There is no acute intracranial hemorrhage. The gray-white matter  differentiation is preserved. There are findings of old lacunar infarct  involving the left basal ganglia and left subinsular white matter. There  is mild periventricular white matter hypodensity likely representing  sequelae of chronic small vessel ischemic disease. There is no abnormal  extra-axial fluid collection. The calvarium is intact. The mastoid air  cells, middle ears, and paranasal sinuses are well aerated. The  visualized orbits and globes are symmetric        Impression:       1. No acute  hemorrhage, mass effect or midline shift. No change from  06/17/2019.  2. Mild periventricular white matter hypodensity likely representing  sequelae of chronic small vessel ischemic disease. Stable findings of  old lacunar infarct involving the left basal ganglia.     Electronically Signed By-Kishan Cardoso On:6/19/2019 6:03 PM  This report was finalized on [id number] by  Kishan Cardoso, .          EKG      I personally viewed and interpreted the patient's EKG/Telemetry data:    ECHOCARDIOGRAM:             STRESS MYOVIEW:    Cardiolite (Tc-99m Sestamibi) stress test    CARDIAC CATHETERIZATION:          OTHER:         Assessment/Plan  /////////////////  impression  ---------------------------  -Chest discomfort suggestive of unstable angina factors.  EKG showed no acute changes except PVCs.    Troponin levels are negative     -Status post CABG 01/20/2010 at [facility].  Status post stent placement after CABG ( details not available    )Cardiac catheterization and coronary angiography 6/19/2019 revealed  Left ventricle size and contractility is normal with proximal inferior wall hypokinesis.  Ejection fraction is 55%.     Left main artery is normal.  Left anterior descending artery is noted in the midsegment with filling of the LAD through LIMA.  Circumflex coronary artery is normal  Right coronary artery is a very small caliber vessel with 99% disease in the midsegment with filling of the distal vessel through collaterals.     RAUSCH to LAD is patent  SVG to marginal branch is patent  SVG to RCA is totally occluded.       Echocardiogram 11/13/2017 revealed mild mitral regurgitation and normal left ventricular function.  Carri scan Cardiolite test is negative for myocardial ischemia 11/13/2017     -Intermittent atrial fibrillation.  Patient is maintaining sinus rhythm.       -History of  stroke from which patient has recovered.      -Hypertension dyslipidemia and hypothyroidism      -Status post  thyroidectomy for cyst cholecystectomy and hysterectomy.      -History of hepatitis-C      -Allergy to penicillin      -Former smoker quit smoking October 2013.      -Status post  Malignant melanoma removal from right elbow area.  Patient did not need chemotherapy     -history of peripheral vascular disease.  CTA 06/30/2017 revealed right superficial femoral artery occlusion  ---------------.  Plan  ----------------------  Cardiac catheterization results 6/19/2019-as described above.  Patient had visual symptoms.  Neurological consultation is appreciated.  Possible occipital stroke although no definite documentation is available at this time.  EKG showed sinus rhythm premature ventricle contraction nonspecific ST- T changes normal axis  Patient is off nitroglycerin.  Continue Plavix.  Follow neurological status closely.  Further management on patient's progress.  Follow-up labs ordered.      Active Problems:    Ventricular bigeminy    Bradycardia with 51-60 beats per minute    Chest pain in adult    Real Winn MD  06/20/19  6:46 AM

## 2019-06-20 NOTE — PLAN OF CARE
Problem: Patient Care Overview  Goal: Plan of Care Review  Outcome: Ongoing (interventions implemented as appropriate)   06/20/19 0827   Coping/Psychosocial   Plan of Care Reviewed With patient   OTHER   Outcome Summary Patient is still experiencing visual disturbances.   Plan of Care Review   Progress no change

## 2019-06-21 VITALS
DIASTOLIC BLOOD PRESSURE: 71 MMHG | BODY MASS INDEX: 31.68 KG/M2 | WEIGHT: 172.18 LBS | OXYGEN SATURATION: 96 % | HEIGHT: 62 IN | HEART RATE: 54 BPM | RESPIRATION RATE: 19 BRPM | SYSTOLIC BLOOD PRESSURE: 148 MMHG | TEMPERATURE: 97.3 F

## 2019-06-21 PROBLEM — R07.9 CHEST PAIN IN ADULT: Status: RESOLVED | Noted: 2019-06-17 | Resolved: 2019-06-21

## 2019-06-21 PROBLEM — R00.1 BRADYCARDIA WITH 51-60 BEATS PER MINUTE: Status: RESOLVED | Noted: 2019-06-17 | Resolved: 2019-06-21

## 2019-06-21 LAB
ANION GAP SERPL CALCULATED.3IONS-SCNC: 9 MMOL/L (ref 10–20)
BASOPHILS # BLD AUTO: 0 10*3/MM3 (ref 0–0.2)
BASOPHILS NFR BLD AUTO: 0.5 % (ref 0–1.5)
BH CV ECHO MEAS - % IVS THICK: 40.5 %
BH CV ECHO MEAS - % LVPW THICK: 25.2 %
BH CV ECHO MEAS - ACS: 1.6 CM
BH CV ECHO MEAS - AO MAX PG (FULL): 5.9 MMHG
BH CV ECHO MEAS - AO MAX PG: 14.4 MMHG
BH CV ECHO MEAS - AO MEAN PG (FULL): 3.5 MMHG
BH CV ECHO MEAS - AO MEAN PG: 7.2 MMHG
BH CV ECHO MEAS - AO ROOT AREA (BSA CORRECTED): 1.3
BH CV ECHO MEAS - AO ROOT AREA: 4.6 CM^2
BH CV ECHO MEAS - AO ROOT DIAM: 2.4 CM
BH CV ECHO MEAS - AO V2 MAX: 189.4 CM/SEC
BH CV ECHO MEAS - AO V2 MEAN: 124 CM/SEC
BH CV ECHO MEAS - AO V2 VTI: 34.4 CM
BH CV ECHO MEAS - AVA(I,A): 1.6 CM^2
BH CV ECHO MEAS - AVA(I,D): 1.6 CM^2
BH CV ECHO MEAS - AVA(V,A): 1.7 CM^2
BH CV ECHO MEAS - AVA(V,D): 1.7 CM^2
BH CV ECHO MEAS - BSA(HAYCOCK): 1.9 M^2
BH CV ECHO MEAS - BSA: 1.8 M^2
BH CV ECHO MEAS - BZI_BMI: 31.6 KILOGRAMS/M^2
BH CV ECHO MEAS - BZI_METRIC_HEIGHT: 157.5 CM
BH CV ECHO MEAS - BZI_METRIC_WEIGHT: 78.5 KG
BH CV ECHO MEAS - EDV(CUBED): 65.9 ML
BH CV ECHO MEAS - EDV(MOD-SP2): 72 ML
BH CV ECHO MEAS - EDV(MOD-SP4): 69.6 ML
BH CV ECHO MEAS - EDV(TEICH): 71.7 ML
BH CV ECHO MEAS - EF(CUBED): 55.5 %
BH CV ECHO MEAS - EF(MOD-BP): 60 %
BH CV ECHO MEAS - EF(MOD-SP2): 58.5 %
BH CV ECHO MEAS - EF(MOD-SP4): 63.8 %
BH CV ECHO MEAS - EF(TEICH): 47.7 %
BH CV ECHO MEAS - ESV(CUBED): 29.4 ML
BH CV ECHO MEAS - ESV(MOD-SP2): 29.9 ML
BH CV ECHO MEAS - ESV(MOD-SP4): 25.2 ML
BH CV ECHO MEAS - ESV(TEICH): 37.5 ML
BH CV ECHO MEAS - FS: 23.6 %
BH CV ECHO MEAS - IVS/LVPW: 1
BH CV ECHO MEAS - IVSD: 0.98 CM
BH CV ECHO MEAS - IVSS: 1.4 CM
BH CV ECHO MEAS - LA DIMENSION(2D): 3.4 CM
BH CV ECHO MEAS - LV DIASTOLIC VOL/BSA (35-75): 38.8 ML/M^2
BH CV ECHO MEAS - LV MASS(C)D: 122.7 GRAMS
BH CV ECHO MEAS - LV MASS(C)DI: 68.3 GRAMS/M^2
BH CV ECHO MEAS - LV MASS(C)S: 126.3 GRAMS
BH CV ECHO MEAS - LV MASS(C)SI: 70.3 GRAMS/M^2
BH CV ECHO MEAS - LV MAX PG: 8.5 MMHG
BH CV ECHO MEAS - LV MEAN PG: 3.7 MMHG
BH CV ECHO MEAS - LV SYSTOLIC VOL/BSA (12-30): 14 ML/M^2
BH CV ECHO MEAS - LV V1 MAX: 145.9 CM/SEC
BH CV ECHO MEAS - LV V1 MEAN: 87 CM/SEC
BH CV ECHO MEAS - LV V1 VTI: 25 CM
BH CV ECHO MEAS - LVIDD: 4 CM
BH CV ECHO MEAS - LVIDS: 3.1 CM
BH CV ECHO MEAS - LVOT AREA: 2.2 CM^2
BH CV ECHO MEAS - LVOT DIAM: 1.7 CM
BH CV ECHO MEAS - LVPWD: 0.95 CM
BH CV ECHO MEAS - LVPWS: 1.2 CM
BH CV ECHO MEAS - MV A MAX VEL: 80.9 CM/SEC
BH CV ECHO MEAS - MV DEC SLOPE: 269.9 CM/SEC^2
BH CV ECHO MEAS - MV DEC TIME: 0.26 SEC
BH CV ECHO MEAS - MV E MAX VEL: 69.6 CM/SEC
BH CV ECHO MEAS - MV E/A: 0.86
BH CV ECHO MEAS - MV MAX PG: 3.3 MMHG
BH CV ECHO MEAS - MV MEAN PG: 1.9 MMHG
BH CV ECHO MEAS - MV V2 MAX: 90.8 CM/SEC
BH CV ECHO MEAS - MV V2 MEAN: 65.3 CM/SEC
BH CV ECHO MEAS - MV V2 VTI: 26.7 CM
BH CV ECHO MEAS - MVA(VTI): 2 CM^2
BH CV ECHO MEAS - PA ACC TIME: 0.09 SEC
BH CV ECHO MEAS - PA MAX PG (FULL): 1.6 MMHG
BH CV ECHO MEAS - PA MAX PG: 3.4 MMHG
BH CV ECHO MEAS - PA MEAN PG (FULL): 0.64 MMHG
BH CV ECHO MEAS - PA MEAN PG: 1.8 MMHG
BH CV ECHO MEAS - PA PR(ACCEL): 40.1 MMHG
BH CV ECHO MEAS - PA V2 MAX: 92.2 CM/SEC
BH CV ECHO MEAS - PA V2 MEAN: 64.7 CM/SEC
BH CV ECHO MEAS - PA V2 VTI: 19 CM
BH CV ECHO MEAS - PULM A REVS DUR: 0.08 SEC
BH CV ECHO MEAS - PULM A REVS VEL: 27.3 CM/SEC
BH CV ECHO MEAS - PULM DIAS VEL: 52.8 CM/SEC
BH CV ECHO MEAS - PULM S/D: 1.3
BH CV ECHO MEAS - PULM SYS VEL: 67.2 CM/SEC
BH CV ECHO MEAS - RAP SYSTOLE: 3 MMHG
BH CV ECHO MEAS - RV MAX PG: 1.8 MMHG
BH CV ECHO MEAS - RV MEAN PG: 1.2 MMHG
BH CV ECHO MEAS - RV V1 MAX: 67.8 CM/SEC
BH CV ECHO MEAS - RV V1 MEAN: 52.5 CM/SEC
BH CV ECHO MEAS - RV V1 VTI: 14.9 CM
BH CV ECHO MEAS - RVDD: 2.6 CM
BH CV ECHO MEAS - RVSP: 16.1 MMHG
BH CV ECHO MEAS - SI(AO): 87.5 ML/M^2
BH CV ECHO MEAS - SI(CUBED): 20.4 ML/M^2
BH CV ECHO MEAS - SI(LVOT): 30.3 ML/M^2
BH CV ECHO MEAS - SI(MOD-SP2): 23.4 ML/M^2
BH CV ECHO MEAS - SI(MOD-SP4): 24.7 ML/M^2
BH CV ECHO MEAS - SI(TEICH): 19 ML/M^2
BH CV ECHO MEAS - SV(AO): 157.3 ML
BH CV ECHO MEAS - SV(CUBED): 36.6 ML
BH CV ECHO MEAS - SV(LVOT): 54.5 ML
BH CV ECHO MEAS - SV(MOD-SP2): 42.1 ML
BH CV ECHO MEAS - SV(MOD-SP4): 44.4 ML
BH CV ECHO MEAS - SV(TEICH): 34.2 ML
BH CV ECHO MEAS - TR MAX VEL: 181 CM/SEC
BUN BLD-MCNC: 9 MG/DL (ref 8–20)
BUN/CREAT SERPL: 11.3 (ref 5.4–26.2)
CALCIUM SPEC-SCNC: 8.5 MG/DL (ref 8.9–10.3)
CHLORIDE SERPL-SCNC: 111 MMOL/L (ref 101–111)
CO2 SERPL-SCNC: 21 MMOL/L (ref 22–32)
CREAT BLD-MCNC: 0.8 MG/DL (ref 0.4–1)
DEPRECATED RDW RBC AUTO: 41.1 FL (ref 37–54)
EOSINOPHIL # BLD AUTO: 0.2 10*3/MM3 (ref 0–0.4)
EOSINOPHIL NFR BLD AUTO: 3 % (ref 0.3–6.2)
ERYTHROCYTE [DISTWIDTH] IN BLOOD BY AUTOMATED COUNT: 12.7 % (ref 12.3–15.4)
GFR SERPL CREATININE-BSD FRML MDRD: 71 ML/MIN/1.73
GLUCOSE BLD-MCNC: 102 MG/DL (ref 65–99)
HCT VFR BLD AUTO: 40.4 % (ref 34–46.6)
HGB BLD-MCNC: 13.9 G/DL (ref 12–15.9)
LYMPHOCYTES # BLD AUTO: 2 10*3/MM3 (ref 0.7–3.1)
LYMPHOCYTES NFR BLD AUTO: 31.1 % (ref 19.6–45.3)
MAXIMAL PREDICTED HEART RATE: 148 BPM
MCH RBC QN AUTO: 31.7 PG (ref 26.6–33)
MCHC RBC AUTO-ENTMCNC: 34.3 G/DL (ref 31.5–35.7)
MCV RBC AUTO: 92.2 FL (ref 79–97)
MONOCYTES # BLD AUTO: 0.7 10*3/MM3 (ref 0.1–0.9)
MONOCYTES NFR BLD AUTO: 10.8 % (ref 5–12)
NEUTROPHILS # BLD AUTO: 3.5 10*3/MM3 (ref 1.7–7)
NEUTROPHILS NFR BLD AUTO: 54.6 % (ref 42.7–76)
NRBC BLD AUTO-RTO: 0.1 /100 WBC (ref 0–0.2)
PLATELET # BLD AUTO: 139 10*3/MM3 (ref 140–450)
PMV BLD AUTO: 9.6 FL (ref 6–12)
POTASSIUM BLD-SCNC: 3.8 MMOL/L (ref 3.6–5.1)
RBC # BLD AUTO: 4.39 10*6/MM3 (ref 3.77–5.28)
SODIUM BLD-SCNC: 141 MMOL/L (ref 136–144)
STRESS TARGET HR: 126 BPM
WBC NRBC COR # BLD: 6.4 10*3/MM3 (ref 3.4–10.8)

## 2019-06-21 PROCEDURE — 99231 SBSQ HOSP IP/OBS SF/LOW 25: CPT | Performed by: PSYCHIATRY & NEUROLOGY

## 2019-06-21 PROCEDURE — 97116 GAIT TRAINING THERAPY: CPT

## 2019-06-21 PROCEDURE — 97533 SENSORY INTEGRATION: CPT

## 2019-06-21 PROCEDURE — 97166 OT EVAL MOD COMPLEX 45 MIN: CPT

## 2019-06-21 PROCEDURE — 99239 HOSP IP/OBS DSCHRG MGMT >30: CPT | Performed by: INTERNAL MEDICINE

## 2019-06-21 PROCEDURE — 85025 COMPLETE CBC W/AUTO DIFF WBC: CPT | Performed by: INTERNAL MEDICINE

## 2019-06-21 PROCEDURE — 97535 SELF CARE MNGMENT TRAINING: CPT

## 2019-06-21 PROCEDURE — 97162 PT EVAL MOD COMPLEX 30 MIN: CPT

## 2019-06-21 PROCEDURE — 80048 BASIC METABOLIC PNL TOTAL CA: CPT | Performed by: INTERNAL MEDICINE

## 2019-06-21 PROCEDURE — 99232 SBSQ HOSP IP/OBS MODERATE 35: CPT | Performed by: INTERNAL MEDICINE

## 2019-06-21 RX ORDER — ASPIRIN 81 MG/1
81 TABLET, CHEWABLE ORAL DAILY
Qty: 30 TABLET | Refills: 0 | Status: SHIPPED | OUTPATIENT
Start: 2019-06-22

## 2019-06-21 RX ADMIN — PREGABALIN 100 MG: 100 CAPSULE ORAL at 08:29

## 2019-06-21 RX ADMIN — LEVETIRACETAM 500 MG: 500 TABLET, FILM COATED ORAL at 08:29

## 2019-06-21 RX ADMIN — Medication 3 ML: at 14:49

## 2019-06-21 RX ADMIN — CLOPIDOGREL BISULFATE 75 MG: 75 TABLET ORAL at 08:29

## 2019-06-21 RX ADMIN — LEVOTHYROXINE SODIUM 25 MCG: 25 TABLET ORAL at 05:43

## 2019-06-21 RX ADMIN — PANTOPRAZOLE SODIUM 40 MG: 40 TABLET, DELAYED RELEASE ORAL at 05:43

## 2019-06-21 RX ADMIN — HYDROCHLOROTHIAZIDE 12.5 MG: 12.5 TABLET ORAL at 08:29

## 2019-06-21 RX ADMIN — SULFASALAZINE 1000 MG: 500 TABLET ORAL at 08:29

## 2019-06-21 RX ADMIN — MELATONIN 500 UNITS: at 08:32

## 2019-06-21 RX ADMIN — PREGABALIN 100 MG: 100 CAPSULE ORAL at 17:43

## 2019-06-21 RX ADMIN — ACETAMINOPHEN 650 MG: 325 TABLET, FILM COATED ORAL at 05:43

## 2019-06-21 RX ADMIN — ASPIRIN 81 MG 81 MG: 81 TABLET ORAL at 08:29

## 2019-06-21 NOTE — PROGRESS NOTES
S:   Patient is doing better.      O:  Vitals stable.  O/E the patient is awake, alert, lying in bed.  Speech is good.  CN EOMI, no facial droop. Motor 5/5. Sensory no deficits on light touch.      A:  The patient probably had a brainstem stroke.  It has improved significantly.      P:  Will continue same care.  Neuro status  Stable.  Patient is signed off.  Please call for further assistance.

## 2019-06-21 NOTE — PLAN OF CARE
Problem: Patient Care Overview  Goal: Plan of Care Review  Outcome: Ongoing (interventions implemented as appropriate)   06/21/19 5780   Coping/Psychosocial   Plan of Care Reviewed With patient   OTHER   Outcome Summary Patient states that her vision seems to be mildly improving. Patient has no other complaints at this time.    Plan of Care Review   Progress improving

## 2019-06-21 NOTE — PROGRESS NOTES
Referring Provider: Bethany Gipson MD    Reason for follow-up:  Status post CABG  Coronary artery disease  Visual symptoms-new problem-possible occipital stroke.  Bradycardia  Premature ventricular contractions.     Patient Care Team:  Keith Alston MD as PCP - General    Subjective .  Feeling better except having visual symptoms.     ROS  Possible nystagmus visual symptoms    Since I have last seen, the patient has been without any chest discomfort ,shortness of breath, palpitations, dizziness or syncope.  Denies having any headache ,abdominal pain ,nausea, vomiting , diarrhea constipation, loss of weight or loss of appetite.  Denies having any excessive bruising ,hematuria or blood in the stool.    Review of all systems negative except as indicated    History  Past Medical History:   Diagnosis Date   • Arthritis    • Atrial fibrillation (CMS/HCC)    • Bronchitis    • Chickenpox    • COPD (chronic obstructive pulmonary disease) (CMS/HCC)    • Cramps of lower extremity     bilateral with weakness   • Heart disease    • Hepatitis C 2013    treatment x 11 months   • High cholesterol    • History of degenerative disc disease    • HLD (hyperlipidemia)    • HTN (hypertension)    • Hypothyroidism    • Measles    • Seizure disorder (CMS/HCC)    • Skin melanoma (CMS/HCC) 2013    s/p lymph node removal on the right   • Sleep apnea    • Stroke (CMS/HCC)    • Whooping cough        Past Surgical History:   Procedure Laterality Date   • APPENDECTOMY     • AXILLARY NODE DISSECTION Right 05/2014   • CARDIAC CATHETERIZATION N/A 6/19/2019    Procedure: Left Heart Cath;  Surgeon: Real Winn MD;  Location: [facility] CATH INVASIVE LOCATION;  Service: Cardiovascular   • CARDIAC CATHETERIZATION N/A 6/19/2019    Procedure: Saphenous Vein Graft;  Surgeon: Real Winn MD;  Location: [facility] CATH INVASIVE LOCATION;  Service: Cardiovascular   • CARDIAC CATHETERIZATION N/A 6/19/2019    Procedure: Left ventriculography;   Surgeon: Real Winn MD;  Location: [facility] CATH INVASIVE LOCATION;  Service: Cardiovascular   • CARDIAC CATHETERIZATION N/A 6/19/2019    Procedure: Coronary angiography;  Surgeon: Real Winn MD;  Location: [facility] CATH INVASIVE LOCATION;  Service: Cardiovascular   • CATARACT EXTRACTION  01/2017    x 2   • CHOLECYSTECTOMY  2004   • COLONOSCOPY  04/05/2017   • CORONARY ANGIOPLASTY WITH STENT PLACEMENT      stent placement after CABG   • CORONARY ARTERY BYPASS GRAFT  01/20/2010   • CORONARY ARTERY BYPASS GRAFT     • FEMORAL POPLITEAL BYPASS Right 07/20/2017    Dr. Palmer   • INCISIONAL HERNIA REPAIR  09/28/2016    lap. Dr. West   • MOLE REMOVAL Right     arm   • OTHER SURGICAL HISTORY      laser surgery X 2   • PARTIAL HYSTERECTOMY     • THYROIDECTOMY, PARTIAL      goiter removed 1/2 thyroid       Family History   Problem Relation Age of Onset   • Hypertension Mother    • Cancer Mother    • Gout Mother    • Asthma Mother    • Cancer Father    • Heart disease Brother    • Diabetes Other    • Cancer Other    • Tuberculosis Other        Social History     Tobacco Use   • Smoking status: Former Smoker     Last attempt to quit: 6/17/2009     Years since quitting: 10.0   Substance Use Topics   • Alcohol use: No     Frequency: Never   • Drug use: No        Medications Prior to Admission   Medication Sig Dispense Refill Last Dose   • amLODIPine (NORVASC) 5 MG tablet Take 5 mg by mouth Daily.   6/17/2019 at Unknown time   • atorvastatin (LIPITOR) 10 MG tablet Take 20 mg by mouth Every Night.   6/16/2019 at Unknown time   • Cholecalciferol 64307 units tablet Take 2,000 Units by mouth 2 (Two) Times a Day.   6/17/2019 at Unknown time   • clopidogrel (PLAVIX) 75 MG tablet Take 75 mg by mouth Daily.   6/17/2019 at Unknown time   • ezetimibe (ZETIA) 10 MG tablet Take 10 mg by mouth Daily.   6/17/2019 at Unknown time   • hydrochlorothiazide (HYDRODIURIL) 25 MG tablet Take 12.5 mg by mouth Daily.   6/17/2019 at Unknown  time   • levETIRAcetam (KEPPRA) 500 MG tablet Take 500 mg by mouth 2 (Two) Times a Day.   6/17/2019 at Unknown time   • levothyroxine (SYNTHROID, LEVOTHROID) 25 MCG tablet Take 25 mcg by mouth Daily.   6/17/2019 at Unknown time   • lisinopril (PRINIVIL,ZESTRIL) 20 MG tablet Take 20 mg by mouth Daily.   6/17/2019 at Unknown time   • metoprolol succinate XL (TOPROL-XL) 100 MG 24 hr tablet Take 100 mg by mouth Daily.   6/17/2019 at Unknown time   • pantoprazole (PROTONIX) 20 MG EC tablet Take 20 mg by mouth Daily.   6/17/2019 at Unknown time   • pregabalin (LYRICA) 100 MG capsule Take 100 mg by mouth 3 (Three) Times a Day.   6/17/2019 at Unknown time   • sulfaSALAzine (AZULFIDINE) 500 MG tablet Take 1,000 mg by mouth 2 (Two) Times a Day.   6/17/2019 at Unknown time   • tiZANidine (ZANAFLEX) 4 MG tablet Take 2 mg by mouth At Night As Needed for Muscle Spasms.   6/16/2019 at Unknown time       Allergies  Penicillins    Scheduled Meds:    aspirin 81 mg Oral Daily   atorvastatin 20 mg Oral Nightly   cholecalciferol 500 Units Oral Daily   clopidogrel 75 mg Oral Daily   cyanocobalamin 1,000 mcg Intramuscular Q28 Days   hydrochlorothiazide 12.5 mg Oral Daily   levETIRAcetam 500 mg Oral Q12H   levothyroxine 25 mcg Oral Q AM   pantoprazole 40 mg Oral Q AM   pregabalin 100 mg Oral TID   sodium chloride 3 mL Intravenous Q12H   sulfaSALAzine 1,000 mg Oral BID     Continuous Infusions:    nitroglycerin 10-50 mcg/min Last Rate: Stopped (06/21/19 0340)     PRN Meds:.•  acetaminophen  •  acetaminophen  •  atropine  •  diphenhydrAMINE  •  melatonin  •  nitroglycerin  •  ondansetron **OR** ondansetron  •  [COMPLETED] Insert peripheral IV **AND** sodium chloride  •  sodium chloride  •  sodium chloride  •  tiZANidine    Objective     VITAL SIGNS  Vitals:    06/21/19 0302 06/21/19 0622 06/21/19 1009 06/21/19 1440   BP: 139/79 148/71     BP Location: Left arm Left arm     Patient Position: Lying Lying     Pulse: 74 54     Resp: 15 16 16 18  "  Temp: 98 °F (36.7 °C) 97.3 °F (36.3 °C) 97.5 °F (36.4 °C) 97.7 °F (36.5 °C)   TempSrc: Oral Oral Oral Oral   SpO2: 93% 96%     Weight:  78.1 kg (172 lb 2.9 oz)     Height:           Flowsheet Rows      First Filed Value   Admission Height  157.5 cm (62\") Documented at 06/17/2019 1250   Admission Weight  78.5 kg (173 lb 1 oz) Documented at 06/17/2019 1250           TELEMETRY: Sinus rhythm premature ventricular contractions.    Physical Exam:  The patient is alert, oriented and in no distress.  Vital signs as noted above.  Head and neck revealed no carotid bruits or jugular venous distention.  No thyromegaly or lymphadenopathy is present  Lungs clear.  No wheezing.  Breath sounds are normal bilaterally.  Heart normal first and second heart sounds.  No murmur. No precordial rub is present.  No gallop is present.  Abdomen soft and nontender.  No organomegaly is present.  Extremities with good peripheral pulses without any pedal edema.  Skin warm and dry.  Musculoskeletal system is grossly normal  CNS grossly normal.  Patient has visual symptoms although she has motor function well preserved.  Possible diplopia.  Possible occipital      Results Review:   I reviewed the patient's new clinical results.  Lab Results (last 24 hours)     Procedure Component Value Units Date/Time    CBC & Differential [[id number]] Collected:  06/21/19 0529    Specimen:  Blood Updated:  06/21/19 0550    Narrative:       The following orders were created for panel order CBC & Differential.  Procedure                               Abnormality         Status                     ---------                               -----------         ------                     CBC Auto Differential[[id number]]        Abnormal            Final result                 Please view results for these tests on the individual orders.    CBC Auto Differential [[id number]]  (Abnormal) Collected:  06/21/19 0529    Specimen:  Blood Updated:  06/21/19 0550     WBC 6.40 " 10*3/mm3      RBC 4.39 10*6/mm3      Hemoglobin 13.9 g/dL      Hematocrit 40.4 %      MCV 92.2 fL      MCH 31.7 pg      MCHC 34.3 g/dL      RDW 12.7 %      RDW-SD 41.1 fl      MPV 9.6 fL      Platelets 139 10*3/mm3      Neutrophil % 54.6 %      Lymphocyte % 31.1 %      Monocyte % 10.8 %      Eosinophil % 3.0 %      Basophil % 0.5 %      Neutrophils, Absolute 3.50 10*3/mm3      Lymphocytes, Absolute 2.00 10*3/mm3      Monocytes, Absolute 0.70 10*3/mm3      Eosinophils, Absolute 0.20 10*3/mm3      Basophils, Absolute 0.00 10*3/mm3      nRBC 0.1 /100 WBC     Basic Metabolic Panel [[id number]]  (Abnormal) Collected:  06/21/19 0135    Specimen:  Blood Updated:  06/21/19 0258     Glucose 102 mg/dL      BUN 9 mg/dL      Creatinine 0.80 mg/dL      Sodium 141 mmol/L      Potassium 3.8 mmol/L      Chloride 111 mmol/L      CO2 21.0 mmol/L      Calcium 8.5 mg/dL      eGFR Non African Amer 71 mL/min/1.73      BUN/Creatinine Ratio 11.3     Anion Gap 9.0 mmol/L     Narrative:       The MDRD GFR formula is only valid for adults with stable renal function between ages 18 and 70.          Imaging Results (last 24 hours)     ** No results found for the last 24 hours. **          EKG      I personally viewed and interpreted the patient's EKG/Telemetry data:    ECHOCARDIOGRAM:    Results for orders placed during the hospital encounter of 06/17/19   Adult Transthoracic Echo Complete W/ Cont if Necessary Per Protocol    Narrative Technically satisfactory study.  Mitral valve is thickened with adequate opening motion.  Aortic valve is normal.  Tricuspid valve is normal.  Left atrium is normal in size.  Left ventricle is normal in size and contractility with ejection fraction   of 60%.  Atrial septum is intact.  No pericardial effusion or intracardiac thrombus is seen.  The right atrium and right ventricle are normal.  Aorta is normal.    Impression  Thickened mitral valve with adequate opening motion.  Normal left ventricle size and  contractility with ejection fraction of   60%.           STRESS MYOVIEW:    Cardiolite (Tc-99m Sestamibi) stress test    CARDIAC CATHETERIZATION:          OTHER:         Assessment/Plan  /////////////////  impression  ---------------------------  -Chest discomfort suggestive of unstable angina factors.  EKG showed no acute changes except PVCs.    Troponin levels are negative     -Status post CABG 01/20/2010 at [facility].  Status post stent placement after CABG ( details not available    )Cardiac catheterization and coronary angiography 6/19/2019 revealed  Left ventricle size and contractility is normal with proximal inferior wall hypokinesis.  Ejection fraction is 55%.     Left main artery is normal.  Left anterior descending artery is noted in the midsegment with filling of the LAD through LIMA.  Circumflex coronary artery is normal  Right coronary artery is a very small caliber vessel with 99% disease in the midsegment with filling of the distal vessel through collaterals.     RAUSCH to LAD is patent  SVG to marginal branch is patent  SVG to RCA is totally occluded.       Echocardiogram 11/13/2017 revealed mild mitral regurgitation and normal left ventricular function.  Carri scan Cardiolite test is negative for myocardial ischemia 11/13/2017     -Intermittent atrial fibrillation.  Patient is maintaining sinus rhythm.       -History of  stroke from which patient has recovered.      -Hypertension dyslipidemia and hypothyroidism      -Status post thyroidectomy for cyst cholecystectomy and hysterectomy.      -History of hepatitis-C      -Allergy to penicillin      -Former smoker quit smoking October 2013.      -Status post  Malignant melanoma removal from right elbow area.  Patient did not need chemotherapy     -history of peripheral vascular disease.  CTA 06/30/2017 revealed right superficial femoral artery occlusion  ---------------.  Plan  ----------------------  Cardiac catheterization results 6/19/2019-as  described above.  Patient had visual symptoms.  Neurological consultation is appreciated.  Possible occipital stroke although no definite documentation is available at this time.  Visual symptoms are better.  EKG showed sinus rhythm premature ventricle contraction nonspecific ST- T changes normal axis  Patient is off nitroglycerin.  Continue Plavix.  Follow neurological status closely.  Further management on patient's progress.  Okay to discharge plans.  Follow-up in office in 2 weeks.      Active Problems:    Ventricular bigeminy    Bradycardia with 51-60 beats per minute    Chest pain in adult    Real Winn MD  06/21/19  5:45 PM

## 2019-06-21 NOTE — PLAN OF CARE
Problem: Patient Care Overview  Goal: Plan of Care Review   06/21/19 1012   Coping/Psychosocial   Plan of Care Reviewed With patient   OTHER   Outcome Summary Pt showing improved function & act. hussain. 1-2 hours after getting up in the a.m. w/ PT. Able to walk w/ RW & supervision to the bathroom for 15 minute ADL primarily in standing. Min (A) needed to wash hair over sink & distant supervision provided otherwise for safety & to assess ROM, executive functioning & visual/motor integration. Pt is expected to improve well enough functionallly to go home w/ HHC at d/c to address her limited functional endurance.   Plan of Care Review   Progress improving

## 2019-06-21 NOTE — PLAN OF CARE
Problem: Patient Care Overview  Goal: Plan of Care Review  Outcome: Ongoing (interventions implemented as appropriate)   06/21/19 2391   Coping/Psychosocial   Plan of Care Reviewed With patient   OTHER   Outcome Summary Pt 72 yo female admitting with dizziness, chest pain and bradycardia. Pt had cardiac cath on 6/19/19 and then experienced double vision so code stroke called. MRI (-) definitive findings of acute infarct. Pt able to ambulate 70' with RW before reporting legs were sore and getting SOA. PT recommends home with daughter and HHPT to increase endurance and mobility for safety.

## 2019-06-21 NOTE — THERAPY EVALUATION
Acute Care - Physical Therapy Initial Evaluation  [facility]     Patient Name: Delphine Rob  : 1945  MRN: [id number]  Today's Date: 2019                Admit Date: 2019    Visit Dx:     ICD-10-CM ICD-9-CM   1. Chest pain in adult R07.9 786.50   2. Ventricular bigeminy I49.9 427.89   3. Bradycardia with 51-60 beats per minute R00.1 427.89     Patient Active Problem List   Diagnosis   • Chest pain in adult   • Ventricular bigeminy   • Bradycardia with 51-60 beats per minute     Past Medical History:   Diagnosis Date   • Arthritis    • Atrial fibrillation (CMS/HCC)    • Bronchitis    • Chickenpox    • COPD (chronic obstructive pulmonary disease) (CMS/HCC)    • Cramps of lower extremity     bilateral with weakness   • Heart disease    • Hepatitis C 2013    treatment x 11 months   • High cholesterol    • History of degenerative disc disease    • HLD (hyperlipidemia)    • HTN (hypertension)    • Hypothyroidism    • Measles    • Seizure disorder (CMS/HCC)    • Skin melanoma (CMS/HCC) 2013    s/p lymph node removal on the right   • Sleep apnea    • Stroke (CMS/HCC)    • Whooping cough      Past Surgical History:   Procedure Laterality Date   • APPENDECTOMY     • AXILLARY NODE DISSECTION Right 2014   • CARDIAC CATHETERIZATION N/A 2019    Procedure: Left Heart Cath;  Surgeon: Real Winn MD;  Location: [facility] INVASIVE LOCATION;  Service: Cardiovascular   • CARDIAC CATHETERIZATION N/A 2019    Procedure: Saphenous Vein Graft;  Surgeon: Real Winn MD;  Location: [facility] CATH INVASIVE LOCATION;  Service: Cardiovascular   • CARDIAC CATHETERIZATION N/A 2019    Procedure: Left ventriculography;  Surgeon: Real Winn MD;  Location: [facility] CATH INVASIVE LOCATION;  Service: Cardiovascular   • CARDIAC CATHETERIZATION N/A 2019    Procedure: Coronary angiography;  Surgeon: Real Winn MD;  Location: [facility] CATH INVASIVE LOCATION;  Service: Cardiovascular   • CATARACT  EXTRACTION  01/2017    x 2   • CHOLECYSTECTOMY  2004   • COLONOSCOPY  04/05/2017   • CORONARY ANGIOPLASTY WITH STENT PLACEMENT      stent placement after CABG   • CORONARY ARTERY BYPASS GRAFT  01/20/2010   • CORONARY ARTERY BYPASS GRAFT     • FEMORAL POPLITEAL BYPASS Right 07/20/2017    Dr. Palmer   • INCISIONAL HERNIA REPAIR  09/28/2016    lap. Dr. West   • MOLE REMOVAL Right     arm   • OTHER SURGICAL HISTORY      laser surgery X 2   • PARTIAL HYSTERECTOMY     • THYROIDECTOMY, PARTIAL      goiter removed 1/2 thyroid        PT ASSESSMENT (last 12 hours)      Physical Therapy Evaluation     Row Name 06/21/19 0817          PT Evaluation Time/Intention    Document Type  evaluation  -HD (r) AB (t) HD (c)     Mode of Treatment  physical therapy  -HD (r) AB (t) HD (c)     Row Name 06/21/19 0869          General Information    Prior Level of Function  independent:;all household mobility  -HD (r) AB (t) HD (c)     Equipment Currently Used at Home  cane, straight;rollator;wheelchair  -HD (r) AB (t) HD (c)     Pertinent History of Current Functional Problem  72 yo female admitting with SOA, dizziness, bradycardia and bigeminy. Troponin levels normal. EKG showed mild mitral regergitation but normal L ventricular function. Pt s/p cardiac cath on 6/19/19. Following cardiac cath pt experienced blurry vision so code stroke called. CT head (-) acute changes but prior lacunar infarct of L basal ganglia. CTA (-) for significant stenosis. MRI brain (-) definitive findings of acute infarct.  -HD (r) AB (t) HD (c)     Row Name 06/21/19 0840          Relationship/Environment    Lives With  child(susi), adult  -HD (r) AB (t) HD (c)     Row Name 06/21/19 0872          Resource/Environmental Concerns    Current Living Arrangements  home/apartment/condo pt lives in mobile home  -HD (r) AB (t) HD (c)     Row Name 06/21/19 0845          Home Main Entrance    Number of Stairs, Main Entrance  five  -HD (r) AB (t) HD (c)     Row Name 06/21/19  0845          Cognitive Assessment/Intervention- PT/OT    Orientation Status (Cognition)  oriented x 4  -HD (r) AB (t) HD (c)     Follows Commands (Cognition)  WNL  -HD (r) AB (t) HD (c)     Row Name 06/21/19 0845          Bed Mobility Assessment/Treatment    Bed Mobility Assessment/Treatment  bed mobility (all) activities  -HD (r) AB (t) HD (c)     [place] Level (Bed Mobility)  minimum assist (75% patient effort)  -HD (r) AB (t) HD (c)     Assistive Device (Bed Mobility)  bed rails  -HD (r) AB (t) HD (c)     Row Name 06/21/19 0845          Transfer Assessment/Treatment    Transfer Assessment/Treatment  sit-stand transfer;stand-sit transfer  -HD (r) AB (t) HD (c)     Sit-Stand [place] (Transfers)  minimum assist (75% patient effort)  -HD (r) AB (t) HD (c)     Stand-Sit [place] (Transfers)  minimum assist (75% patient effort)  -HD (r) AB (t) HD (c)     Row Name 06/21/19 0845          Sit-Stand Transfer    Assistive Device (Sit-Stand Transfers)  walker, front-wheeled  -HD (r) AB (t) HD (c)     Row Name 06/21/19 0845          Stand-Sit Transfer    Assistive Device (Stand-Sit Transfers)  walker, front-wheeled  -HD (r) AB (t) HD (c)     Row Name 06/21/19 0845          Gait/Stairs Assessment/Training    [place] Level (Gait)  contact guard  -HD (r) AB (t) HD (c)     Assistive Device (Gait)  walker, front-wheeled  -HD (r) AB (t) HD (c)     Distance in Feet (Gait)  70  -HD (r) AB (t) HD (c)     Pattern (Gait)  step-through  -HD (r) AB (t) HD (c)     Deviations/Abnormal Patterns (Gait)  base of support, narrow;kadie decreased;gait speed decreased decreased heel strike b/l  -HD (r) AB (t) HD (c)     Row Name 06/21/19 0845          General ROM    GENERAL ROM COMMENTS  UE and LE ROM WFL  -HD (r) AB (t) HD (c)     Row Name 06/21/19 0845          MMT (Manual Muscle Testing)    General MMT Comments  Grossly assessed UE 4/5 MMT, LE 4/5 MMT except R DF 4-/5 pt reports weakness is from prior CVA  -HD (r) AB (t)  HD (c)     Row Name 06/21/19 0845          Vision Assessment/Intervention    Vision Assessment Comment  Pt reporting double vision. OT gave pt eye patch to wear to decrease double vision.   -HD (r) AB (t) HD (c)     Row Name             Wound 06/19/19 1915 Right anterior greater trochanter surgical;incision    Wound - Properties Group Date first assessed: 06/19/19  -KK Time first assessed: 1915  -KK Side: Right  -KK Orientation: anterior  -KK Location: greater trochanter  -KK Type: surgical;incision  -KK    Row Name 06/21/19 0845          Plan of Care Review    Plan of Care Reviewed With  patient  -HD (r) AB (t) HD (c)     Row Name 06/21/19 0826          Physical Therapy Clinical Impression    Patient/Family Goals Statement (PT Clinical Impression)  Pt stated she wanted to be able to return home with daughter.   -HD (r) AB (t) HD (c)     Criteria for Skilled Interventions Met (PT Clinical Impression)  treatment indicated;yes  -HD (r) AB (t) HD (c)     Impairments Found (describe specific impairments)  aerobic capacity/endurance;gait, locomotion, and balance;posture  -HD (r) AB (t) HD (c)     Rehab Potential (PT Clinical Summary)  good, to achieve stated therapy goals  -HD (r) AB (t) HD (c)     Predicted Duration of Therapy (PT)  2 weeks  -HD (r) AB (t) HD (c)     Row Name 06/21/19 0845          Vital Signs    Pretreatment Heart Rate (beats/min)  72  -HD (r) AB (t) HD (c)     Intratreatment Heart Rate (beats/min)  94  -HD (r) AB (t) HD (c)     Posttreatment Heart Rate (beats/min)  76  -HD (r) AB (t) HD (c)     Post SpO2 (%)  97  -HD (r) AB (t) HD (c)     O2 Delivery Post Treatment  room air  -HD (r) AB (t) HD (c)     Row Name 06/21/19 0874          Physical Therapy Goals    Bed Mobility Goal Selection (PT)  bed mobility, PT goal 1  -HD (r) AB (t) HD (c)     Transfer Goal Selection (PT)  transfer, PT goal 1  -HD (r) AB (t) HD (c)     Gait Training Goal Selection (PT)  gait training, PT goal 1  -HD (r) AB (t) HD (c)      Row Name 06/21/19 0845          Bed Mobility Goal 1 (PT)    Activity/Assistive Device (Bed Mobility Goal 1, PT)  bed mobility activities, all  -HD (r) AB (t) HD (c)     [place] Level/Cues Needed (Bed Mobility Goal 1, PT)  conditional independence  -HD (r) AB (t) HD (c)     Time Frame (Bed Mobility Goal 1, PT)  2 weeks  -HD (r) AB (t) HD (c)     Row Name 06/21/19 0845          Transfer Goal 1 (PT)    Activity/Assistive Device (Transfer Goal 1, PT)  transfers, all  -HD (r) AB (t) HD (c)     [place] Level/Cues Needed (Transfer Goal 1, PT)  conditional independence  -HD (r) AB (t) HD (c)     Time Frame (Transfer Goal 1, PT)  2 weeks  -HD (r) AB (t) HD (c)     Row Name 06/21/19 0845          Gait Training Goal 1 (PT)    Activity/Assistive Device (Gait Training Goal 1, PT)  gait (walking locomotion);assistive device use;walker, rolling  -HD (r) AB (t) HD (c)     [place] Level (Gait Training Goal 1, PT)  conditional independence  -HD (r) AB (t) HD (c)     Distance (Gait Goal 1, PT)  150  -HD (r) AB (t) HD (c)     Time Frame (Gait Training Goal 1, PT)  2 weeks  -HD (r) AB (t) HD (c)     Row Name 06/21/19 0845          Positioning and Restraints    Pre-Treatment Position  in bed  -HD (r) AB (t) HD (c)     Post Treatment Position  chair  -HD (r) AB (t) HD (c)     In Chair  notified nsg;reclined;call light within reach;encouraged to call for assist;exit alarm on  -HD (r) AB (t) HD (c)     Row Name 06/21/19 0845          Living Environment    Home Accessibility  stairs to enter home  -HD (r) AB (t) HD (c)       User Key  (r) = Recorded By, (t) = Taken By, (c) = Cosigned By    Initials Name Provider Type    HD Teri Alcantar, PT Physical Therapist    Xochitl Bolaños, RN Registered Nurse    Bettye Kyle, BLANCA Student PT Student        Physical Therapy Education     Title: PT OT SLP Therapies (In Progress)     Topic: Physical Therapy (In Progress)     Point: Mobility training (Done)     Learning Progress  Summary           Patient Acceptance, E, VU by AB at 6/21/2019  9:32 AM                               User Key     Initials Effective Dates Name Provider Type Discipline    AB 06/01/19 -  Bettye Leonardo, PT Student PT Student PT              PT Recommendation and Plan  Anticipated Discharge Disposition (PT): home with home health, home with assist  Planned Therapy Interventions (PT Eval): balance training, bed mobility training, gait training, home exercise program  Therapy Frequency (PT Clinical Impression): 3 times/wk  Outcome Summary/Treatment Plan (PT)  Anticipated Discharge Disposition (PT): home with home health, home with assist  Plan of Care Reviewed With: patient  Outcome Summary: Pt 74 yo female admitting with dizziness, chest pain and bradycardia. Pt had cardiac cath on 6/19/19. Pt able to ambulate 70' before reporting legs were sore and getting SOA. PT recommends home with daughter and HHPT to work on endurance and mobility for increased safety and independence.   Outcome Measures     Row Name 06/21/19 1000 06/21/19 0845          How much help from another is currently needed...    Putting on and taking off regular lower body clothing?  4  -MH  --     Bathing (including washing, rinsing, and drying)  3  -MH  --     Toileting (which includes using toilet bed pan or urinal)  4  -MH  --     Putting on and taking off regular upper body clothing  4  -MH  --     Taking care of personal grooming (such as brushing teeth)  4  -MH  --     Eating meals  4  -MH  --     Score  23  -MH  --        Modified [place] Scale    [place] Scale  --  4 - Moderately severe disability.  Unable to walk without assistance, and unable to attend to own bodily needs without assistance.  -HD (r) AB (t) HD (c)        Functional Assessment    Outcome Measure Options  AM-PAC 6 Clicks Daily Activity (OT)  -MH  Modified [place]  -HD (r) AB (t) HD (c)       User Key  (r) = Recorded By, (t) = Taken By, (c) = Cosigned By    Initials Name  Provider Type     Meli Ley, OT Occupational Therapist    Teri Quintana, PT Physical Therapist    Bettye Kyle, PT Student PT Student         Time Calculation:   PT Charges     Row Name 06/21/19 1447 06/21/19 0936          Time Calculation    Start Time  0845  -HD (r) AB (t) HD (c)  0845  -HD (r) AB (t) HD (c)     Stop Time  0906  -HD (r) AB (t) HD (c)  0906  -HD (r) AB (t) HD (c)     Time Calculation (min)  21 min  -HD (r) AB (t)  21 min  -HD (r) AB (t)     PT Received On  06/21/19  -HD (r) AB (t) HD (c)  06/21/19  -HD (r) AB (t) HD (c)     PT - Next Appointment  06/22/19  -HD (r) AB (t) HD (c)  06/24/19  -HD (r) AB (t) HD (c)     PT Goal Re-Cert Due Date  07/05/19  -HD (r) AB (t) HD (c)  07/05/19  -HD (r) AB (t) HD (c)        Time Calculation- PT    Total Timed Code Minutes- PT  21 minute(s)  -HD (r) AB (t) HD (c)  21 minute(s)  -HD (r) AB (t) HD (c)       User Key  (r) = Recorded By, (t) = Taken By, (c) = Cosigned By    Initials Name Provider Type    Teri Quintana, PT Physical Therapist    Bettye Kyle, PT Student PT Student        Therapy Charges for Today     Code Description Service Date Service Provider Modifiers Qty    [id number] HC GAIT TRAINING EA 15 MIN 6/21/2019 Bettye Leonardo, PT Student GP 1    [id number] HC PT EVAL MOD COMPLEXITY 3 6/21/2019 Bettye Leonardo, PT Student GP 1          PT G-Codes  Outcome Measure Options: AM-PAC 6 Clicks Daily Activity (OT)  Score: 23  Modified [place] Scale: 4 - Moderately severe disability.  Unable to walk without assistance, and unable to attend to own bodily needs without assistance.      Bettye Leonardo PT Student  6/21/2019

## 2019-06-21 NOTE — DISCHARGE SUMMARY
Date of Admission: 6/17/2019    Date of Discharge:  6/21/2019    Length of stay:  LOS: 1 day     Discharge Diagnosis:     Bradycardia, bigeminy on EKG   - hold metoprolol  - Cardiology following for ventricular bigeminy, bradycardia, chest pain     Chest pain  - serial troponins negative x3 - ruled out for ACS  - s/p cardiac cath 06/19/2019 revealed Left ventricle size and contractility is normal with proximal inferior wall hypokinesis.  Ejection fraction is 55%.Left main artery is normal. Left anterior descending artery is noted in the midsegment with filling of the LAD through LIMA. Circumflex coronary artery is normal. Right coronary artery is a very small caliber vessel with 99% disease in the midsegment with filling of the distal vessel through collaterals.  RAUSCH to LAD is patent. SVG to marginal branch is patent. SVG to RCA is totally occluded.    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    - added Vit B12    Dizziness/weakness  - secondary to above  - falls precautions     CAD s/p CABG 4v  - cont home aspirin, plavix, statin     PVD s/p right fem-pop bypass  - on plavix, statin as above     Hypertension  - hold meds due to borderline bp and on nitro drip  - discontinued on discharge     Seizures  - cont home keppra     Hypothyroidism  - check tsh and cont home synthroid     Arthritis  - cont home lyrica    Presenting Problem/History of Present Illness  Active Hospital Problems    Diagnosis  POA   • Ventricular bigeminy [I49.9]  Unknown      Resolved Hospital Problems    Diagnosis Date Resolved POA   • Chest pain in adult [R07.9] 06/21/2019 Yes   • Bradycardia with 51-60 beats per minute [R00.1] 06/21/2019 Unknown          Hospital Course  Patient is a 73 y.o. female presented with dizziness, palpitations, chest pains, headache, and nausea.  Her chest pain was a dull aching pain under her left breast.  She has also had some associated  shortness of breath, but that has since resolved.  She was still having some palpitations during admission.    She was evaluated by Cardiology and had serial troponins that were negative x4, ruling out ACS.  She underwent a cardiac catheterization on 06/192019 (details are noted above) but no intervention was required.  Cardiology recommended continued medical management and monitoring for any ventricular dysrhythmia.  Later the same day, however, a code stroke was called and the patient was seen by Neurology.  She had developed acute nystagmus, double vision, and numbness of the left side of her body.  She had no speech or swallowing difficulties. Emergent CT head and CTA of the head and neck was all negative.  MRI brain was also unremarkable.  Neurology thought the patient may have had a brainstem stroke, but she was outside the window for tPA.  Her symptoms all resolved prior to admission, although she did need some help with mobility.  Per PT evaluation, inpatient rehab was not needed.  She was discharged home with family and home health for outpatient PT to be continued.      Past Medical History:     Past Medical History:   Diagnosis Date   • Arthritis    • Atrial fibrillation (CMS/HCC)    • Bronchitis    • Chickenpox    • COPD (chronic obstructive pulmonary disease) (CMS/HCC)    • Cramps of lower extremity     bilateral with weakness   • Heart disease    • Hepatitis C 2013    treatment x 11 months   • High cholesterol    • History of degenerative disc disease    • HLD (hyperlipidemia)    • HTN (hypertension)    • Hypothyroidism    • Measles    • Seizure disorder (CMS/HCC)    • Skin melanoma (CMS/HCC) 2013    s/p lymph node removal on the right   • Sleep apnea    • Stroke (CMS/HCC)    • Whooping cough        Past Surgical History:     Past Surgical History:   Procedure Laterality Date   • APPENDECTOMY     • AXILLARY NODE DISSECTION Right 05/2014   • CARDIAC CATHETERIZATION N/A 6/19/2019    Procedure: Left  Heart Cath;  Surgeon: Real Winn MD;  Location: [facility] CATH INVASIVE LOCATION;  Service: Cardiovascular   • CARDIAC CATHETERIZATION N/A 6/19/2019    Procedure: Saphenous Vein Graft;  Surgeon: Real Winn MD;  Location:  [facility] CATH INVASIVE LOCATION;  Service: Cardiovascular   • CARDIAC CATHETERIZATION N/A 6/19/2019    Procedure: Left ventriculography;  Surgeon: Real Winn MD;  Location:  [facility] CATH INVASIVE LOCATION;  Service: Cardiovascular   • CARDIAC CATHETERIZATION N/A 6/19/2019    Procedure: Coronary angiography;  Surgeon: Real Winn MD;  Location: [facility] CATH INVASIVE LOCATION;  Service: Cardiovascular   • CATARACT EXTRACTION  01/2017    x 2   • CHOLECYSTECTOMY  2004   • COLONOSCOPY  04/05/2017   • CORONARY ANGIOPLASTY WITH STENT PLACEMENT      stent placement after CABG   • CORONARY ARTERY BYPASS GRAFT  01/20/2010   • CORONARY ARTERY BYPASS GRAFT     • FEMORAL POPLITEAL BYPASS Right 07/20/2017    Dr. Palmer   • INCISIONAL HERNIA REPAIR  09/28/2016    lap. Dr. West   • MOLE REMOVAL Right     arm   • OTHER SURGICAL HISTORY      laser surgery X 2   • PARTIAL HYSTERECTOMY     • THYROIDECTOMY, PARTIAL      goiter removed 1/2 thyroid       Social History:   Social History     Socioeconomic History   • Marital status:      Spouse name: Not on file   • Number of children: Not on file   • Years of education: Not on file   • Highest education level: Not on file   Tobacco Use   • Smoking status: Former Smoker     Last attempt to quit: 6/17/2009     Years since quitting: 10.0   Substance and Sexual Activity   • Alcohol use: No     Frequency: Never   • Drug use: No       Procedures Performed    Procedure(s):  Left Heart Cath  Saphenous Vein Graft  Left ventriculography  Coronary angiography  -------------------  06/19 1140 Note By: Real Winn MD    Consults:   Consults     Date and Time Order Name Status Description    6/19/2019 1930 Inpatient Ophthalmology Consult       6/17/2019 1952 Inpatient Cardiology Consult      6/17/2019 1553 Hospitalist (on-call MD unless specified) Completed           Pertinent Test Results:     Results from last 7 days   Lab Units 06/21/19  0529 06/20/19  0215 06/19/19  1739 06/19/19  0113 06/18/19  1838 06/17/19  1334   WBC 10*3/mm3 6.40 6.00 6.80 5.40  --  6.30   HEMOGLOBIN g/dL 13.9 14.3 15.5 14.1  --  14.2   HEMATOCRIT % 40.4 42.7 46.1 42.7  --  42.0   PLATELETS 10*3/mm3 139* 151 159 142  --  163   INR   --   --  1.04 1.06 1.09 1.08     Results from last 7 days   Lab Units 06/21/19  0135 06/20/19  0215 06/19/19  1729 06/19/19  0113 06/18/19  0552 06/18/19  0551 06/17/19  2323 06/17/19  1911 06/17/19  1334   SODIUM mmol/L 141 140 139 137  --  141  --   --  137   POTASSIUM mmol/L 3.8 4.3 3.6 3.3*  --  4.4  --   --  4.4   CHLORIDE mmol/L 111 107 107 106  --  112*  --   --  105   CO2 mmol/L 21.0* 25.0 23.0 23.0  --  15.0*  --   --  23.0   BUN mg/dL 9 8 7* 9  --  14  --   --  15   CREATININE mg/dL 0.80 0.90 0.90 1.00  --  1.20*  --   --  1.10*   GLUCOSE mg/dL 102* 102* 113* 114*  --  101*  --   --  82   CALCIUM mg/dL 8.5* 8.8* 9.0 8.5*  --  8.7*  --   --  9.2   ALT (SGPT) U/L  --   --   --   --   --   --   --   --  13*   AST (SGOT) U/L  --   --   --   --   --   --   --   --  28   TROPONIN I ng/mL  --   --   --   --  <0.030  --  <0.030 <0.030 <0.030       Microbiology Results Abnormal     None        Mri Brain Without Contrast    Result Date: 6/20/2019  1.No definite findings of acute infarction or other acute intracranial abnormality at this time. 2.Mild volume loss and mild to moderate small vessel ischemic disease with suspected remote lacunar infarcts, as before.   Electronically Signed By-Logan Rodriguez On:6/20/2019 10:43 AM This report was finalized on [id number] by  Logan Rodriguez, .    Results for orders placed during the hospital encounter of 06/17/19   Adult Transthoracic Echo Complete W/ Cont if Necessary Per Protocol    Narrative Technically  satisfactory study.  Mitral valve is thickened with adequate opening motion.  Aortic valve is normal.  Tricuspid valve is normal.  Left atrium is normal in size.  Left ventricle is normal in size and contractility with ejection fraction   of 60%.  Atrial septum is intact.  No pericardial effusion or intracardiac thrombus is seen.  The right atrium and right ventricle are normal.  Aorta is normal.    Impression  Thickened mitral valve with adequate opening motion.  Normal left ventricle size and contractility with ejection fraction of   60%.         Condition on Discharge:  good    Vital Signs  Temp:  [97.3 °F (36.3 °C)-98 °F (36.7 °C)] 97.3 °F (36.3 °C)  Heart Rate:  [39-85] 54  Resp:  [15-19] 19  BP: (114-156)/(59-85) 148/71    Physical Exam:  General: well-developed and well-nourished, NAD  Head: Normocephalic and atraumatic. Heart: Irregularly irregular rhythm. No murmur   Chest: Effort normal and breath sounds normal. No wheezing, rales or rhonchi  Abdominal: Soft. NT/ND. Bowel sounds present  Musculoskeletal: Normal ROM.  No edema. No calf tenderness.  Neurological: AAOx3, no focal deficits  Skin: Skin is warm and dry. No rash  Psychiatric: Normal mood and affect.      Discharge Disposition  Home or Self Care    Discharge Medications     Discharge Medications      New Medications      Instructions Start Date   aspirin 81 MG chewable tablet   81 mg, Oral, Daily   Start Date:  6/22/2019     vitamin B-12 250 MCG tablet  Commonly known as:  CYANOCOBALAMIN   250 mcg, Oral, Weekly         Continue These Medications      Instructions Start Date   atorvastatin 10 MG tablet  Commonly known as:  LIPITOR   20 mg, Oral, Nightly      Cholecalciferol 49620 units tablet   2,000 Units, Oral, 2 Times Daily      clopidogrel 75 MG tablet  Commonly known as:  PLAVIX   75 mg, Oral, Daily      ezetimibe 10 MG tablet  Commonly known as:  ZETIA   10 mg, Oral, Daily      hydrochlorothiazide 25 MG tablet  Commonly known as:   HYDRODIURIL   12.5 mg, Oral, Daily      levETIRAcetam 500 MG tablet  Commonly known as:  KEPPRA   500 mg, Oral, 2 Times Daily      levothyroxine 25 MCG tablet  Commonly known as:  SYNTHROID, LEVOTHROID   25 mcg, Oral, Daily      pantoprazole 20 MG EC tablet  Commonly known as:  PROTONIX   20 mg, Oral, Daily      pregabalin 100 MG capsule  Commonly known as:  LYRICA   100 mg, Oral, 3 Times Daily      sulfaSALAzine 500 MG tablet  Commonly known as:  AZULFIDINE   1,000 mg, Oral, 2 Times Daily      tiZANidine 4 MG tablet  Commonly known as:  ZANAFLEX   2 mg, Oral, Nightly PRN         Stop These Medications    amLODIPine 5 MG tablet  Commonly known as:  NORVASC     lisinopril 20 MG tablet  Commonly known as:  PRINIVIL,ZESTRIL     metoprolol succinate  MG 24 hr tablet  Commonly known as:  TOPROL-XL            Discharge Diet: healthy heart    Activity at Discharge: as tolerated    Follow-up Appointments  Future Appointments   Date Time Provider Department Center   9/18/2019  1:40 PM Ciera Garcia MD [facility] None     Additional Instructions for the Follow-ups that You Need to Schedule     Call MD With Problems / Concerns   As directed      Instructions: Call [phone] or email [email or web address] for problems or concerns.    Order Comments:  Instructions: Call [phone] or email [email or web address] for problems or concerns.          Discharge Follow-up with PCP   As directed       Currently Documented PCP:    Keith Alston MD    PCP Phone Number:    [phone]     Follow Up Details:  1-2 weeks         Discharge Follow-up with Specified Provider: Cardiology - ; 2 Weeks   As directed      To:  Cardiology -     Follow Up:  2 Weeks         Discharge Follow-up with Specified Provider: Ophthalmology   As directed      To:  Ophthalmology    Follow Up Details:  f/u as scheduled               Test Results Pending at Discharge       Risk for Readmission (LACE) Score: 7  (6/21/2019  6:00 AM)          Bethany Gipson MD  06/21/19  6:47 PM    Time: Discharge 34 min

## 2019-06-21 NOTE — THERAPY EVALUATION
Acute Care - Occupational Therapy Initial Evaluation   [facility]     Patient Name: Delphine Rob  : 1945  MRN: [id number]  Today's Date: 2019             Admit Date: 2019       ICD-10-CM ICD-9-CM   1. Chest pain in adult R07.9 786.50   2. Ventricular bigeminy I49.9 427.89   3. Bradycardia with 51-60 beats per minute R00.1 427.89     Patient Active Problem List   Diagnosis   • Chest pain in adult   • Ventricular bigeminy   • Bradycardia with 51-60 beats per minute     Past Medical History:   Diagnosis Date   • Arthritis    • Atrial fibrillation (CMS/HCC)    • Bronchitis    • Chickenpox    • COPD (chronic obstructive pulmonary disease) (CMS/HCC)    • Cramps of lower extremity     bilateral with weakness   • Heart disease    • Hepatitis C 2013    treatment x 11 months   • High cholesterol    • History of degenerative disc disease    • HLD (hyperlipidemia)    • HTN (hypertension)    • Hypothyroidism    • Measles    • Seizure disorder (CMS/HCC)    • Skin melanoma (CMS/HCC) 2013    s/p lymph node removal on the right   • Sleep apnea    • Stroke (CMS/HCC)    • Whooping cough      Past Surgical History:   Procedure Laterality Date   • APPENDECTOMY     • AXILLARY NODE DISSECTION Right 2014   • CATARACT EXTRACTION  01/2017    x 2   • CHOLECYSTECTOMY     • COLONOSCOPY  2017   • CORONARY ANGIOPLASTY WITH STENT PLACEMENT      stent placement after CABG   • CORONARY ARTERY BYPASS GRAFT  2010   • CORONARY ARTERY BYPASS GRAFT     • FEMORAL POPLITEAL BYPASS Right 2017    Dr. Palmer   • INCISIONAL HERNIA REPAIR  2016    lap. Dr. West   • MOLE REMOVAL Right     arm   • OTHER SURGICAL HISTORY      laser surgery X 2   • PARTIAL HYSTERECTOMY     • THYROIDECTOMY, PARTIAL      goiter removed 1/2 thyroid          OT ASSESSMENT FLOWSHEET (last 12 hours)      Occupational Therapy Evaluation     Row Name 19 0900                   General Information    Prior Level of Function  -- mod  (I) mobility w/ Rollator per Pt report.  -        Equipment Currently Used at Home  shower chair  -        Pertinent History of Current Functional Problem  Pt admitted w/ chest pain, bradycardia, & ventricular bigamy. She has had heart cath in the setting of prior stent & CABG. OT not finding that addittional stents had been placed. Pt is having significant but improving diplopia s/p Cardiac Catheterization. CT neg for CVA. Remaining differentials per MD notes include small occipital anuerism, medial rectus palsy.   -           Resource/Environmental Concerns    Current Living Arrangements  -- mobile home w/ 5 steps & Lt side rail.  -           Functional Mobility    Functional Mobility- Ind. Level  supervision required  -        Functional Mobility- Device  rolling walker  -        Functional Mobility- Comment  -- into bahroom & back out for desired ADL.  -           Sit-Stand Transfer    Sit-Stand [place] (Transfers)  set up  -        Assistive Device (Sit-Stand Transfers)  walker, front-wheeled  -           Stand-Sit Transfer    Stand-Sit [place] (Transfers)  supervision  -           ADL Assessment/Intervention    BADL Assessment/Intervention  grooming;toileting  -           Grooming Assessment/Training    [place] Level (Grooming)  supervision;set up  -           Toileting Assessment/Training    [place] Level (Toileting)  supervision  -           BADL Safety/Performance    Impairments, BADL Safety/Performance  coordination  -        Skilled BADL Treatment/Intervention  environmental modifications;sensory/visual deficit compensatory training eyepatch corrects diplopia.  -           General ROM    GENERAL ROM COMMENTS  WNL  -           MMT (Manual Muscle Testing)    General MMT Comments  WFL  -           Motor Assessment/Interventions    Additional Documentation  Functional Endurance Training (Group)  -           Functional Endurance Training    Comment,  Functional Endurance  -- up in bathroom for 15 minutes, washing hair, etc.  -           Positioning and Restraints    Pre-Treatment Position  sitting in chair/recliner  -        Post Treatment Position  chair  -        In Chair  call light within reach;exit alarm on  -           Wound 06/19/19 1915 Right anterior greater trochanter surgical;incision    Wound - Properties Group Date first assessed: 06/19/19  -KK Time first assessed: 1915  -KK Side: Right  -KK Orientation: anterior  -KK Location: greater trochanter  -KK Type: surgical;incision  -KK       Coping    Observed Emotional State  pleasant  -           Clinical Impression (OT)    Anticipated Equipment Needs at Discharge (OT)  -- Pt has rollator &   -        Anticipated Discharge Disposition (OT)  home with home health Pt has current HH aide.  -        Demonstrates Need for Referral to Another Service (OTl)  -- opthamologist consulted.  -           Planned OT Interventions    Planned Therapy Interventions (OT Eval)  activity tolerance training;adaptive equipment training;cognitive/visual perception retraining  -           OT Goals    Dressing Goal Selection (OT)  dressing, OT goal (free text)  -        Activity Tolerance Goal Selection (OT)  activity tolerance, OT goal 1  -        Additional Documentation  Activity Tolerance Goal Selection (OT) (Row) 20 min upright activity for return to role as CG.  -           Dressing Goal (OT)    Dressing Goal (OT)  Mod (A) w/ adaptive training to ensure diplopia is not limiting Pt function or safety.  -          User Key  (r) = Recorded By, (t) = Taken By, (c) = Cosigned By    Initials Name Effective Dates     Meli Ley OT 03/01/19 -     Xochitl Bolaños RN 03/01/19 -                OT Recommendation and Plan  Outcome Summary/Treatment Plan (OT)  Anticipated Equipment Needs at Discharge (OT): (Pt has rollator & )  Anticipated Discharge Disposition (OT): home with home health(Pt has  current  aide.)  Demonstrates Need for Referral to Another Service (OTl): (opthamologist consulted.)  Planned Therapy Interventions (OT Eval): activity tolerance training, adaptive equipment training, cognitive/visual perception retraining  Plan of Care Review  Plan of Care Reviewed With: patient  Plan of Care Reviewed With: patient  Outcome Summary: Pt showing improved function & act. hussain. 1-2 hours after getting up in the a.m. w/ PT. Able to walk w/ RW to the bathroom for 15 minute ADL primarily in standing. Min (A) needed to wash hair over sink & distant supervision provided otherwise for safety & to assess ROM, executive functioning & visual/motor integration. Pt is expected to improve well enough functionallly to go home w/ HHC at d/c to address her limited functional endurance.    Outcome Measures     Row Name 06/21/19 1000             How much help from another is currently needed...    Putting on and taking off regular lower body clothing?  4  -MH      Bathing (including washing, rinsing, and drying)  3  -MH      Toileting (which includes using toilet bed pan or urinal)  4  -MH      Putting on and taking off regular upper body clothing  4  -MH      Taking care of personal grooming (such as brushing teeth)  4  -MH      Eating meals  4  -      Score  23  -         Functional Assessment    Outcome Measure Options  AM-PAC 6 Clicks Daily Activity (OT)  -        User Key  (r) = Recorded By, (t) = Taken By, (c) = Cosigned By    Initials Name Provider Type     Meli Ley, OT Occupational Therapist          Time Calculation:   Time Calculation- OT     Row Name 06/21/19 1015             Time Calculation- OT    OT Start Time  0930  -      OT Stop Time  1000  -      OT Time Calculation (min)  30 min  -      Total Timed Code Minutes- OT  20 minute(s)  -      OT Received On  06/21/19  -      OT - Next Appointment  06/24/19  -      OT Goal Re-Cert Due Date  07/05/19  -        User Key  (r) =  Recorded By, (t) = Taken By, (c) = Cosigned By    Initials Name Provider Type     Meli Ley, OT Occupational Therapist        Therapy Charges for Today     Code Description Service Date Service Provider Modifiers Qty    85434695500 HC OT EVAL MOD COMPLEXITY 4 6/21/2019 Meli Ley OT GO 1    [id number]  OT SENS INTEGRATIVE TECH EACH 15 MIN 6/21/2019 Meli Ley OT GO 1    [id number]  OT SELF CARE/MGMT/TRAIN EA 15 MIN 6/21/2019 Meli Ley OT GO 1               Meli Ley OT  6/21/2019

## 2019-06-22 ENCOUNTER — READMISSION MANAGEMENT (OUTPATIENT)
Dept: CALL CENTER | Facility: HOSPITAL | Age: 74
End: 2019-06-22

## 2019-06-22 NOTE — OUTREACH NOTE
Prep Survey      Responses   Facility patient discharged from?  [facility]   Is patient eligible?  Yes   Discharge diagnosis  bigeminy   Does the patient have one of the following disease processes/diagnoses(primary or secondary)?  Other   Does the patient have Home health ordered?  No   Is there a DME ordered?  No   Prep survey completed?  Yes          Bonnie Meza RN

## 2019-06-23 RX ORDER — ATORVASTATIN CALCIUM 20 MG/1
TABLET, FILM COATED ORAL
Qty: 90 TABLET | Refills: 3 | OUTPATIENT
Start: 2019-06-23

## 2019-06-24 ENCOUNTER — READMISSION MANAGEMENT (OUTPATIENT)
Dept: CALL CENTER | Facility: HOSPITAL | Age: 74
End: 2019-06-24

## 2019-06-24 NOTE — PROGRESS NOTES
Case Management Discharge Note    Final Note: Routine d/c to home with daughter    Destination      No service has been selected for the patient.      Durable Medical Equipment      No service has been selected for the patient.      Dialysis/Infusion      No service has been selected for the patient.      Home Medical Care      No service has been selected for the patient.      Therapy      No service has been selected for the patient.      Community Resources      No service has been selected for the patient.             Final Discharge Disposition Code: 01 - home or self-care

## 2019-06-24 NOTE — OUTREACH NOTE
Medical Week 1 Survey      Responses   Facility patient discharged from?  [facility]   Does the patient have one of the following disease processes/diagnoses(primary or secondary)?  Other   Is there a successful TCM telephone encounter documented?  No   Week 1 attempt successful?  Yes   Call start time  1054   Call end time  1058   Discharge diagnosis  bigyasiriny   Is patient permission given to speak with other caregiver?  Yes   List who call center can speak with  HIWOT PIERSON   Person spoke with today (if not patient) and relationship  HIWOT PIERSON- DAUGHTER   Meds reviewed with patient/caregiver?  Yes   Is the patient having any side effects they believe may be caused by any medication additions or changes?  No   Does the patient have all medications ordered at discharge?  Yes   Is the patient taking all medications as directed (includes completed medication regime)?  Yes   Medication comments  PT AND DAUGHTER QUESTIONED IF SHE SHOULD CONTINUE VITAMIN D. ILENE INFORMED PT SHOULD CONTINUE VITAMIN D AND IT IS LISTED ON AVS AS CHOLECALCIFEROL, THE GENERIC NAME. DTR VOICED UNDERSTANDING.    Does the patient have an appointment with their PCP within 7 days of discharge?  No   What is preventing the patient from scheduling follow up appointments within 7 days of discharge?  Haven't had time   Nursing Interventions  Educated patient on importance of making appointment   Has the patient kept scheduled appointments due by today?  N/A   Has home health visited the patient within 72 hours of discharge?  N/A   Did the patient receive a copy of their discharge instructions?  Yes   Nursing interventions  Reviewed instructions with patient   What is the patient's perception of their health status since discharge?  Improving   Is the patient/caregiver able to teach back signs and symptoms related to disease process for when to call PCP?  Yes   Is the patient/caregiver able to teach back signs and symptoms related to disease  process for when to call 911?  Yes   Is the patient/caregiver able to teach back the hierarchy of who to call/visit for symptoms/problems? PCP, Specialist, Home health nurse, Urgent Care, ED, 911  Yes   Week 1 call completed?  Yes   Graduated  Yes   Did the patient feel the follow up calls were helpful during their recovery period?  Yes   Was the number of calls appropriate?  Yes          Mary Waters LPN

## 2019-06-25 ENCOUNTER — TELEPHONE (OUTPATIENT)
Dept: CARDIOLOGY | Facility: CLINIC | Age: 74
End: 2019-06-25

## 2019-06-25 NOTE — TELEPHONE ENCOUNTER
Call from [facility] (yolanda).  Dr Winn took pt off her beta blockers at recent visit to [facility].  Her b/p is 155/96 today, HR 53.

## 2019-06-26 RX ORDER — METOPROLOL SUCCINATE 100 MG/1
100 TABLET, EXTENDED RELEASE ORAL DAILY
COMMUNITY
End: 2019-06-26 | Stop reason: SDUPTHER

## 2019-06-26 RX ORDER — METOPROLOL SUCCINATE 100 MG/1
100 TABLET, EXTENDED RELEASE ORAL DAILY
Qty: 30 TABLET | Refills: 5 | Status: SHIPPED | OUTPATIENT
Start: 2019-06-26 | End: 2019-01-01 | Stop reason: HOSPADM

## 2019-06-26 NOTE — TELEPHONE ENCOUNTER
Spoke with patients daughter, advised to start back on the metoprolol. Sending in RX to [facility] drugs in [place].

## 2019-07-10 PROBLEM — E78.5 HYPERLIPIDEMIA: Status: ACTIVE | Noted: 2019-07-10

## 2019-07-10 PROBLEM — R53.83 FATIGUE: Status: ACTIVE | Noted: 2017-10-17

## 2019-08-05 NOTE — PROGRESS NOTES
Encounter Date:08/05/2019  Recent hospital follow-up    Patient ID: Delphine Rob is a 73 y.o. female.    Chief Complaint:  Status post CABG  Hypertension  Dyslipidemia  Paroxysmal atrial fibrillation    History of Present Illness  Patient recently was admitted to [facility] [place] with chest discomfort and unstable angina and subsequently had cardiac catheterization that revealed totally occluded graft to RCA.  Patient was released home.    Since I have last seen, the patient has been without any chest discomfort ,shortness of breath, palpitations, dizziness or syncope.  Denies having any headache ,abdominal pain ,nausea, vomiting , diarrhea constipation, loss of weight or loss of appetite.  Denies having any excessive bruising ,hematuria or blood in the stool.    Review of all systems negative except as indicated  Assessment and Plan       /////////////////  impression  ---------------------------   -Status post CABG 01/20/2010 at [facility].  Status post stent placement after CABG ( details not available).    Cardiac catheterization and coronary angiography 6/19/2019 revealed  Left ventricle size and contractility is normal with proximal inferior wall hypokinesis.  Ejection fraction is 55%.  Left main artery is normal.  Left anterior descending artery is noted in the midsegment with filling of the LAD through LIMA.  Circumflex coronary artery is normal  Right coronary artery is a very small caliber vessel with 99% disease in the midsegment with filling of the distal vessel through collaterals.    RAUSCH to LAD is patent  SVG to marginal branch is patent  SVG to RCA is totally occluded.        Echocardiogram 11/13/2017 revealed mild mitral regurgitation and normal left ventricular function.  Carri scan Cardiolite test is negative for myocardial ischemia 11/13/2017     -Intermittent atrial fibrillation.  Patient is maintaining sinus rhythm.       -History of  stroke from which patient has  recovered.      -Hypertension dyslipidemia and hypothyroidism      -Status post thyroidectomy for cyst cholecystectomy and hysterectomy.      -History of hepatitis-C      -Allergy to penicillin      -Former smoker quit smoking October 2013.      -Status post  Malignant melanoma removal from right elbow area.  Patient did not need chemotherapy     -history of peripheral vascular disease.  CTA 06/30/2017 revealed right superficial femoral artery occlusion  ---------------.  Plan  ----------------------  Recent cardiac catheterization results were discussed with patient and educated patient and patient's family at bedside.  Patient is not having any angina pectoris or congestive heart failure.  Continue Plavix.  Medications were reviewed and updated.  Further plan will depend on patient's progress.  Follow-up in office in 6 months.  ]]]]]]]]]]]]]]]]             Diagnosis Plan   1. Hx of CABG     2. Essential hypertension     3. Dyslipidemia     4. Paroxysmal atrial fibrillation (CMS/HCC)     LAB RESULTS (LAST 7 DAYS)    CBC        BMP        CMP         BNP        TROPONIN        CoAg        Creatinine Clearance  CrCl cannot be calculated (Patient's most recent lab result is older than the maximum 30 days allowed.).    ABG        Radiology  No radiology results for the last day                The following portions of the patient's history were reviewed and updated as appropriate: allergies, current medications, past family history, past medical history, past social history, past surgical history and problem list.    Review of Systems   Constitution: Positive for malaise/fatigue.   Cardiovascular: Positive for leg swelling (in left leg ). Negative for chest pain, palpitations and syncope.   Respiratory: Positive for shortness of breath.    Skin: Negative for rash.   Gastrointestinal: Negative for nausea and vomiting.   Neurological: Positive for dizziness. Negative for light-headedness and numbness.         Current  Outpatient Medications:   •  b complex vitamins tablet, B COMPLEX TABS, Disp: , Rfl:   •  betamethasone dipropionate (DIPROLENE) 0.05 % cream, BETAMETHASONE DIPROPIONATE 0.05 % CREA, Disp: , Rfl:   •  butalbital-acetaminophen-caffeine (FIORICET, ESGIC) -40 MG per tablet, Take 1 tablet by mouth., Disp: , Rfl:   •  ipratropium-albuterol (DUO-NEB) 0.5-2.5 mg/3 ml nebulizer, IPRATROPIUM-ALBUTEROL 0.5-2.5 (3) MG/3ML SOLN, Disp: , Rfl:   •  lisinopril (PRINIVIL,ZESTRIL) 20 MG tablet, Take 20 mg by mouth Daily., Disp: , Rfl:   •  Multiple Vitamins-Minerals (MULTI VITAMIN/MINERALS) tablet, MULTI VITAMIN/MINERALS TABS, Disp: , Rfl:   •  aspirin 81 MG chewable tablet, Chew 1 tablet Daily., Disp: 30 tablet, Rfl: 0  •  atorvastatin (LIPITOR) 10 MG tablet, Take 20 mg by mouth Every Night., Disp: , Rfl:   •  Cholecalciferol 49874 units tablet, Take 2,000 Units by mouth 2 (Two) Times a Day., Disp: , Rfl:   •  clonazePAM (KlonoPIN) 1 MG tablet, Take 1 mg by mouth., Disp: , Rfl:   •  clopidogrel (PLAVIX) 75 MG tablet, Take 75 mg by mouth Daily., Disp: , Rfl:   •  ezetimibe (ZETIA) 10 MG tablet, Take 10 mg by mouth Daily., Disp: , Rfl:   •  fexofenadine (ALLEGRA) 180 MG tablet, Take 180 mg by mouth., Disp: , Rfl:   •  hydrochlorothiazide (HYDRODIURIL) 25 MG tablet, Take 12.5 mg by mouth Daily., Disp: , Rfl:   •  hydrOXYzine (ATARAX) 25 MG tablet, Take 25 mg by mouth., Disp: , Rfl:   •  Ipratropium-Albuterol (COMBIVENT IN), Inhale 2 puffs., Disp: , Rfl:   •  isosorbide mononitrate (IMDUR) 60 MG 24 hr tablet, Take 60 mg by mouth Daily., Disp: , Rfl:   •  levETIRAcetam (KEPPRA) 500 MG tablet, Take 500 mg by mouth 2 (Two) Times a Day., Disp: , Rfl:   •  levothyroxine (SYNTHROID, LEVOTHROID) 25 MCG tablet, Take 25 mcg by mouth Daily., Disp: , Rfl:   •  metoprolol succinate XL (TOPROL-XL) 100 MG 24 hr tablet, Take 1 tablet by mouth Daily., Disp: 30 tablet, Rfl: 5  •  omeprazole (priLOSEC) 20 MG capsule, Take 20 mg by mouth Daily.,  Disp: , Rfl:   •  pantoprazole (PROTONIX) 20 MG EC tablet, Take 20 mg by mouth Daily., Disp: , Rfl:   •  pregabalin (LYRICA) 100 MG capsule, Take 100 mg by mouth 3 (Three) Times a Day., Disp: , Rfl:   •  sulfaSALAzine (AZULFIDINE) 500 MG tablet, Take 1,000 mg by mouth 2 (Two) Times a Day., Disp: , Rfl:   •  tiZANidine (ZANAFLEX) 4 MG tablet, Take 2 mg by mouth At Night As Needed for Muscle Spasms., Disp: , Rfl:   •  vitamin B-12 (CYANOCOBALAMIN) 250 MCG tablet, Take 1 tablet by mouth 1 (One) Time Per Week., Disp: 4 tablet, Rfl: 1    Allergies   Allergen Reactions   • Penicillamine Unknown (See Comments)   • Penicillins Hives       Family History   Problem Relation Age of Onset   • Hypertension Mother    • Cancer Mother    • Gout Mother    • Asthma Mother    • Cancer Father    • Heart disease Brother    • Diabetes Other    • Cancer Other    • Tuberculosis Other        Past Surgical History:   Procedure Laterality Date   • APPENDECTOMY     • AXILLARY NODE DISSECTION Right 05/2014   • CARDIAC CATHETERIZATION N/A 6/19/2019    Procedure: Left Heart Cath;  Surgeon: Real Winn MD;  Location: [facility] CATH INVASIVE LOCATION;  Service: Cardiovascular   • CARDIAC CATHETERIZATION N/A 6/19/2019    Procedure: Saphenous Vein Graft;  Surgeon: Real Winn MD;  Location: [facility] CATH INVASIVE LOCATION;  Service: Cardiovascular   • CARDIAC CATHETERIZATION N/A 6/19/2019    Procedure: Left ventriculography;  Surgeon: Real Winn MD;  Location: [facility] CATH INVASIVE LOCATION;  Service: Cardiovascular   • CARDIAC CATHETERIZATION N/A 6/19/2019    Procedure: Coronary angiography;  Surgeon: Real Winn MD;  Location: [facility] CATH INVASIVE LOCATION;  Service: Cardiovascular   • CATARACT EXTRACTION  01/2017    x 2   • CHOLECYSTECTOMY  2004   • COLONOSCOPY  04/05/2017   • CORONARY ANGIOPLASTY WITH STENT PLACEMENT      stent placement after CABG   • CORONARY ARTERY BYPASS GRAFT  01/20/2010   • CORONARY ARTERY BYPASS GRAFT    "  • FEMORAL POPLITEAL BYPASS Right 07/20/2017    Dr. Palmer   • INCISIONAL HERNIA REPAIR  09/28/2016    lap. Dr. West   • MOLE REMOVAL Right     arm   • OTHER SURGICAL HISTORY      laser surgery X 2   • PARTIAL HYSTERECTOMY     • THYROIDECTOMY, PARTIAL      goiter removed 1/2 thyroid       Past Medical History:   Diagnosis Date   • Arthritis    • Atrial fibrillation (CMS/HCC)    • Bronchitis    • Chickenpox    • COPD (chronic obstructive pulmonary disease) (CMS/HCC)    • Cramps of lower extremity     bilateral with weakness   • Heart disease    • Hepatitis C 2013    treatment x 11 months   • High cholesterol    • History of degenerative disc disease    • HLD (hyperlipidemia)    • HTN (hypertension)    • Hypothyroidism    • Measles    • Seizure disorder (CMS/HCC)    • Skin melanoma (CMS/HCC) 2013    s/p lymph node removal on the right   • Sleep apnea    • Stroke (CMS/HCC)    • Whooping cough        Family History   Problem Relation Age of Onset   • Hypertension Mother    • Cancer Mother    • Gout Mother    • Asthma Mother    • Cancer Father    • Heart disease Brother    • Diabetes Other    • Cancer Other    • Tuberculosis Other        Social History     Socioeconomic History   • Marital status:      Spouse name: Not on file   • Number of children: Not on file   • Years of education: Not on file   • Highest education level: Not on file   Tobacco Use   • Smoking status: Former Smoker     Last attempt to quit: 6/17/2009     Years since quitting: 10.1   • Smokeless tobacco: Never Used   Substance and Sexual Activity   • Alcohol use: No     Frequency: Never   • Drug use: No         Procedures      Objective:       Physical Exam    /71 (BP Location: Left arm, Patient Position: Sitting, Cuff Size: Adult)   Pulse 67   Ht 157.5 cm (62\")   Wt 79.9 kg (176 lb 4 oz)   SpO2 95%   BMI 32.24 kg/m²   The patient is alert, oriented and in no distress.    Vital signs as noted above.    Head and neck revealed no " carotid bruits or jugular venous distension.  No thyromegaly or lymphadenopathy is present.    Lungs clear.  No wheezing.  Breath sounds are normal bilaterally.    Heart normal first and second heart sounds.  No murmur..  No pericardial rub is present.  No gallop is present.    Abdomen soft and nontender.  No organomegaly is present.    Extremities revealed good peripheral pulses without any pedal edema.    Skin warm and dry.    Musculoskeletal system is grossly normal.    CNS grossly normal.

## 2019-09-18 PROBLEM — R55 NEAR SYNCOPE: Status: ACTIVE | Noted: 2019-01-01

## 2019-09-18 NOTE — ED NOTES
p tst she has been feeling weak for last several hours. Co intermittent headache     Keya Esquivel RN  09/18/19 4148

## 2019-09-18 NOTE — ED PROVIDER NOTES
Subjective   73-year-old female planing of near syncopal episode today.  The patient states she had increasing palpitations and was concerned that she gone back into atrial fibrillation she reports that she has been tired and had no energy.  She states her beta-blocker dose was recently decreased but she still has had slow heart rate.  She reports no fever chills.  She reports no nausea vomiting or diarrhea.  She does report palpitations and some intermittent nausea.  Reports no focal defects or lateralizing neurologic signs.            Review of Systems   Constitutional: Positive for fatigue. Negative for unexpected weight change.   Respiratory: Positive for shortness of breath.    Cardiovascular: Positive for palpitations. Negative for chest pain.   Neurological: Positive for syncope and light-headedness.   Hematological: Does not bruise/bleed easily.   All other systems reviewed and are negative.      Past Medical History:   Diagnosis Date   • Arthritis    • Atrial fibrillation (CMS/HCC)    • Bronchitis    • Chickenpox    • COPD (chronic obstructive pulmonary disease) (CMS/HCC)    • Cramps of lower extremity     bilateral with weakness   • Heart disease    • Hepatitis C 2013    treatment x 11 months   • High cholesterol    • History of degenerative disc disease    • HLD (hyperlipidemia)    • HTN (hypertension)    • Hypothyroidism    • Measles    • Seizure disorder (CMS/HCC)    • Skin melanoma (CMS/HCC) 2013    s/p lymph node removal on the right   • Sleep apnea    • Stroke (CMS/HCC)    • Whooping cough        Allergies   Allergen Reactions   • Penicillamine Unknown (See Comments)   • Penicillins Hives       Past Surgical History:   Procedure Laterality Date   • APPENDECTOMY     • AXILLARY NODE DISSECTION Right 05/2014   • CARDIAC CATHETERIZATION N/A 6/19/2019    Procedure: Left Heart Cath;  Surgeon: Real Winn MD;  Location: [facility] CATH INVASIVE LOCATION;  Service: Cardiovascular   • CARDIAC  CATHETERIZATION N/A 6/19/2019    Procedure: Saphenous Vein Graft;  Surgeon: Real Winn MD;  Location: [facility] CATH INVASIVE LOCATION;  Service: Cardiovascular   • CARDIAC CATHETERIZATION N/A 6/19/2019    Procedure: Left ventriculography;  Surgeon: Real Winn MD;  Location: [facility] CATH INVASIVE LOCATION;  Service: Cardiovascular   • CARDIAC CATHETERIZATION N/A 6/19/2019    Procedure: Coronary angiography;  Surgeon: Real Winn MD;  Location: [facility] CATH INVASIVE LOCATION;  Service: Cardiovascular   • CATARACT EXTRACTION  01/2017    x 2   • CHOLECYSTECTOMY  2004   • COLONOSCOPY  04/05/2017   • CORONARY ANGIOPLASTY WITH STENT PLACEMENT      stent placement after CABG   • CORONARY ARTERY BYPASS GRAFT  01/20/2010   • CORONARY ARTERY BYPASS GRAFT     • FEMORAL POPLITEAL BYPASS Right 07/20/2017    Dr. Palmer   • INCISIONAL HERNIA REPAIR  09/28/2016    lap. Dr. West   • MOLE REMOVAL Right     arm   • OTHER SURGICAL HISTORY      laser surgery X 2   • PARTIAL HYSTERECTOMY     • THYROIDECTOMY, PARTIAL      goiter removed 1/2 thyroid       Family History   Problem Relation Age of Onset   • Hypertension Mother    • Cancer Mother    • Gout Mother    • Asthma Mother    • Cancer Father    • Heart disease Brother    • Diabetes Other    • Cancer Other    • Tuberculosis Other        Social History     Socioeconomic History   • Marital status:      Spouse name: Not on file   • Number of children: 2   • Years of education: Not on file   • Highest education level: Not on file   Tobacco Use   • Smoking status: Former Smoker     Last attempt to quit: 6/17/2009     Years since quitting: 10.2   • Smokeless tobacco: Never Used   Substance and Sexual Activity   • Alcohol use: Yes     Frequency: Monthly or less     Comment: beer   • Drug use: No           Objective   Physical Exam  Alert Traci Coma Scale 15   HEENT: Pupils equal and reactive to light. Conjunctivae are not injected. normal tympanic membranes.  Oropharynx and nares are normal.   Neck: Supple. Midline trachea. No JVD. No goiter.   Chest: Clear and equal breath sounds bilaterally regular rate and rhythm without murmur or rub.   Abdomen: Positive bowel sounds nontender nondistended. No rebound or peritoneal signs. No CVA tenderness.   Extremities no clubbing cyanosis or edema motor sensory exam is normal the full range of motion is intact   skin: Warm and dry, no rashes or petechia.   Lymphatic: No regional lymphadenopathy. No calf pain, swelling or Ga's sign    Procedures           ED Course        Labs Reviewed   COMPREHENSIVE METABOLIC PANEL - Abnormal; Notable for the following components:       Result Value    Potassium 3.5 (*)     Calcium 8.4 (*)     Alkaline Phosphatase 93 (*)     All other components within normal limits    Narrative:     The MDRD GFR formula is only valid for adults with stable renal function between ages 18 and 70.   CBC WITH AUTO DIFFERENTIAL - Abnormal; Notable for the following components:    Monocyte % 12.6 (*)     All other components within normal limits   PROTIME-INR - Normal   APTT - Normal   TROPONIN (IN-HOUSE) - Normal    Narrative:     Troponin I Reference Range:    0.00-0.03  Negative.  Repeat testing in 4-6 hours if clinically indicated.    0.04-0.29  Suspicious for myocardial injury. Serial measurements and clinical  correlation may be necessary to confirm or exclude diagnosis of acute  coronary syndrome.  Repeat testing in 4-6 hours if indicated.     >0.29 Consistent with myocardial injury.  Recommend clinical and laboratory correlation.     Results my be falsely decreased if patient taking Biotin.    CBC AND DIFFERENTIAL    Narrative:     The following orders were created for panel order CBC & Differential.  Procedure                               Abnormality         Status                     ---------                               -----------         ------                     CBC Auto Differential[[id number]]         Abnormal            Final result                 Please view results for these tests on the individual orders.     Medications   metoprolol tartrate (LOPRESSOR) injection 2.5 mg (2.5 mg Intravenous Given 9/18/19 1838)   potassium chloride (K-DUR,KLOR-CON) CR tablet 40 mEq (40 mEq Oral Given 9/18/19 1918)     Mri Brain Without Contrast    Result Date: 9/18/2019  1. Negative for acute ischemia, mass, or hemorrhage. 2. Stable chronic findings above.  Electronically Signed By-Cedric Lehman On:9/18/2019 5:52 PM This report was finalized on [id number] by  Cedric Lehman, .              MDM  Number of Diagnoses or Management Options     Amount and/or Complexity of Data Reviewed  Clinical lab tests: reviewed  Tests in the radiology section of CPT®: reviewed  Tests in the medicine section of CPT®: reviewed  Obtain history from someone other than the patient: yes  Review and summarize past medical records: yes  Discuss the patient with other providers: yes  Independent visualization of images, tracings, or specimens: yes    Risk of Complications, Morbidity, and/or Mortality  Presenting problems: high  Diagnostic procedures: high  Management options: high  General comments: The patient had no change in ventricular ectopy with low-dose IV Lopressor.  Potassium replacement was started in the emergency department.  The case was discussed the hospitalist practitioner.  Patient be admitted for serial troponin and EKG.  The patient will likely need alteration in medication regimen as the patient's underlying rate appears to be in the upper 40s to low 50s for several days according to the family        Final diagnoses:   Near syncope   Ventricular bigeminy   Hypokalemia              Sid Taylor MD  09/18/19 0600

## 2019-09-18 NOTE — PROGRESS NOTES
She is a pleasant 73-year-old female who comes today in follow-up.  She suffers from rheumatoid arthritis.  She takes sulfasalazine 500 mg 2 tablets twice daily regularly.  The dose was increased on her prior visit 5/14/2019.  She has been taking the medication regularly, denies any side effects from this treatment.     Today she refers pain in her right knee which is present most of the times especially with ambulation.  Sometimes she feels that her knee gives away, this problem has been getting progressively worse, she reports morning stiffness that lasts 2 minutes.  She has not noticed joint swelling.  Reportedly she had an x-ray of her knee which was normal.    Otherwise, she has no major complaints.  Denies fever or chills, she has dry eyes and dry mouth denies oral nasal ulcers, no nausea vomiting, no diarrhea, no chest pain or shortness of breath.  All other systems reviewed and they were negative.    Laboratory show CCP negative, CRP 0.26, ESR 11, creatinine 1.0, alkaline phosphatase 95 (91), AST and ALT normal, rheumatoid factor negative, CBC normal.    Active Ambulatory Problems     Diagnosis Date Noted   • Ventricular bigeminy 06/17/2019   • Atrial fibrillation (CMS/HCC) 11/18/2014   • Coronary angioplasty status 11/14/2014   • Disequilibrium 05/12/2014   • Fatigue 10/17/2017   • Hyperlipidemia 07/10/2019   • Hypertension 10/23/2014   • CAD (coronary artery disease) 05/13/2014     Resolved Ambulatory Problems     Diagnosis Date Noted   • Chest pain in adult 06/17/2019   • Bradycardia with 51-60 beats per minute 06/17/2019     Past Medical History:   Diagnosis Date   • Arthritis    • Atrial fibrillation (CMS/HCC)    • Bronchitis    • Chickenpox    • COPD (chronic obstructive pulmonary disease) (CMS/HCC)    • Cramps of lower extremity    • Heart disease    • Hepatitis C 2013   • High cholesterol    • History of degenerative disc disease    • HLD (hyperlipidemia)    • HTN (hypertension)    • Hypothyroidism     • Measles    • Seizure disorder (CMS/HCC)    • Skin melanoma (CMS/HCC) 2013   • Sleep apnea    • Stroke (CMS/[facility])    • Whooping cough      Physical examination:  Vitals:    09/18/19 1358   BP: 142/70   Pulse: (!) 44     GENERAL: Well-developed, well-nourished in no acute distress. Alert and oriented x3.  HEENT: Normocephalic, atraumatic. Pupils are equal, round, and reactive to light. Extraocular muscles are intact. Mucous membranes are pink and moist. Nostrils are clear.   NECK: Supple without lymphadenopathy.  LUNGS: Clear to auscultation bilaterally.  HEART: Bradycardic with irregular beats at times.  No rubs or gallops.  CHEST: Respirations easy and unlabored.  EXTREMITIES: No cyanosis, edema or clubbing.  SKIN: Warm, dry and intact.  MSK: Right knee with flexion and extension of this preserved.  Anterior and posterior drawer test is negative Fredrick test elicits tenderness medially on the right side.  Ballottement and bulge are negative.  Otherwise no small joint tenderness, no synovitis.    Assessment:    1 rheumatoid arthritis managing well on sulfasalazine.  ESR 11.  Patient global assessment 50.  METZ/28 score, remission.  Continue with the same treatment with no changes.    2.  Long-term high-risk medications, on sulfasalazine for management of inflammatory arthritis.  I am monitoring for side effects.      Cancer screening:  Colonoscopy Due 2019  ;PAP;  2017  Mammogram; 11/13/2018   Bone health: calcium and vitamin D: YES, DXA scan;  YES 5/15/17  Vaccines: Flu: NO  ;PNA: NO ;Zoster: NO  (refuses vacc) 1/7/19..[facility]  X-rays of the chest; 11/6/18 Hands; 11/6/18  Feet: Rt Foot 11/6/18  Hepatitis panel, HIV, QTB/PPD:  HEp, HIV and TB 11/6/18    3.  Elevated alkaline phosphatase.  Unchanged.  Follow with PCP.    4.  Osteoarthritis of the knees right.  Deteriorated.  Discussed regarding proceeding with a corticosteroid injection or Visco supplementation.  She declined.  Discussed regarding PT.  We will attempt  to get an MRI of the knee.  Discussed that the patient may need to hold to PT prior to getting this test done.    5..  Osteopenia with high risk fracture.  Bone densitometry report is incomplete.  However, it does state that there is a 10-year risk of MOF 20% and hip fracture of 7%.  We will start her on Fosamax.  She needs to take calcium and vitamin D as directed.    Plan:  Continue with sulfasalazine 500 g p.o. 2 tabs p.o. twice daily  Home exercises for knee pain  MRI of the right knee  Start Fosamax 70 mg p.o. once a week  Take calcium and vitamin D will check vitamin D in the next visit  Monitor liver function test  RTC in 4 months or sooner if needed.

## 2019-09-18 NOTE — TELEPHONE ENCOUNTER
Patients daughter left VM on MA line, stated that patients heart rate was running in high 30's and low 40's. Looks like patient is now in ER.Please advise.

## 2019-09-19 NOTE — H&P
"[facility] HOSPITALIST     Keith Alston MD    CHIEF COMPLAINT:     Chief Complaint   Patient presents with   • Weakness - Generalized           HISTORY OF PRESENT ILLNESS:    HPI  73 year old female presented with complaints of general weakness \" off balance \" , dizziness and near syncope. She denies dyspnea, chest pain, nausea or diaphoresis . She reports she was at her rheumatologist today and they were concerned about her HR. She presented with sinus bradycardia in 40's and some Bigeminy. She reports she had this problem before and Dr Winn of cardiology decreased her BB but has not helped. Troponin negative today . CT head per radiology no acute findings. She denies confusion, visual disturbance, headache slurred speech and no focal deficits. She has chronic right sided mild weakness secondary to CVA in past. K is 3.5. Review of records shows a cardiac cath in June which showed a totally occluded SVG to RCA, - medical management advised. EF 55%. Cardiology was considering a loop recorder id syncope persisted. PMH of stable seizure disorder, CAD s/p CABG, CVA, hypothyroidism, HLD,HTN GERD and rheumatoid arthritis. She was given Lopressor in ED and will be admitted for cardiac monitoring of arrhythmia and cardiology consulted. K will be repalced.     Past Medical History:   Diagnosis Date   • Arthritis    • Atrial fibrillation (CMS/HCC)    • Bronchitis    • Chickenpox    • COPD (chronic obstructive pulmonary disease) (CMS/HCC)    • Cramps of lower extremity     bilateral with weakness   • Heart disease    • Hepatitis C 2013    treatment x 11 months   • High cholesterol    • History of degenerative disc disease    • HLD (hyperlipidemia)    • HTN (hypertension)    • Hypothyroidism    • Measles    • Seizure disorder (CMS/HCC)    • Skin melanoma (CMS/HCC) 2013    s/p lymph node removal on the right   • Sleep apnea    • Stroke (CMS/HCC)    • Whooping cough      Past Surgical History: "   Procedure Laterality Date   • APPENDECTOMY     • AXILLARY NODE DISSECTION Right 05/2014   • CARDIAC CATHETERIZATION N/A 6/19/2019    Procedure: Left Heart Cath;  Surgeon: Real Winn MD;  Location: [facility] CATH INVASIVE LOCATION;  Service: Cardiovascular   • CARDIAC CATHETERIZATION N/A 6/19/2019    Procedure: Saphenous Vein Graft;  Surgeon: Real Winn MD;  Location: [facility] CATH INVASIVE LOCATION;  Service: Cardiovascular   • CARDIAC CATHETERIZATION N/A 6/19/2019    Procedure: Left ventriculography;  Surgeon: Real Winn MD;  Location: [facility] CATH INVASIVE LOCATION;  Service: Cardiovascular   • CARDIAC CATHETERIZATION N/A 6/19/2019    Procedure: Coronary angiography;  Surgeon: Real Winn MD;  Location: [facility] CATH INVASIVE LOCATION;  Service: Cardiovascular   • CATARACT EXTRACTION  01/2017    x 2   • CHOLECYSTECTOMY  2004   • COLONOSCOPY  04/05/2017   • CORONARY ANGIOPLASTY WITH STENT PLACEMENT      stent placement after CABG   • CORONARY ARTERY BYPASS GRAFT  01/20/2010   • CORONARY ARTERY BYPASS GRAFT     • FEMORAL POPLITEAL BYPASS Right 07/20/2017    Dr. Palmer   • INCISIONAL HERNIA REPAIR  09/28/2016    lap. Dr. West   • MOLE REMOVAL Right     arm   • OTHER SURGICAL HISTORY      laser surgery X 2   • PARTIAL HYSTERECTOMY     • THYROIDECTOMY, PARTIAL      goiter removed 1/2 thyroid     Family History   Problem Relation Age of Onset   • Hypertension Mother    • Cancer Mother    • Gout Mother    • Asthma Mother    • Cancer Father    • Heart disease Brother    • Diabetes Other    • Cancer Other    • Tuberculosis Other      Social History     Tobacco Use   • Smoking status: Former Smoker     Last attempt to quit: 6/17/2009     Years since quitting: 10.2   • Smokeless tobacco: Never Used   Substance Use Topics   • Alcohol use: Yes     Frequency: Monthly or less     Comment: beer   • Drug use: No     Medications Prior to Admission   Medication Sig Dispense Refill Last Dose   • aspirin 81 MG  chewable tablet Chew 1 tablet Daily. 30 tablet 0 9/18/2019 at Unknown time   • atorvastatin (LIPITOR) 10 MG tablet Take 20 mg by mouth Every Night.   9/17/2019 at Unknown time   • B Complex Vitamins (VITAMIN B COMPLEX) capsule capsule Take 1 capsule by mouth Daily.   9/18/2019 at Unknown time   • Cholecalciferol 27523 units tablet Take 2,000 Units by mouth 2 (Two) Times a Day.   9/18/2019 at Unknown time   • clopidogrel (PLAVIX) 75 MG tablet Take 75 mg by mouth Daily.   9/18/2019 at Unknown time   • cyanocobalamin (VITAMIN B-12) 250 MCG tablet Take 250 mcg by mouth Daily.   9/18/2019 at Unknown time   • ezetimibe (ZETIA) 10 MG tablet Take 10 mg by mouth Daily.   9/18/2019 at Unknown time   • hydrochlorothiazide (MICROZIDE) 12.5 MG capsule Take 12.5 mg by mouth Daily.   9/18/2019 at Unknown time   • levETIRAcetam (KEPPRA) 500 MG tablet Take 500 mg by mouth 2 (Two) Times a Day.   9/18/2019 at Unknown time   • levothyroxine (SYNTHROID, LEVOTHROID) 25 MCG tablet Take 25 mcg by mouth Daily.   9/18/2019 at Unknown time   • metoprolol succinate XL (TOPROL-XL) 100 MG 24 hr tablet Take 1 tablet by mouth Daily. 30 tablet 5 9/18/2019 at Unknown time   • pantoprazole (PROTONIX) 20 MG EC tablet Take 20 mg by mouth Daily.   9/18/2019 at Unknown time   • pregabalin (LYRICA) 100 MG capsule Take 100 mg by mouth 3 (Three) Times a Day.   9/18/2019 at Unknown time   • sulfaSALAzine (AZULFIDINE) 500 MG tablet Take 1,000 mg by mouth 2 (Two) Times a Day.   9/18/2019 at Unknown time   • alendronate (FOSAMAX) 70 MG tablet Take 1 tablet by mouth 1 (One) Time Per Week. 4 tablet 3      Allergies:  Penicillamine and Penicillins      There is no immunization history on file for this patient.        REVIEW OF SYSTEMS:     Review of Systems   Constitution: Positive for weakness.   HENT: Negative.    Eyes: Negative.    Cardiovascular: Positive for near-syncope.   Respiratory: Negative.    Hematologic/Lymphatic: Negative.    Skin: Negative.   "  Musculoskeletal: Positive for muscle weakness.        Chronic right from old CVA   Gastrointestinal: Negative.    Genitourinary: Negative.    Neurological: Positive for disturbances in coordination, dizziness, light-headedness and loss of balance.   Psychiatric/Behavioral: Negative.    Allergic/Immunologic: Negative.        Vital Signs  Temp:  [97.4 °F (36.3 °C)-97.8 °F (36.6 °C)] 97.6 °F (36.4 °C)  Heart Rate:  [44-64] 52  Resp:  [14-16] 14  BP: ()/(53-88) 93/56    Flowsheet Rows      First Filed Value   Admission Height  157.5 cm (62\") Documented at 09/18/2019 1607   Admission Weight  77.2 kg (170 lb 3.1 oz) Documented at 09/18/2019 1607           Physical Exam:    Physical Exam   Constitutional: She is oriented to person, place, and time.   Obese BMI 31   HENT:   Head: Normocephalic and atraumatic.   Eyes: EOM are normal.   Neck: Normal range of motion. Neck supple.   Cardiovascular: Regular rhythm and normal heart sounds.   Sinus bradycardia    Pulmonary/Chest: Effort normal and breath sounds normal.   Abdominal: Soft. Bowel sounds are normal.   Musculoskeletal: Normal range of motion.   Neurological: She is alert and oriented to person, place, and time.   Skin: Skin is warm and dry.   Psychiatric: She has a normal mood and affect. Her behavior is normal. Judgment and thought content normal.   Vitals reviewed.      Emotional Behavior:    cooperative    Debilities:  Age appropriate      Results Review:    I reviewed the patient's new clinical results.  Lab Results (most recent)     Procedure Component Value Units Date/Time    Comprehensive Metabolic Panel [[id number]]  (Abnormal) Collected:  09/18/19 1659    Specimen:  Blood Updated:  09/18/19 1805     Glucose 97 mg/dL      BUN 10 mg/dL      Creatinine 0.90 mg/dL      Sodium 136 mmol/L      Potassium 3.5 mmol/L      Chloride 104 mmol/L      CO2 23.0 mmol/L      Calcium 8.4 mg/dL      Total Protein 6.5 g/dL      Albumin 3.70 g/dL      ALT (SGPT) 15 U/L      " AST (SGOT) 21 U/L      Alkaline Phosphatase 93 U/L      Total Bilirubin 0.7 mg/dL      eGFR Non African Amer 62 mL/min/1.73      Globulin 2.8 gm/dL      A/G Ratio 1.3 g/dL      BUN/Creatinine Ratio 11.1     Anion Gap 12.5 mmol/L     Narrative:       The MDRD GFR formula is only valid for adults with stable renal function between ages 18 and 70.    Troponin [[id number]]  (Normal) Collected:  09/18/19 1659    Specimen:  Blood Updated:  09/18/19 1741     Troponin I <0.030 ng/mL     Narrative:       Troponin I Reference Range:    0.00-0.03  Negative.  Repeat testing in 4-6 hours if clinically indicated.    0.04-0.29  Suspicious for myocardial injury. Serial measurements and clinical  correlation may be necessary to confirm or exclude diagnosis of acute  coronary syndrome.  Repeat testing in 4-6 hours if indicated.     >0.29 Consistent with myocardial injury.  Recommend clinical and laboratory correlation.     Results my be falsely decreased if patient taking Biotin.     Protime-INR [[id number]]  (Normal) Collected:  09/18/19 1659    Specimen:  Blood Updated:  09/18/19 1741     Protime 10.6 Seconds      INR 1.03    aPTT [[id number]]  (Normal) Collected:  09/18/19 1659    Specimen:  Blood Updated:  09/18/19 1741     PTT 25.6 seconds     CBC & Differential [[id number]] Collected:  09/18/19 1659    Specimen:  Blood Updated:  09/18/19 1730    Narrative:       The following orders were created for panel order CBC & Differential.  Procedure                               Abnormality         Status                     ---------                               -----------         ------                     CBC Auto Differential[[id number]]        Abnormal            Final result                 Please view results for these tests on the individual orders.    CBC Auto Differential [[id number]]  (Abnormal) Collected:  09/18/19 1659    Specimen:  Blood Updated:  09/18/19 1730     WBC 6.30 10*3/mm3      RBC 4.68 10*6/mm3      Hemoglobin 14.7  g/dL      Hematocrit 42.9 %      MCV 91.6 fL      MCH 31.4 pg      MCHC 34.3 g/dL      RDW 12.6 %      RDW-SD 40.3 fl      MPV 9.5 fL      Platelets 191 10*3/mm3      Neutrophil % 51.7 %      Lymphocyte % 32.6 %      Monocyte % 12.6 %      Eosinophil % 2.5 %      Basophil % 0.6 %      Neutrophils, Absolute 3.20 10*3/mm3      Lymphocytes, Absolute 2.10 10*3/mm3      Monocytes, Absolute 0.80 10*3/mm3      Eosinophils, Absolute 0.20 10*3/mm3      Basophils, Absolute 0.00 10*3/mm3      nRBC 0.0 /100 WBC           Imaging Results (most recent)     Procedure Component Value Units Date/Time    MRI Brain Without Contrast [[id number]] Collected:  09/18/19 1743     Updated:  09/18/19 1755    Narrative:          DATE OF EXAM:   9/18/2019 5:10 PM     PROCEDURE:   MRI BRAIN WO CONTRAST-     INDICATIONS:   hx stroke, off balance, dizziness, history of melanoma     COMPARISON:  No Comparisons Available     TECHNIQUE:   Routine magnetic resonance imaging of the brain was performed without  administration of contrast.     FINDINGS:   There is no abnormal diffusion restriction to suggest acute ischemia.  There is prominence of the ventricles and cortical sulci consistent with  generalized cerebral atrophy. Moderate areas of periventricular and  subcortical white matter T2/FLAIR hyperintense signal similar to prior  study most consistent with changes of chronic small vessel disease.  There is evidence of prior lacunar infarction involving the left  cerebellum which appears stable. There is no abnormal extra-axial fluid  collection. No findings of intracranial hemorrhage. The major T2  weighted intracranial vascular flow voids are patent. Paranasal sinuses  well aerated. Negative for mastoid effusion. Pituitary gland normal in  size. Corpus callosum and midline structures within normal limits.  Negative for Chiari malformation.       Impression:       1. Negative for acute ischemia, mass, or hemorrhage.  2. Stable chronic findings  above.     Electronically Signed By-Cedric Lehman On:2019 5:52 PM  This report was finalized on [id number] by  Cedric Lehman, .        reviewed    ECG/EMG Results (most recent)     Procedure Component Value Units Date/Time    ECG 12 Lead [[id number]] Collected:  19 163     Updated:  19    Narrative:       HEART RATE= 61  bpm  RR Interval= 984  ms  IA Interval= 187  ms  P Horizontal Axis= -8  deg  P Front Axis= 74  deg  QRSD Interval= 92  ms  QT Interval= 445  ms  QRS Axis= 12  deg  T Wave Axis= 42  deg  - ABNORMAL ECG -  Sinus rhythm  Ventricular trigeminy  Probable left atrial enlargement  Electronically Signed By:   Date and Time of Study: 2019 16:37:04        reviewed    Results for orders placed during the hospital encounter of 10/24/18   Duplex Lower Extremity Art / Grafts - Bilateral CAR    Narrative                  Lower Extremity Arterial Report                          [facility]                         Vascular Laboratory                            [address] [place] [address]    Name: HERNESTO CARNES                Study Date: 10/24/2018 11:14 AM  MRN: [id number]                       Patient Location:   : 1945 (M/d/yyyy)           Gender: Female  Age: 72 yrs                          Account#: [id number]  Reason For Study: right fem-pop graft surveillance      Procedure  Patient has a right femoral-popliteal bypass graft. Technically satisfactory  study.    Ankle-Brachial Index (GERSON)  Normal segmental pressures at the ankle and toe levels of both lower  extremities.  GERSON and toe index are normal on both sides.  PVR findings show good amplitude of the waveform.  Doppler waveforms are triphasic on the bilaterally.    Rt Arterial Graft Scan  The native inflow velocity is 76.  The native outflow velocity is 59.  The proximal anastomosis velocity is 60.  The proximal graft velocity is 76.  The mid graft velocity is 51.  The distal  graft velocity is 53.  The distal anastomosis velocity is 41.      Interpretation Summary  Normal ankle brachial indices bilaterally.  _______________________________________________________________________________    Electronically signed by: Domingo Palmer MD  on 10/28/2018 02:29 PM    Ordering Physician: DEVEN BORDEN  Referring Physician: LUCILLE WILLINGHAM  Performed By: LEFTY         Results for orders placed during the hospital encounter of 06/17/19   Adult Transthoracic Echo Complete W/ Cont if Necessary Per Protocol    Narrative Technically satisfactory study.  Mitral valve is thickened with adequate opening motion.  Aortic valve is normal.  Tricuspid valve is normal.  Left atrium is normal in size.  Left ventricle is normal in size and contractility with ejection fraction   of 60%.  Atrial septum is intact.  No pericardial effusion or intracardiac thrombus is seen.  The right atrium and right ventricle are normal.  Aorta is normal.    Impression  Thickened mitral valve with adequate opening motion.  Normal left ventricle size and contractility with ejection fraction of   60%.       MRI Brain Without Contrast  Narrative:    DATE OF EXAM:   9/18/2019 5:10 PM     PROCEDURE:   MRI BRAIN WO CONTRAST-     INDICATIONS:   hx stroke, off balance, dizziness, history of melanoma     COMPARISON:  No Comparisons Available     TECHNIQUE:   Routine magnetic resonance imaging of the brain was performed without  administration of contrast.     FINDINGS:   There is no abnormal diffusion restriction to suggest acute ischemia.  There is prominence of the ventricles and cortical sulci consistent with  generalized cerebral atrophy. Moderate areas of periventricular and  subcortical white matter T2/FLAIR hyperintense signal similar to prior  study most consistent with changes of chronic small vessel disease.  There is evidence of prior lacunar infarction involving the left  cerebellum which appears stable. There is no abnormal  extra-axial fluid  collection. No findings of intracranial hemorrhage. The major T2  weighted intracranial vascular flow voids are patent. Paranasal sinuses  well aerated. Negative for mastoid effusion. Pituitary gland normal in  size. Corpus callosum and midline structures within normal limits.  Negative for Chiari malformation.     Impression: 1. Negative for acute ischemia, mass, or hemorrhage.  2. Stable chronic findings above.     Electronically Signed By-Cedric Lehman On:9/18/2019 5:52 PM  This report was finalized on [id number] by  Cedric Lehman, .      Assessment/Plan       Near syncope      Plan    Near Syncope - CT head negative - likely cardiac related EKG shows sinus bradycardia with bigeminy intermittently. Has seen Gondi for similar complaints recently and BB decreased without relief .Aspirin , Statin , serial troponin    CAD s/p remote CABG and  cardiac cath June 2019 SVG to RCA totally occluded per report - medical treatment advised - continuous monitoring and continue home meds, cardiology consulted for above denies chest pain or dyspnea.aspirin statin BB .    Mild hypokalemia 3.5- K replacement     HTN- stable on BB and HCTZ, monitor     HX CVA  With residual right sided weakness- continue home meds , CT head negative - no focal deficits , slurred speech headache or confusion.    Hypothyroidism - on synthroid consider TSH    GERD- on PPI    Rheumatoid arthritis stable - continue home meds Sulfasalazine,    DJD- on Fosamax     Seizure disorder -stable none in 3 years - on Keppra     Peripheral neuropathy - on Lyrica     DVT [prophylaxis - scd       Disposition     Patient will be admitted for cardiac monitoring and BP  Monitoring with cardiology consulted.    I discussed the patients findings and my recommendations with patient .     James Schroeder MD  09/19/19  3:33 AM        Patient seen and examined at this time I agree with the dog condition above.  Patient will need cardiology evaluate for  bradycardia and possible syncope

## 2019-09-19 NOTE — PLAN OF CARE
Problem: Patient Care Overview  Goal: Plan of Care Review  Outcome: Ongoing (interventions implemented as appropriate)   09/19/19 6173   Coping/Psychosocial   Plan of Care Reviewed With patient   Plan of Care Review   Progress no change   OTHER   Outcome Summary Dr Winn to adjust meds and monitor overnight. He states will re-assess potential need for pacemaker in am, no c/o pain, HR amy o/w VSS, continue to monitor     Goal: Individualization and Mutuality  Outcome: Outcome(s) achieved Date Met: 09/19/19    Goal: Discharge Needs Assessment  Outcome: Ongoing (interventions implemented as appropriate)    Goal: Interprofessional Rounds/Family Conf  Outcome: Ongoing (interventions implemented as appropriate)      Problem: Fall Risk (Adult)  Goal: Identify Related Risk Factors and Signs and Symptoms  Outcome: Ongoing (interventions implemented as appropriate)    Goal: Absence of Fall  Outcome: Ongoing (interventions implemented as appropriate)      Problem: Syncope (Adult)  Goal: Identify Related Risk Factors and Signs and Symptoms  Outcome: Ongoing (interventions implemented as appropriate)    Goal: Physical Safety/Health Maintenance  Outcome: Ongoing (interventions implemented as appropriate)    Goal: Optimal Emotional/Functional [place]  Outcome: Ongoing (interventions implemented as appropriate)

## 2019-09-19 NOTE — PROGRESS NOTES
Discharge Planning Assessment   Ziggy     Patient Name: Delphine Rob  MRN: [id number]  Today's Date: 9/19/2019    Admit Date: 9/18/2019    Discharge Needs Assessment     Row Name 09/19/19 1223       Living Environment    Lives With  -- Patient states she lives at home with her daughter     Current Living Arrangements  home/apartment/condo    Primary Care Provided by  self;child(susi)    Able to Return to Prior Arrangements  yes       Resource/Environmental Concerns    Resource/Environmental Concerns  none    Transportation Concerns  car, none       Transition Planning    Patient/Family Anticipates Transition to  home with family    Patient/Family Anticipated Services at Transition  none    Transportation Anticipated  family or friend will provide       Discharge Needs Assessment    Readmission Within the Last 30 Days  no previous admission in last 30 days    Concerns to be Addressed  no discharge needs identified    Equipment Currently Used at Home  cane, straight;rollator;wheelchair, motorized;oxygen;nebulizer;bipap/cpap Uses oxygen PRN at 2.5 L - Cannot remember the name of company        Discharge Plan     Row Name 09/19/19 1227       Plan    Plan  ANticipate routine d/c to home with daughter                Demographic Summary     Row Name 09/19/19 1222       General Information    Admission Type  inpatient    Arrived From  emergency department    Referral Source  admission list    Reason for Consult  discharge planning    Preferred Language  English        Functional Status     Row Name 09/19/19 1223       Functional Status, IADL    Medications  assistive person    Meal Preparation  assistive person    Housekeeping  assistive person    Laundry  assistive person    Shopping  assistive person        D/C Barriers - Observation for Med Changes per cardiologist       Angelica Brewer RN, CM  Office Phone [phone]  Cell [phone]

## 2019-09-19 NOTE — CONSULTS
Referring Provider: Pam Pete MD  Reason for Consultation:   Status post CABG  Dizziness  Hypertension  Hypothyroidism    Patient Care Team:  Keith Alston MD as PCP - General    Chief complaint   Slow heart rate  Dizziness    Subjective .  Healing okay today    History of present illness:  Delphine Rob is a 73 y.o. female who presents with feeling of being off balance and having weakness and feeling of dizziness and near syncope.  Patient denies having any chest discomfort shortness of breath nausea vomiting sweating.  Patient was seen by her rheumatologist and her heart rate was in the 40s and patient was seen in the emergency room.  Patient was admitted to the hospital.  Troponin level was negative.  CT scan of the head was negative for any acute problems.  Patient had a history of stroke and she has been having some visual disturbances and having no focal deficits.  Patient was found to have premature ventricular contractions and hypokalemia with potassium of 3.5.  Cardiac cath June 2019 revealed totally occluded SVG to RCA with normal left ventricular function.  No other associated aggravating or alleviating factors.  Please see the assessment for summary.       ROS      Since I have last seen, the patient has been without any chest discomfort ,shortness of breath, palpitations or syncope.  Denies having any headache ,abdominal pain ,nausea, vomiting , diarrhea constipation, loss of weight or loss of appetite.  Denies having any excessive bruising ,hematuria or blood in the stool.    Review of systems negative except as indicated      History  Past Medical History:   Diagnosis Date   • Arthritis    • Atrial fibrillation (CMS/HCC)    • Bronchitis    • Chickenpox    • COPD (chronic obstructive pulmonary disease) (CMS/HCC)    • Cramps of lower extremity     bilateral with weakness   • Heart disease    • Hepatitis C 2013    treatment x 11 months   • High cholesterol    • History of degenerative disc  disease    • HLD (hyperlipidemia)    • HTN (hypertension)    • Hypothyroidism    • Measles    • Seizure disorder (CMS/HCC)    • Skin melanoma (CMS/HCC) 2013    s/p lymph node removal on the right   • Sleep apnea    • Stroke (CMS/HCC)    • Whooping cough        Past Surgical History:   Procedure Laterality Date   • APPENDECTOMY     • AXILLARY NODE DISSECTION Right 05/2014   • CARDIAC CATHETERIZATION N/A 6/19/2019    Procedure: Left Heart Cath;  Surgeon: Real Winn MD;  Location:  [facility];  Service: Cardiovascular   • CARDIAC CATHETERIZATION N/A 6/19/2019    Procedure: Saphenous Vein Graft;  Surgeon: Real Winn MD;  Location:  [facility];  Service: Cardiovascular   • CARDIAC CATHETERIZATION N/A 6/19/2019    Procedure: Left ventriculography;  Surgeon: Real Winn MD;  Location:  [facility];  Service: Cardiovascular   • CARDIAC CATHETERIZATION N/A 6/19/2019    Procedure: Coronary angiography;  Surgeon: Real Winn MD;  Location:  [facility];  Service: Cardiovascular   • CATARACT EXTRACTION  01/2017    x 2   • CHOLECYSTECTOMY  2004   • COLONOSCOPY  04/05/2017   • CORONARY ANGIOPLASTY WITH STENT PLACEMENT      stent placement after CABG   • CORONARY ARTERY BYPASS GRAFT  01/20/2010   • CORONARY ARTERY BYPASS GRAFT     • FEMORAL POPLITEAL BYPASS Right 07/20/2017    Dr. Palmer   • INCISIONAL HERNIA REPAIR  09/28/2016    lap. Dr. West   • MOLE REMOVAL Right     arm   • OTHER SURGICAL HISTORY      laser surgery X 2   • PARTIAL HYSTERECTOMY     • THYROIDECTOMY, PARTIAL      goiter removed 1/2 thyroid       Family History   Problem Relation Age of Onset   • Hypertension Mother    • Cancer Mother    • Gout Mother    • Asthma Mother    • Cancer Father    • Heart disease Brother    • Diabetes Other    • Cancer Other    • Tuberculosis Other        Social History     Tobacco Use   • Smoking status: Former Smoker     Last attempt to  quit: 6/17/2009     Years since quitting: 10.2   • Smokeless tobacco: Never Used   Substance Use Topics   • Alcohol use: Yes     Frequency: Monthly or less     Comment: beer   • Drug use: No        Medications Prior to Admission   Medication Sig Dispense Refill Last Dose   • aspirin 81 MG chewable tablet Chew 1 tablet Daily. 30 tablet 0 9/18/2019 at Unknown time   • atorvastatin (LIPITOR) 10 MG tablet Take 20 mg by mouth Every Night.   9/17/2019 at Unknown time   • B Complex Vitamins (VITAMIN B COMPLEX) capsule capsule Take 1 capsule by mouth Daily.   9/18/2019 at Unknown time   • Cholecalciferol 70674 units tablet Take 2,000 Units by mouth 2 (Two) Times a Day.   9/18/2019 at Unknown time   • clopidogrel (PLAVIX) 75 MG tablet Take 75 mg by mouth Daily.   9/18/2019 at Unknown time   • cyanocobalamin (VITAMIN B-12) 250 MCG tablet Take 250 mcg by mouth Daily.   9/18/2019 at Unknown time   • ezetimibe (ZETIA) 10 MG tablet Take 10 mg by mouth Daily.   9/18/2019 at Unknown time   • hydrochlorothiazide (MICROZIDE) 12.5 MG capsule Take 12.5 mg by mouth Daily.   9/18/2019 at Unknown time   • levETIRAcetam (KEPPRA) 500 MG tablet Take 500 mg by mouth 2 (Two) Times a Day.   9/18/2019 at Unknown time   • levothyroxine (SYNTHROID, LEVOTHROID) 25 MCG tablet Take 25 mcg by mouth Daily.   9/18/2019 at Unknown time   • metoprolol succinate XL (TOPROL-XL) 100 MG 24 hr tablet Take 1 tablet by mouth Daily. 30 tablet 5 9/18/2019 at Unknown time   • pantoprazole (PROTONIX) 20 MG EC tablet Take 20 mg by mouth Daily.   9/18/2019 at Unknown time   • pregabalin (LYRICA) 100 MG capsule Take 100 mg by mouth 3 (Three) Times a Day.   9/18/2019 at Unknown time   • sulfaSALAzine (AZULFIDINE) 500 MG tablet Take 1,000 mg by mouth 2 (Two) Times a Day.   9/18/2019 at Unknown time   • alendronate (FOSAMAX) 70 MG tablet Take 1 tablet by mouth 1 (One) Time Per Week. 4 tablet 3          Penicillamine and Penicillins    Scheduled Meds:  aspirin 81 mg Oral  "Daily   atorvastatin 20 mg Oral Nightly   clopidogrel 75 mg Oral Daily   hydrochlorothiazide 12.5 mg Oral Daily   levETIRAcetam 500 mg Oral Q12H   levothyroxine 25 mcg Oral Q AM   metoprolol succinate  mg Oral Daily   metoprolol tartrate 2.5 mg Intravenous Q6H   pantoprazole 40 mg Oral Q AM   pregabalin 100 mg Oral TID   sodium chloride 10 mL Intravenous Q12H   sulfaSALAzine 1,000 mg Oral BID     Continuous Infusions:   PRN Meds:.magnesium sulfate **OR** magnesium sulfate **OR** magnesium sulfate  •  potassium chloride **OR** potassium chloride **OR** potassium chloride  •  sodium chloride    Objective     VITAL SIGNS  Vitals:    09/18/19 2056 09/18/19 2300 09/18/19 2301 09/18/19 2302   BP: 137/86 118/64 112/53 93/56   BP Location: Left arm Left arm Left arm Left arm   Patient Position: Lying Lying Sitting Standing   Pulse: 54 52     Resp: 14 14     Temp: 97.4 °F (36.3 °C) 97.6 °F (36.4 °C)     TempSrc: Oral Oral     SpO2: 96% 97%     Weight: 75.9 kg (167 lb 5.3 oz)      Height: 157.5 cm (62\")          Flowsheet Rows      First Filed Value   Admission Height  157.5 cm (62\") Documented at 09/18/2019 1607   Admission Weight  77.2 kg (170 lb 3.1 oz) Documented at 09/18/2019 1607           TELEMETRY: Sinus bradycardia    Physical Exam:  The patient is alert, oriented and in no distress.  Vital signs as noted above.  Head and neck revealed no carotid bruits or jugular venous distention.  No thyromegaly or lymph adenopathy is present  Lungs clear.  No wheezing.  Breath sounds are normal bilaterally.  Heart normal first and second heart sounds.No murmur.  No precordial rub is present.  No gallop is present.  Abdomen soft and nontender.  No organomegaly is present.  Extremities with good peripheral pulses without any pedal edema.  Skin warm and dry.  Musculoskeletal system is grossly normal  CNS grossly normal      Results Review:   I reviewed the patient's new clinical results.  Lab Results (last 24 hours)     " Procedure Component Value Units Date/Time    Troponin [[id number]]  (Normal) Collected:  09/18/19 2229    Specimen:  Blood Updated:  09/18/19 2346     Troponin I <0.030 ng/mL     Narrative:       Troponin I Reference Range:    0.00-0.03  Negative.  Repeat testing in 4-6 hours if clinically indicated.    0.04-0.29  Suspicious for myocardial injury. Serial measurements and clinical  correlation may be necessary to confirm or exclude diagnosis of acute  coronary syndrome.  Repeat testing in 4-6 hours if indicated.     >0.29 Consistent with myocardial injury.  Recommend clinical and laboratory correlation.     Results my be falsely decreased if patient taking Biotin.     Comprehensive Metabolic Panel [[id number]]  (Abnormal) Collected:  09/18/19 1659    Specimen:  Blood Updated:  09/18/19 1805     Glucose 97 mg/dL      BUN 10 mg/dL      Creatinine 0.90 mg/dL      Sodium 136 mmol/L      Potassium 3.5 mmol/L      Chloride 104 mmol/L      CO2 23.0 mmol/L      Calcium 8.4 mg/dL      Total Protein 6.5 g/dL      Albumin 3.70 g/dL      ALT (SGPT) 15 U/L      AST (SGOT) 21 U/L      Alkaline Phosphatase 93 U/L      Total Bilirubin 0.7 mg/dL      eGFR Non African Amer 62 mL/min/1.73      Globulin 2.8 gm/dL      A/G Ratio 1.3 g/dL      BUN/Creatinine Ratio 11.1     Anion Gap 12.5 mmol/L     Narrative:       The MDRD GFR formula is only valid for adults with stable renal function between ages 18 and 70.    Troponin [[id number]]  (Normal) Collected:  09/18/19 1659    Specimen:  Blood Updated:  09/18/19 1741     Troponin I <0.030 ng/mL     Narrative:       Troponin I Reference Range:    0.00-0.03  Negative.  Repeat testing in 4-6 hours if clinically indicated.    0.04-0.29  Suspicious for myocardial injury. Serial measurements and clinical  correlation may be necessary to confirm or exclude diagnosis of acute  coronary syndrome.  Repeat testing in 4-6 hours if indicated.     >0.29 Consistent with myocardial injury.  Recommend clinical  and laboratory correlation.     Results my be falsely decreased if patient taking Biotin.     Protime-INR [[id number]]  (Normal) Collected:  09/18/19 1659    Specimen:  Blood Updated:  09/18/19 1741     Protime 10.6 Seconds      INR 1.03    aPTT [[id number]]  (Normal) Collected:  09/18/19 1659    Specimen:  Blood Updated:  09/18/19 1741     PTT 25.6 seconds     CBC & Differential [[id number]] Collected:  09/18/19 1659    Specimen:  Blood Updated:  09/18/19 1730    Narrative:       The following orders were created for panel order CBC & Differential.  Procedure                               Abnormality         Status                     ---------                               -----------         ------                     CBC Auto Differential[[id number]]        Abnormal            Final result                 Please view results for these tests on the individual orders.    CBC Auto Differential [[id number]]  (Abnormal) Collected:  09/18/19 1659    Specimen:  Blood Updated:  09/18/19 1730     WBC 6.30 10*3/mm3      RBC 4.68 10*6/mm3      Hemoglobin 14.7 g/dL      Hematocrit 42.9 %      MCV 91.6 fL      MCH 31.4 pg      MCHC 34.3 g/dL      RDW 12.6 %      RDW-SD 40.3 fl      MPV 9.5 fL      Platelets 191 10*3/mm3      Neutrophil % 51.7 %      Lymphocyte % 32.6 %      Monocyte % 12.6 %      Eosinophil % 2.5 %      Basophil % 0.6 %      Neutrophils, Absolute 3.20 10*3/mm3      Lymphocytes, Absolute 2.10 10*3/mm3      Monocytes, Absolute 0.80 10*3/mm3      Eosinophils, Absolute 0.20 10*3/mm3      Basophils, Absolute 0.00 10*3/mm3      nRBC 0.0 /100 WBC           Imaging Results (last 24 hours)     Procedure Component Value Units Date/Time    MRI Brain Without Contrast [[id number]] Collected:  09/18/19 1743     Updated:  09/18/19 1755    Narrative:          DATE OF EXAM:   9/18/2019 5:10 PM     PROCEDURE:   MRI BRAIN WO CONTRAST-     INDICATIONS:   hx stroke, off balance, dizziness, history of melanoma     COMPARISON:  No  Comparisons Available     TECHNIQUE:   Routine magnetic resonance imaging of the brain was performed without  administration of contrast.     FINDINGS:   There is no abnormal diffusion restriction to suggest acute ischemia.  There is prominence of the ventricles and cortical sulci consistent with  generalized cerebral atrophy. Moderate areas of periventricular and  subcortical white matter T2/FLAIR hyperintense signal similar to prior  study most consistent with changes of chronic small vessel disease.  There is evidence of prior lacunar infarction involving the left  cerebellum which appears stable. There is no abnormal extra-axial fluid  collection. No findings of intracranial hemorrhage. The major T2  weighted intracranial vascular flow voids are patent. Paranasal sinuses  well aerated. Negative for mastoid effusion. Pituitary gland normal in  size. Corpus callosum and midline structures within normal limits.  Negative for Chiari malformation.       Impression:       1. Negative for acute ischemia, mass, or hemorrhage.  2. Stable chronic findings above.     Electronically Signed By-Cedric Lehman On:9/18/2019 5:52 PM  This report was finalized on [id number] by  Cedric Lehman, .      LAB RESULTS (LAST 7 DAYS)    CBC  Results from last 7 days   Lab Units 09/18/19  1659   WBC 10*3/mm3 6.30   RBC 10*6/mm3 4.68   HEMOGLOBIN g/dL 14.7   HEMATOCRIT % 42.9   MCV fL 91.6   PLATELETS 10*3/mm3 191       BMP  Results from last 7 days   Lab Units 09/18/19  1659   SODIUM mmol/L 136   POTASSIUM mmol/L 3.5*   CHLORIDE mmol/L 104   CO2 mmol/L 23.0   BUN mg/dL 10   CREATININE mg/dL 0.90   GLUCOSE mg/dL 97       CMP Results from last 7 days   Lab Units 09/18/19  1659   SODIUM mmol/L 136   POTASSIUM mmol/L 3.5*   CHLORIDE mmol/L 104   CO2 mmol/L 23.0   BUN mg/dL 10   CREATININE mg/dL 0.90   GLUCOSE mg/dL 97   ALBUMIN g/dL 3.70   BILIRUBIN mg/dL 0.7   ALK PHOS U/L 93*   AST (SGOT) U/L 21   ALT (SGPT) U/L 15         BNP         TROPONIN  Results from last 7 days   Lab Units 09/18/19  2229   TROPONIN I ng/mL <0.030       CoAg  Results from last 7 days   Lab Units 09/18/19  1659   INR  1.03   APTT seconds 25.6       Creatinine Clearance  Estimated Creatinine Clearance: 53.9 mL/min (by C-G formula based on SCr of 0.9 mg/dL).    ABG        Radiology  Mri Brain Without Contrast    Result Date: 9/18/2019  1. Negative for acute ischemia, mass, or hemorrhage. 2. Stable chronic findings above.  Electronically Signed By-Cedric Lehman On:9/18/2019 5:52 PM This report was finalized on [id number] by  Cedric Lehman, .              EKG              I personally viewed and interpreted the patient's EKG/Telemetry data: Sinus bradycardia premature ventricular contractions    ECHOCARDIOGRAM:    Results for orders placed during the hospital encounter of 06/17/19   Adult Transthoracic Echo Complete W/ Cont if Necessary Per Protocol    Narrative Technically satisfactory study.  Mitral valve is thickened with adequate opening motion.  Aortic valve is normal.  Tricuspid valve is normal.  Left atrium is normal in size.  Left ventricle is normal in size and contractility with ejection fraction   of 60%.  Atrial septum is intact.  No pericardial effusion or intracardiac thrombus is seen.  The right atrium and right ventricle are normal.  Aorta is normal.    Impression  Thickened mitral valve with adequate opening motion.  Normal left ventricle size and contractility with ejection fraction of   60%.               Cardiolite (Tc-99m Sestamibi) stress test      OTHER:     Assessment/Plan     Active Problems:    Near syncope          /////////////////  impression  ---------------------------   Dizziness and near syncope.  Patient has sinus bradycardia which could be multifactorial including secondary to beta-blocker effect.  Patient has history of intermittent atrial fibrillation.  Tachy bradycardia syndrome.    -Status post CABG 01/20/2010 at [facility].   Status post stent placement after CABG ( details not available).     Cardiac catheterization and coronary angiography 6/19/2019 revealed  Left ventricle size and contractility is normal with proximal inferior wall hypokinesis.  Ejection fraction is 55%.  Left main artery is normal.  Left anterior descending artery is noted in the midsegment with filling of the LAD through LIMA.  Circumflex coronary artery is normal  Right coronary artery is a very small caliber vessel with 99% disease in the midsegment with filling of the distal vessel through collaterals.    RAUSCH to LAD is patent  SVG to marginal branch is patent  SVG to RCA is totally occluded.        Echocardiogram 17 2019 revealed mild mitral regurgitation and normal left ventricular function.  [place]  Carri scan Cardiolite test is negative for myocardial ischemia 11/13/2017     -Intermittent atrial fibrillation.  Patient is maintaining sinus rhythm.       -History of  stroke from which patient has recovered.      -Hypertension dyslipidemia and hypothyroidism      -Status post thyroidectomy for cyst cholecystectomy and hysterectomy.      -History of hepatitis-C      -Allergy to penicillin      -Former smoker quit smoking October 2013.      -Status post  Malignant melanoma removal from right elbow area.  Patient did not need chemotherapy     -history of peripheral vascular disease.  CTA 06/30/2017 revealed right superficial femoral artery occlusion  ---------------.  Plan  ----------------------  Patient is having symptoms of dizziness and near syncope.  Hypokalemia-supplements.  Patient has significant sinus bradycardia some of which could be pseudo-bradycardia with premature ventricle contractions.  However patient has history of intermittent atrial fibrillation in the past which is well controlled with present dose of metoprolol  mg a day.  Patient likely has tachy bradycardia syndrome.  Since patient asymptomatic we will reduce the dose of metoprolol  to 25 mg p.o. twice daily and consider addition of amlodipine to maintain her normal blood pressure.  If however patient has continued problems with tachyarrhythmias especially atrial fibrillation patient would benefit from permanent dual-chamber pacemaker implantation.  Consideration would be given for loop recorder placement.  Patient recently had an echocardiogram in June 2019 and had cardiac catheterization in June 2019  Medications were reviewed and updated.  Close cardiac monitoring.  We will check TSH levels to make sure patient is not under supplemented causing bradycardia..  Further plan will depend on patient's progress.  ]]]]]]]]]]]]]]]]       Real Winn MD  09/19/19  6:24 AM

## 2019-09-19 NOTE — PLAN OF CARE
Problem: Patient Care Overview  Goal: Plan of Care Review  Outcome: Ongoing (interventions implemented as appropriate)   09/19/19 0504   Coping/Psychosocial   Plan of Care Reviewed With patient   Plan of Care Review   Progress no change     Goal: Discharge Needs Assessment  Outcome: Ongoing (interventions implemented as appropriate)   09/19/19 0504   Discharge Needs Assessment   Readmission Within the Last 30 Days no previous admission in last 30 days   Concerns to be Addressed discharge planning   Patient/Family Anticipates Transition to home with family   Patient/Family Anticipated Services at Transition none   Transportation Concerns car, none   Transportation Anticipated family or friend will provide   Disability   Equipment Currently Used at Home cane, straight;wheelchair;nebulizer;oxygen;bipap/cpap       Problem: Fall Risk (Adult)  Goal: Identify Related Risk Factors and Signs and Symptoms  Outcome: Ongoing (interventions implemented as appropriate)   09/19/19 0504   Fall Risk (Adult)   Related Risk Factors (Fall Risk) sensory deficits;impaired vision   Signs and Symptoms (Fall Risk) presence of risk factors     Goal: Absence of Fall  Outcome: Ongoing (interventions implemented as appropriate)   09/19/19 0504   Fall Risk (Adult)   Absence of Fall making progress toward outcome       Problem: Syncope (Adult)  Goal: Identify Related Risk Factors and Signs and Symptoms  Outcome: Ongoing (interventions implemented as appropriate)   09/19/19 0504   Syncope (Adult)   Related Risk Factors (Syncope) dysrhythmia   Signs and Symptoms (Syncope) visual disturbance     Goal: Physical Safety/Health Maintenance   09/19/19 0504   Syncope (Adult)   Physical Safety/Health Maintenance making progress toward outcome     Goal: Optimal Emotional/Functional Edwards  Outcome: Ongoing (interventions implemented as appropriate)   09/19/19 0504   Syncope (Adult)   Optimal Emotional/Functional Edwards making progress toward  outcome

## 2019-09-19 NOTE — PROGRESS NOTES
"Hospitalist Team      Patient Care Team:  Keith Alston MD as PCP - General        Chief Complaint: Near syncope with dizziness and feeling off balance    History of present illness and Hospital course  73 year old female presented with complaints of general weakness \"off balance,\" dizziness and near syncope. She denies dyspnea, chest pain, nausea or diaphoresis. She reports she was at her rheumatologist today and they were concerned about her HR. She presented with sinus bradycardia in 40's and some Bigeminy. She reports she had this problem before and Dr Winn of cardiology decreased her BB but has not helped. Troponin negative today . CT head per radiology no acute findings. She denies confusion, visual disturbance, headache slurred speech and no focal deficits. She has chronic right sided mild weakness secondary to CVA in past. K is 3.5 and replacement was ordered. Review of records shows a cardiac cath in June which showed a totally occluded SVG to RCA, - medical management advised. EF 55%. Cardiology was considering a loop recorder id syncope persisted. PMH of stable seizure disorder, CAD s/p CABG, CVA, hypothyroidism, HLD, HTN, GERD and rheumatoid arthritis. She was given Lopressor in ED and was admitted for cardiac monitoring of arrhythmia and cardiology consulted.      Subjective/Objective  Patient reports continuing to feel poorly with irregular heart rhythm and generalized fatigue and lightheadedness when she gets up.    Review of systems  12 point review of systems was reviewed and was negative except as above.    Vital Signs  Temp:  [97.3 °F (36.3 °C)-97.8 °F (36.6 °C)] 97.3 °F (36.3 °C)  Heart Rate:  [44-64] 56  Resp:  [14-16] 14  BP: ()/(53-88) 122/64  Oxygen Therapy  SpO2: 96 %  Pulse Oximetry Type: Intermittent  Device (Oxygen Therapy): room air  Flowsheet Rows      First Filed Value   Admission Height  157.5 cm (62\") Documented at 09/18/2019 1607   Admission Weight  77.2 kg (170 lb 3.1 " oz) Documented at 09/18/2019 1607        Intake & Output (last 3 days)     None        Lines, Drains & Airways    Active LDAs     Name:   Placement date:   Placement time:   Site:   Days:    Peripheral IV 09/18/19 1658 Left Forearm   09/18/19 1658    Forearm   less than 1                Physical Exam:  Well-developed well-nourished elderly female lying flat in bed awake and alert on room air comfortable in no acute distress; lungs clear to auscultation bilaterally; CV irregular abdomen soft nontender nondistended; extremities with edema or calf tenderness.      Results Review:     I reviewed the patient's new clinical results.    Lab Results (last 24 hours)     Procedure Component Value Units Date/Time    Hemoglobin A1c [[id number]]  (Normal) Collected:  09/19/19 0640    Specimen:  Blood Updated:  09/19/19 0847     Hemoglobin A1C 5.4 %     Narrative:       Hemoglobin A1C Reference Range:    <5.7 %        Normal  5.7-6.4 %     Increased risk for diabetes  > 6.4 %        Diabetes       These guidelines have been recommended by the American Diabetic Association for Hgb A1c.      The following 2010 guidelines have been recommended by the American Diabetes Association for Hemoglobin A1c.    HBA1c 5.7-6.4% Increased risk for future diabetes (pre-diabetes)  HBA1c     >6.4% Diabetes    TSH [[id number]]  (Abnormal) Collected:  09/19/19 0640    Specimen:  Blood Updated:  09/19/19 0822     TSH 5.840 uIU/mL      Comment: Results may be falsely decreased if patient taking Biotin.       T4, Free [[id number]]  (Normal) Collected:  09/19/19 0640    Specimen:  Blood Updated:  09/19/19 0822     Free T4 0.84 ng/dL      Comment: Results may be falsely increased if patient taking Biotin.       Comprehensive Metabolic Panel [[id number]]  (Abnormal) Collected:  09/19/19 0640    Specimen:  Blood Updated:  09/19/19 0819     Glucose 103 mg/dL      BUN 10 mg/dL      Creatinine 1.00 mg/dL      Sodium 141 mmol/L      Potassium 4.5 mmol/L       Chloride 104 mmol/L      CO2 29.0 mmol/L      Calcium 8.8 mg/dL      Total Protein 6.1 g/dL      Albumin 3.40 g/dL      ALT (SGPT) 16 U/L      AST (SGOT) 21 U/L      Alkaline Phosphatase 81 U/L      Total Bilirubin 0.9 mg/dL      eGFR Non African Amer 55 mL/min/1.73      Globulin 2.7 gm/dL      A/G Ratio 1.3 g/dL      BUN/Creatinine Ratio 10.0     Anion Gap 12.5 mmol/L     Narrative:       The MDRD GFR formula is only valid for adults with stable renal function between ages 18 and 70.    Phosphorus [[id number]]  (Normal) Collected:  09/19/19 0640    Specimen:  Blood Updated:  09/19/19 0817     Phosphorus 3.5 mg/dL     Magnesium [[id number]]  (Normal) Collected:  09/19/19 0640    Specimen:  Blood Updated:  09/19/19 0817     Magnesium 1.9 mg/dL     Troponin [[id number]]  (Normal) Collected:  09/19/19 0640    Specimen:  Blood Updated:  09/19/19 0806     Troponin I <0.030 ng/mL     Narrative:       Troponin I Reference Range:    0.00-0.03  Negative.  Repeat testing in 4-6 hours if clinically indicated.    0.04-0.29  Suspicious for myocardial injury. Serial measurements and clinical  correlation may be necessary to confirm or exclude diagnosis of acute  coronary syndrome.  Repeat testing in 4-6 hours if indicated.     >0.29 Consistent with myocardial injury.  Recommend clinical and laboratory correlation.     Results my be falsely decreased if patient taking Biotin.     CBC (No Diff) [[id number]]  (Normal) Collected:  09/19/19 0640    Specimen:  Blood Updated:  09/19/19 0740     WBC 4.50 10*3/mm3      RBC 4.69 10*6/mm3      Hemoglobin 14.6 g/dL      Hematocrit 43.7 %      MCV 93.2 fL      MCH 31.0 pg      MCHC 33.3 g/dL      RDW 13.0 %      RDW-SD 42.4 fl      MPV 9.5 fL      Platelets 168 10*3/mm3     Troponin [[id number]]  (Normal) Collected:  09/18/19 2229    Specimen:  Blood Updated:  09/18/19 2346     Troponin I <0.030 ng/mL     Narrative:       Troponin I Reference Range:    0.00-0.03  Negative.  Repeat testing in 4-6  hours if clinically indicated.    0.04-0.29  Suspicious for myocardial injury. Serial measurements and clinical  correlation may be necessary to confirm or exclude diagnosis of acute  coronary syndrome.  Repeat testing in 4-6 hours if indicated.     >0.29 Consistent with myocardial injury.  Recommend clinical and laboratory correlation.     Results my be falsely decreased if patient taking Biotin.     Comprehensive Metabolic Panel [[id number]]  (Abnormal) Collected:  09/18/19 1659    Specimen:  Blood Updated:  09/18/19 1805     Glucose 97 mg/dL      BUN 10 mg/dL      Creatinine 0.90 mg/dL      Sodium 136 mmol/L      Potassium 3.5 mmol/L      Chloride 104 mmol/L      CO2 23.0 mmol/L      Calcium 8.4 mg/dL      Total Protein 6.5 g/dL      Albumin 3.70 g/dL      ALT (SGPT) 15 U/L      AST (SGOT) 21 U/L      Alkaline Phosphatase 93 U/L      Total Bilirubin 0.7 mg/dL      eGFR Non African Amer 62 mL/min/1.73      Globulin 2.8 gm/dL      A/G Ratio 1.3 g/dL      BUN/Creatinine Ratio 11.1     Anion Gap 12.5 mmol/L     Narrative:       The MDRD GFR formula is only valid for adults with stable renal function between ages 18 and 70.    Troponin [[id number]]  (Normal) Collected:  09/18/19 1659    Specimen:  Blood Updated:  09/18/19 1741     Troponin I <0.030 ng/mL     Narrative:       Troponin I Reference Range:    0.00-0.03  Negative.  Repeat testing in 4-6 hours if clinically indicated.    0.04-0.29  Suspicious for myocardial injury. Serial measurements and clinical  correlation may be necessary to confirm or exclude diagnosis of acute  coronary syndrome.  Repeat testing in 4-6 hours if indicated.     >0.29 Consistent with myocardial injury.  Recommend clinical and laboratory correlation.     Results my be falsely decreased if patient taking Biotin.     Protime-INR [[id number]]  (Normal) Collected:  09/18/19 1659    Specimen:  Blood Updated:  09/18/19 1741     Protime 10.6 Seconds      INR 1.03    aPTT [[id number]]  (Normal)  Collected:  09/18/19 1659    Specimen:  Blood Updated:  09/18/19 1741     PTT 25.6 seconds     CBC & Differential [[id number]] Collected:  09/18/19 1659    Specimen:  Blood Updated:  09/18/19 1730    Narrative:       The following orders were created for panel order CBC & Differential.  Procedure                               Abnormality         Status                     ---------                               -----------         ------                     CBC Auto Differential[[id number]]        Abnormal            Final result                 Please view results for these tests on the individual orders.    CBC Auto Differential [[id number]]  (Abnormal) Collected:  09/18/19 1659    Specimen:  Blood Updated:  09/18/19 1730     WBC 6.30 10*3/mm3      RBC 4.68 10*6/mm3      Hemoglobin 14.7 g/dL      Hematocrit 42.9 %      MCV 91.6 fL      MCH 31.4 pg      MCHC 34.3 g/dL      RDW 12.6 %      RDW-SD 40.3 fl      MPV 9.5 fL      Platelets 191 10*3/mm3      Neutrophil % 51.7 %      Lymphocyte % 32.6 %      Monocyte % 12.6 %      Eosinophil % 2.5 %      Basophil % 0.6 %      Neutrophils, Absolute 3.20 10*3/mm3      Lymphocytes, Absolute 2.10 10*3/mm3      Monocytes, Absolute 0.80 10*3/mm3      Eosinophils, Absolute 0.20 10*3/mm3      Basophils, Absolute 0.00 10*3/mm3      nRBC 0.0 /100 WBC           Imaging Results (last 24 hours)     Procedure Component Value Units Date/Time    MRI Brain Without Contrast [[id number]] Collected:  09/18/19 1743     Updated:  09/18/19 1755    Narrative:          DATE OF EXAM:   9/18/2019 5:10 PM     PROCEDURE:   MRI BRAIN WO CONTRAST-     INDICATIONS:   hx stroke, off balance, dizziness, history of melanoma     COMPARISON:  No Comparisons Available     TECHNIQUE:   Routine magnetic resonance imaging of the brain was performed without  administration of contrast.     FINDINGS:   There is no abnormal diffusion restriction to suggest acute ischemia.  There is prominence of the ventricles and  cortical sulci consistent with  generalized cerebral atrophy. Moderate areas of periventricular and  subcortical white matter T2/FLAIR hyperintense signal similar to prior  study most consistent with changes of chronic small vessel disease.  There is evidence of prior lacunar infarction involving the left  cerebellum which appears stable. There is no abnormal extra-axial fluid  collection. No findings of intracranial hemorrhage. The major T2  weighted intracranial vascular flow voids are patent. Paranasal sinuses  well aerated. Negative for mastoid effusion. Pituitary gland normal in  size. Corpus callosum and midline structures within normal limits.  Negative for Chiari malformation.       Impression:       1. Negative for acute ischemia, mass, or hemorrhage.  2. Stable chronic findings above.     Electronically Signed By-Cedric Lehman On:9/18/2019 5:52 PM  This report was finalized on [id number] by  Cedric Lehman, .                                     Xrays, labs reviewed personally by physician.    ECG/EMG Results (most recent)     Procedure Component Value Units Date/Time    ECG 12 Lead [[id number]] Collected:  09/19/19 0554     Updated:  09/19/19 0556    Narrative:       HEART RATE= 51  bpm  RR Interval= 1108  ms  WI Interval= 189  ms  P Horizontal Axis= -1  deg  P Front Axis= 73  deg  QRSD Interval= 95  ms  QT Interval= 465  ms  QRS Axis= 42  deg  T Wave Axis= 50  deg  - ABNORMAL ECG -  Sinus bradycardia  Multiple ventricular premature complexes  Electronically Signed By:   Date and Time of Study: 2019-09-19 05:54:27    ECG 12 Lead [[id number]] Collected:  09/18/19 1637     Updated:  09/19/19 0648    Narrative:       HEART RATE= 61  bpm  RR Interval= 984  ms  WI Interval= 187  ms  P Horizontal Axis= -8  deg  P Front Axis= 74  deg  QRSD Interval= 92  ms  QT Interval= 445  ms  QRS Axis= 12  deg  T Wave Axis= 42  deg  - ABNORMAL ECG -  Sinus rhythm  Ventricular trigeminy  Probable left atrial  enlargement  Electronically Signed By: Sid Taylor (Delfin) 19-Sep-2019 06:48:36  Date and Time of Study: 2019-09-18 16:37:04            Medication Review:   I have reviewed the patient's current medication list  Scheduled Meds:  amLODIPine 5 mg Oral Q24H   aspirin 81 mg Oral Daily   atorvastatin 20 mg Oral Nightly   clopidogrel 75 mg Oral Daily   hydrochlorothiazide 12.5 mg Oral Daily   levETIRAcetam 500 mg Oral Q12H   levothyroxine 25 mcg Oral Q AM   metoprolol succinate XL 25 mg Oral Daily   pantoprazole 40 mg Oral Q AM   pregabalin 100 mg Oral TID   sodium chloride 10 mL Intravenous Q12H   sulfaSALAzine 1,000 mg Oral BID     Continuous Infusions:   PRN Meds:.magnesium sulfate **OR** magnesium sulfate **OR** magnesium sulfate  •  potassium chloride **OR** potassium chloride **OR** potassium chloride  •  sodium chloride        Assessment/Plan     Near Syncope - likely cardiac related EKG shows sinus bradycardia with bigeminy intermittently. Has seen Pa for similar complaints recently and BB decreased without relief.  - serial troponin negative  - CT head negative  -Dr. Winn following and considering pacemaker placement     CAD s/p remote CABG and  cardiac cath June 2019 SVG to RCA totally occluded per report, medical treatment advised   - continuous monitoring and    - continue aspirin, statin and BB     Mild hypokalemia 3.5  - K replacement   -Magnesium normal    Primary HTN, chronic and controlled  - stable on BB and HCTZ, monitor      HX CVA  With residual right sided weakness   - CT head negative   - no focal deficits, slurred speech, headache or confusion.     Hypothyroidism   -TSH normal    -Continue Synthroid     GERD- on PPI     Rheumatoid arthritis stable   - continue home Sulfasalazine     DJD- on Fosamax      Seizure disorder,stable (none in 3 years)  -Continue Keppra      Peripheral neuropathy - on Lyrica      DVT [prophylaxis - scd     Plan for disposition: Pending clinical course    Pam العراقي  MD Juan R  09/19/19  9:54 AM

## 2019-09-20 PROBLEM — R55 NEAR SYNCOPE: Status: RESOLVED | Noted: 2019-01-01 | Resolved: 2019-01-01

## 2019-09-20 NOTE — PLAN OF CARE
Problem: Patient Care Overview  Goal: Plan of Care Review  Outcome: Ongoing (interventions implemented as appropriate)   09/20/19 0329   Coping/Psychosocial   Plan of Care Reviewed With patient   Plan of Care Review   Progress no change      09/20/19 0329   Coping/Psychosocial   Plan of Care Reviewed With patient   Plan of Care Review   Progress no change     Goal: Discharge Needs Assessment  Outcome: Ongoing (interventions implemented as appropriate)   09/20/19 0329   Discharge Needs Assessment   Readmission Within the Last 30 Days no previous admission in last 30 days   Concerns to be Addressed no discharge needs identified;denies needs/concerns at this time   Patient/Family Anticipates Transition to home with family   Patient/Family Anticipated Services at Transition none   Transportation Concerns car, none   Transportation Anticipated family or friend will provide   Anticipated Changes Related to Illness none   Equipment Needed After Discharge none   Disability   Equipment Currently Used at Home cane, straight;wheelchair, motorized;nebulizer;oxygen;bipap/cpap       Problem: Fall Risk (Adult)  Goal: Identify Related Risk Factors and Signs and Symptoms  Outcome: Ongoing (interventions implemented as appropriate)   09/20/19 0329   Fall Risk (Adult)   Related Risk Factors (Fall Risk) impaired vision;slippery/uneven surfaces   Signs and Symptoms (Fall Risk) presence of risk factors     Goal: Absence of Fall  Outcome: Ongoing (interventions implemented as appropriate)   09/20/19 0329   Fall Risk (Adult)   Absence of Fall making progress toward outcome       Problem: Syncope (Adult)  Goal: Identify Related Risk Factors and Signs and Symptoms  Outcome: Ongoing (interventions implemented as appropriate)   09/20/19 0329   Syncope (Adult)   Related Risk Factors (Syncope) dysrhythmia   Signs and Symptoms (Syncope) visual disturbance     Goal: Physical Safety/Health Maintenance  Outcome: Ongoing (interventions implemented as  appropriate)   09/20/19 0329   Syncope (Adult)   Physical Safety/Health Maintenance making progress toward outcome     Goal: Optimal Emotional/Functional [place]  Outcome: Ongoing (interventions implemented as appropriate)   09/20/19 0329   Syncope (Adult)   Optimal Emotional/Functional [place] making progress toward outcome

## 2019-09-20 NOTE — PLAN OF CARE
Problem: Fall Risk (Adult)  Goal: Absence of Fall  Outcome: Ongoing (interventions implemented as appropriate)   09/20/19 1244   Fall Risk (Adult)   Absence of Fall making progress toward outcome

## 2019-09-20 NOTE — PLAN OF CARE
Problem: Fall Risk (Adult)  Goal: Identify Related Risk Factors and Signs and Symptoms  Outcome: Ongoing (interventions implemented as appropriate)   09/20/19 0064   Fall Risk (Adult)   Related Risk Factors (Fall Risk) impaired vision;age-related changes   Signs and Symptoms (Fall Risk) presence of risk factors

## 2019-09-20 NOTE — PLAN OF CARE
Problem: Syncope (Adult)  Goal: Identify Related Risk Factors and Signs and Symptoms  Outcome: Ongoing (interventions implemented as appropriate)   09/20/19 1244   Syncope (Adult)   Related Risk Factors (Syncope) dysrhythmia   Signs and Symptoms (Syncope) visual disturbance;shortness of breath

## 2019-09-20 NOTE — PROGRESS NOTES
Cardiology Progress Note    Patient Identification:  Name: Delphine Rob  Age: 73 y.o.  Sex: female  :  1945  MRN: [id number]                 Follow Up / Chief Complaint: Follow-up for weakness, dizziness, near syncope, bradycardia tachycardia syndrome  Chief Complaint   Patient presents with   • Weakness - Generalized       Interval History: Patient's heart rate is improved on decreased dose of beta-blockers       Subjective: Patient is complaining of skipped beats        Assessment :    -Dizziness, near syncope  Bradycardia tachycardia syndrome with sinus bradycardia and intermittent atrial fibrillation and frequent PVCs  -CAD, CABG 1/20/10, PCI  Cardiac cath  and coronary angiography 2019 revealed  Left ventricle size and contractility is normal with proximal inferior wall hypokinesis.  Ejection fraction is 55%.  Left main artery is normal.  Left anterior descending artery is noted in the midsegment with filling of the LAD through LIMA.  Circumflex coronary artery is normal  Right coronary artery is a very small caliber vessel with 99% disease in the midsegment with filling of the distal vessel through collaterals.     RAUSCH to LAD is patent  SVG to marginal branch is patent  SVG to RCA is totally occluded.        Echocardiogram 2019 revealed mild mitral regurgitation and normal left ventricular function.  [place]  Carri scan Cardiolite test is negative for myocardial ischemia 2017     -Intermittent atrial fibrillation.  Patient is maintaining sinus rhythm.       -History of  stroke from which patient has recovered.      -Hypertension dyslipidemia and hypothyroidism      -Status post thyroidectomy for cyst cholecystectomy and hysterectomy.      -History of hepatitis-C      -Allergy to penicillin      -Former smoker quit smoking 2013.      -Status post  Malignant melanoma removal from right elbow area.  Patient did not need chemotherapy     -history of peripheral vascular disease.   CTA 06/30/2017 revealed right superficial femoral artery occlusion  ---------------.  Plan  ----------------------  Serial troponins are negative for MI  Potassium is improved to 4  Patient decreased dose of beta-blockers is improved her heart rate  Will hook her up to a 48-hour Holter monitor and follow-up as outpatient with primary cardiologist Dr. High on lower dose of beta-blockers  Patient might ultimately require permanent pacemaker  Patient likely has tachy bradycardia syndrome.  Since patient asymptomatic we will reduce the dose of metoprolol to 25 mg p.o. twice daily and consider addition of amlodipine to maintain her normal blood pressure.  If however patient has continued problems with tachyarrhythmias especially atrial fibrillation patient would benefit from permanent dual-chamber pacemaker implantation.  Consideration would be given for loop recorder placement.  Patient recently had an echocardiogram in June 2019 and had cardiac catheterization in June 2019  Medications were reviewed and updated.  Close cardiac monitoring.  TSH is normal at 3.75.  Further plan will depend on patient's progress.  ]]]]]]]]]]]]]]]]       Past Medical History:  Past Medical History:   Diagnosis Date   • Arthritis    • Atrial fibrillation (CMS/HCC)    • Bronchitis    • Chickenpox    • COPD (chronic obstructive pulmonary disease) (CMS/HCC)    • Cramps of lower extremity     bilateral with weakness   • Heart disease    • Hepatitis C 2013    treatment x 11 months   • High cholesterol    • History of degenerative disc disease    • HLD (hyperlipidemia)    • HTN (hypertension)    • Hypothyroidism    • Measles    • Seizure disorder (CMS/HCC)    • Skin melanoma (CMS/HCC) 2013    s/p lymph node removal on the right   • Sleep apnea    • Stroke (CMS/HCC)    • Whooping cough      Past Surgical History:  Past Surgical History:   Procedure Laterality Date   • APPENDECTOMY     • AXILLARY NODE DISSECTION Right 05/2014   • CARDIAC  CATHETERIZATION N/A 6/19/2019    Procedure: Left Heart Cath;  Surgeon: Real Winn MD;  Location: [facility] CATH INVASIVE LOCATION;  Service: Cardiovascular   • CARDIAC CATHETERIZATION N/A 6/19/2019    Procedure: Saphenous Vein Graft;  Surgeon: Real Winn MD;  Location: [facility] CATH INVASIVE LOCATION;  Service: Cardiovascular   • CARDIAC CATHETERIZATION N/A 6/19/2019    Procedure: Left ventriculography;  Surgeon: Real Winn MD;  Location: [facility] CATH INVASIVE LOCATION;  Service: Cardiovascular   • CARDIAC CATHETERIZATION N/A 6/19/2019    Procedure: Coronary angiography;  Surgeon: Real Winn MD;  Location: [facility] CATH INVASIVE LOCATION;  Service: Cardiovascular   • CATARACT EXTRACTION  01/2017    x 2   • CHOLECYSTECTOMY  2004   • COLONOSCOPY  04/05/2017   • CORONARY ANGIOPLASTY WITH STENT PLACEMENT      stent placement after CABG   • CORONARY ARTERY BYPASS GRAFT  01/20/2010   • CORONARY ARTERY BYPASS GRAFT     • FEMORAL POPLITEAL BYPASS Right 07/20/2017    Dr. Palmer   • INCISIONAL HERNIA REPAIR  09/28/2016    lap. Dr. West   • MOLE REMOVAL Right     arm   • OTHER SURGICAL HISTORY      laser surgery X 2   • PARTIAL HYSTERECTOMY     • THYROIDECTOMY, PARTIAL      goiter removed 1/2 thyroid        Social History:   Social History     Tobacco Use   • Smoking status: Former Smoker     Last attempt to quit: 6/17/2009     Years since quitting: 10.2   • Smokeless tobacco: Never Used   Substance Use Topics   • Alcohol use: Yes     Frequency: Monthly or less     Comment: beer      Family History:  Family History   Problem Relation Age of Onset   • Hypertension Mother    • Cancer Mother    • Gout Mother    • Asthma Mother    • Cancer Father    • Heart disease Brother    • Diabetes Other    • Cancer Other    • Tuberculosis Other           Allergies:  Allergies   Allergen Reactions   • Penicillamine Unknown (See Comments)   • Penicillins Hives     Scheduled Meds:    amLODIPine 5 mg Q24H   aspirin 81 mg  "Daily   atorvastatin 20 mg Nightly   clopidogrel 75 mg Daily   hydrochlorothiazide 12.5 mg Daily   levETIRAcetam 500 mg Q12H   levothyroxine 25 mcg Q AM   metoprolol succinate XL 25 mg Daily   pantoprazole 40 mg Q AM   pregabalin 100 mg TID   sodium chloride 10 mL Q12H   sulfaSALAzine 1,000 mg BID           INTAKE AND OUTPUT:    Intake/Output Summary (Last 24 hours) at 9/20/2019 1529  Last data filed at 9/20/2019 1100  Gross per 24 hour   Intake 900 ml   Output --   Net 900 ml       Review of Systems:   Constitutional: No weakness,fatigue, fever, rigors, chills   Eyes: No vision changes, eye pain   ENT/oropharynx: No difficulty swallowing, sore throat, epistaxis, changes in hearing   Cardiovascular: No chest pain, chest tightness, palpitations, paroxysmal nocturnal dyspnea, orthopnea, diaphoresis, dizziness / syncopal episode   Respiratory: No shortness of breath, dyspnea on exertion, cough, wheezing hemoptysis   Gastrointestinal: No abdominal pain, nausea, vomiting, diarrhea, bloody stools   Genitourinary: No hematuria, dysuria   Neurological: No headache, tremors, numbness,  one-sided weakness    Musculoskeletal: No cramps, myalgias,  joint pain, joint swelling   Integument: No rash, edema         Constitutional:  Temp:  [97.5 °F (36.4 °C)-97.9 °F (36.6 °C)] 97.9 °F (36.6 °C)  Heart Rate:  [46-78] 55  Resp:  [14-18] 17  BP: (130-149)/(65-82) 138/65    Physical Exam   /65 (BP Location: Left arm, Patient Position: Lying)   Pulse 55   Temp 97.9 °F (36.6 °C) (Oral)   Resp 17   Ht 157.5 cm (62\")   Wt 76.3 kg (168 lb 3.4 oz)   SpO2 97%   Breastfeeding? No   BMI 30.77 kg/m²   General:  Appears in no acute distress  Eyes: Sclera is anicteric,  conjunctiva is clear   HEENT:  No JVD. Thyroid not visibly enlarged. No mucosal pallor or cyanosis  Respiratory: Respirations regular and unlabored at rest.  Bilaterally good breath sounds, with good air entry in all fields. No crackles, rubs or wheezes " auscultated  Cardiovascular: S1,S2 Regular rate and rhythm. No murmur, rub or gallop auscultated. No carotid bruits. DP/PT pulses    . No pretibial pitting edema  Gastrointestinal: Abdomen soft, flat, non tender. Bowel sounds present. No hepatosplenomegaly. No ascites  Musculoskeletal:  No abnormal movements  Extremities: No digital clubbing or cyanosis  Skin: Color pink. Skin warm and dry to touch. No rashes  No xanthoma  Neuro: Alert and awake, no lateralizing deficits appreciated        Cardiographics  Telemetry:     ECG:   ECG 12 Lead   Preliminary Result   HEART RATE= 66  bpm   RR Interval= 908  ms   AK Interval= 182  ms   P Horizontal Axis= 13  deg   P Front Axis= 69  deg   QRSD Interval= 86  ms   QT Interval= 439  ms   QRS Axis= 9  deg   T Wave Axis= 46  deg   - ABNORMAL ECG -   Sinus rhythm   Ventricular trigeminy   Electronically Signed By:    Date and Time of Study: 2019-09-19 10:51:57      ECG 12 Lead   Preliminary Result   HEART RATE= 66  bpm   RR Interval= 908  ms   AK Interval= 182  ms   P Horizontal Axis= 13  deg   P Front Axis= 69  deg   QRSD Interval= 86  ms   QT Interval= 439  ms   QRS Axis= 9  deg   T Wave Axis= 46  deg   - ABNORMAL ECG -   Sinus rhythm   Ventricular trigeminy   Electronically Signed By:    Date and Time of Study: 2019-09-19 10:51:57      ECG 12 Lead   Final Result   HEART RATE= 61  bpm   RR Interval= 984  ms   AK Interval= 187  ms   P Horizontal Axis= -8  deg   P Front Axis= 74  deg   QRSD Interval= 92  ms   QT Interval= 445  ms   QRS Axis= 12  deg   T Wave Axis= 42  deg   - ABNORMAL ECG -   Sinus rhythm   Ventricular trigeminy   Probable left atrial enlargement   Electronically Signed By: Sid Taylor (Delfin) 19-Sep-2019 06:48:36   Date and Time of Study: 2019-09-18 16:37:04      ECG 12 Lead   Preliminary Result   HEART RATE= 51  bpm   RR Interval= 1108  ms   AK Interval= 189  ms   P Horizontal Axis= -1  deg   P Front Axis= 73  deg   QRSD Interval= 95  ms   QT Interval= 465  ms  "  QRS Axis= 42  deg   T Wave Axis= 50  deg   - ABNORMAL ECG -   Sinus bradycardia   Multiple ventricular premature complexes   Electronically Signed By:    Date and Time of Study: 2019-09-19 05:54:27        I have personally reviewed EKG    Echocardiogram: Results for orders placed during the hospital encounter of 06/17/19   Adult Transthoracic Echo Complete W/ Cont if Necessary Per Protocol    Narrative Technically satisfactory study.  Mitral valve is thickened with adequate opening motion.  Aortic valve is normal.  Tricuspid valve is normal.  Left atrium is normal in size.  Left ventricle is normal in size and contractility with ejection fraction   of 60%.  Atrial septum is intact.  No pericardial effusion or intracardiac thrombus is seen.  The right atrium and right ventricle are normal.  Aorta is normal.    Impression  Thickened mitral valve with adequate opening motion.  Normal left ventricle size and contractility with ejection fraction of   60%.       Lab Review   I have reviewed the labs  Results from last 7 days   Lab Units 09/20/19  1151 09/20/19  0525 09/19/19  1837   TROPONIN I ng/mL <0.030 <0.030 <0.030     Results from last 7 days   Lab Units 09/20/19  0525   MAGNESIUM mg/dL 1.9     Results from last 7 days   Lab Units 09/20/19  0525   SODIUM mmol/L 141   POTASSIUM mmol/L 4.0   BUN mg/dL 9   CREATININE mg/dL 0.80   CALCIUM mg/dL 8.7*     @[id number]@  Results from last 7 days   Lab Units 09/19/19  0640 09/18/19  1659   WBC 10*3/mm3 4.50 6.30   HEMOGLOBIN g/dL 14.6 14.7   HEMATOCRIT % 43.7 42.9   PLATELETS 10*3/mm3 168 191     Results from last 7 days   Lab Units 09/18/19  1659   INR  1.03   APTT seconds 25.6       RADIOLOGY:  Imaging Results (last 24 hours)     ** No results found for the last 24 hours. **              )9/20/2019  Frank Rueda MD      EMR Dragon/Transcription:   \"Dictated utilizing Dragon dictation\".   "

## 2019-09-20 NOTE — PLAN OF CARE
Problem: Patient Care Overview  Goal: Plan of Care Review  Outcome: Ongoing (interventions implemented as appropriate)   09/20/19 1895   Coping/Psychosocial   Plan of Care Reviewed With patient   Plan of Care Review   Progress no change

## 2019-09-20 NOTE — PLAN OF CARE
Problem: Syncope (Adult)  Goal: Physical Safety/Health Maintenance  Outcome: Ongoing (interventions implemented as appropriate)   09/20/19 1244   Syncope (Adult)   Physical Safety/Health Maintenance making progress toward outcome

## 2019-09-20 NOTE — DISCHARGE SUMMARY
"Date of Admission: 9/18/2019    Date of Discharge:  9/20/2019    Length of stay:  LOS: 2 days     Discharge Diagnosis:   Near Syncope - likely cardiac related EKG shows sinus bradycardia with bigeminy intermittently. -saw Pa for similar complaints recently and BB decreased without relief.  - serial troponin negative  - CT head negative  -Dr. Winn following and considering pacemaker placement  -Patient will be discharged on a Holter monitor on a lower dosage of metoprolol and follow-up closely with cardiology     CAD s/p remote CABG and  cardiac cath June 2019 SVG to RCA totally occluded per report, medical treatment advised   - continuous monitoring and    - continue aspirin, statin and BB     Mild hypokalemia 3.5  - K replacement   -Magnesium normal     Primary HTN, chronic and controlled  - stable on BB and HCTZ, monitor      HX CVA  With residual right sided weakness   - CT head negative   - no focal deficits, slurred speech, headache or confusion.     Hypothyroidism   -TSH normal   -Continue Synthroid      GERD- on PPI     Rheumatoid arthritis stable   - continue home Sulfasalazine     DJD- on Fosamax      Seizure disorder,stable (none in 3 years)  -Continue Keppra      Peripheral neuropathy - on Lyrica     Presenting Problem  Active Hospital Problems    Diagnosis  POA   • Bradycardia [R00.1]  Yes      Resolved Hospital Problems    Diagnosis Date Resolved POA   • **Near syncope [R55] 09/20/2019 Yes      History of present illness  73 year old female presented with complaints of general weakness \"off balance,\" dizziness and near syncope. She denies dyspnea, chest pain, nausea or diaphoresis. She reports she was at her rheumatologist today and they were concerned about her HR. She presented with sinus bradycardia in 40's and some Bigeminy. She reports she had this problem before and Dr Winn of cardiology decreased her BB but has not helped. Troponin negative today . CT head per radiology no acute findings. " She denies confusion, visual disturbance, headache slurred speech and no focal deficits. She has chronic right sided mild weakness secondary to CVA in past. K is 3.5 and replacement was ordered. Review of records shows a cardiac cath in June which showed a totally occluded SVG to RCA, - medical management advised. EF 55%. Cardiology was considering a loop recorder id syncope persisted. PMH of stable seizure disorder, CAD s/p CABG, CVA, hypothyroidism, HLD, HTN, GERD and rheumatoid arthritis. She was given Lopressor in ED and was admitted for cardiac monitoring of arrhythmia and cardiology consulted.  Hospital Course  9/19/2019: Patient reports continuing to feel poorly with irregular heart rhythm and generalized fatigue and lightheadedness when she gets up.  She has bigeminy and trigeminy on telemetry.  9/20/2019 Dr. Rueda saw the patient in consultation and recommended that the patient be discharged home on a reduced dosage of metoprolol with a Holter monitor in place.  The patient admits to former cigarette abuse having quit 9 years ago.  She has been using her rescue inhaler more recently.  She took Advair in the past but reported that it caused her to gain weight and she discontinued it.  She was counseled and advised to initiate Symbicort for her COPD and follow-up closely with her family doctor.  She will have a 6-minute walk prior to discharge to determine home O2 needs and she will have a Holter monitor placed prior to discharge.  Patient is now felt to be stable for discharge home.        Past Medical History:     Past Medical History:   Diagnosis Date   • Arthritis    • Atrial fibrillation (CMS/HCC)    • Bronchitis    • Chickenpox    • COPD (chronic obstructive pulmonary disease) (CMS/HCC)    • Cramps of lower extremity     bilateral with weakness   • Heart disease    • Hepatitis C 2013    treatment x 11 months   • High cholesterol    • History of degenerative disc disease    • HLD (hyperlipidemia)    •  HTN (hypertension)    • Hypothyroidism    • Measles    • Seizure disorder (CMS/HCC)    • Skin melanoma (CMS/HCC) 2013    s/p lymph node removal on the right   • Sleep apnea    • Stroke (CMS/HCC)    • Whooping cough        Past Surgical History:     Past Surgical History:   Procedure Laterality Date   • APPENDECTOMY     • AXILLARY NODE DISSECTION Right 05/2014   • CARDIAC CATHETERIZATION N/A 6/19/2019    Procedure: Left Heart Cath;  Surgeon: Real Winn MD;  Location:  [facility];  Service: Cardiovascular   • CARDIAC CATHETERIZATION N/A 6/19/2019    Procedure: Saphenous Vein Graft;  Surgeon: Real Winn MD;  Location:  [facility];  Service: Cardiovascular   • CARDIAC CATHETERIZATION N/A 6/19/2019    Procedure: Left ventriculography;  Surgeon: Real Winn MD;  Location:  [facility];  Service: Cardiovascular   • CARDIAC CATHETERIZATION N/A 6/19/2019    Procedure: Coronary angiography;  Surgeon: Real Winn MD;  Location: [facility] CATH INVASIVE LOC[facility];  Service: Cardiovascular   • CATARACT EXTRACTION  01/2017    x 2   • CHOLECYSTECTOMY  2004   • COLONOSCOPY  04/05/2017   • CORONARY ANGIOPLASTY WITH STENT PLACEMENT      stent placement after CABG   • CORONARY ARTERY BYPASS GRAFT  01/20/2010   • CORONARY ARTERY BYPASS GRAFT     • FEMORAL POPLITEAL BYPASS Right 07/20/2017    Dr. Palmer   • INCISIONAL HERNIA REPAIR  09/28/2016    lap. Dr. West   • MOLE REMOVAL Right     arm   • OTHER SURGICAL HISTORY      laser surgery X 2   • PARTIAL HYSTERECTOMY     • THYROIDECTOMY, PARTIAL      goiter removed 1/2 thyroid       Social History:   Social History     Socioeconomic History   • Marital status:      Spouse name: Not on file   • Number of children: 2   • Years of education: Not on file   • Highest education level: Not on file   Tobacco Use   • Smoking status: Former Smoker     Last attempt to quit: 6/17/2009     Years since quitting: 10.2    • Smokeless tobacco: Never Used   Substance and Sexual Activity   • Alcohol use: Yes     Frequency: Monthly or less     Comment: beer   • Drug use: No   • Sexual activity: No       Procedures Performed         Consults:   Consults     Date and Time Order Name Status Description    9/18/2019 2118 Inpatient Cardiology Consult      9/18/2019 1903 Hospitalist (on-call MD unless specified) Completed           Pertinent Test Results:     Lab Results (last 72 hours)     Procedure Component Value Units Date/Time    Troponin [[id number]]  (Normal) Collected:  09/20/19 1151    Specimen:  Blood Updated:  09/20/19 1401     Troponin I <0.030 ng/mL     Narrative:       Troponin I Reference Range:    0.00-0.03  Negative.  Repeat testing in 4-6 hours if clinically indicated.    0.04-0.29  Suspicious for myocardial injury. Serial measurements and clinical  correlation may be necessary to confirm or exclude diagnosis of acute  coronary syndrome.  Repeat testing in 4-6 hours if indicated.     >0.29 Consistent with myocardial injury.  Recommend clinical and laboratory correlation.     Results my be falsely decreased if patient taking Biotin.     Basic Metabolic Panel [[id number]]  (Abnormal) Collected:  09/20/19 0525    Specimen:  Blood Updated:  09/20/19 0636     Glucose 106 mg/dL      BUN 9 mg/dL      Creatinine 0.80 mg/dL      Sodium 141 mmol/L      Potassium 4.0 mmol/L      Chloride 107 mmol/L      CO2 25.0 mmol/L      Calcium 8.7 mg/dL      eGFR Non African Amer 71 mL/min/1.73      BUN/Creatinine Ratio 11.3     Anion Gap 13.0 mmol/L     Narrative:       The MDRD GFR formula is only valid for adults with stable renal function between ages 18 and 70.    Magnesium [[id number]]  (Normal) Collected:  09/20/19 0525    Specimen:  Blood Updated:  09/20/19 0636     Magnesium 1.9 mg/dL     TSH [[id number]]  (Normal) Collected:  09/20/19 0525    Specimen:  Blood Updated:  09/20/19 0636     TSH 3.740 uIU/mL      Comment: Results may be falsely  decreased if patient taking Biotin.       Troponin [[id number]]  (Normal) Collected:  09/20/19 0525    Specimen:  Blood Updated:  09/20/19 0618     Troponin I <0.030 ng/mL     Narrative:       Troponin I Reference Range:    0.00-0.03  Negative.  Repeat testing in 4-6 hours if clinically indicated.    0.04-0.29  Suspicious for myocardial injury. Serial measurements and clinical  correlation may be necessary to confirm or exclude diagnosis of acute  coronary syndrome.  Repeat testing in 4-6 hours if indicated.     >0.29 Consistent with myocardial injury.  Recommend clinical and laboratory correlation.     Results my be falsely decreased if patient taking Biotin.     Troponin [[id number]]  (Normal) Collected:  09/19/19 1837    Specimen:  Blood Updated:  09/19/19 1928     Troponin I <0.030 ng/mL     Narrative:       Troponin I Reference Range:    0.00-0.03  Negative.  Repeat testing in 4-6 hours if clinically indicated.    0.04-0.29  Suspicious for myocardial injury. Serial measurements and clinical  correlation may be necessary to confirm or exclude diagnosis of acute  coronary syndrome.  Repeat testing in 4-6 hours if indicated.     >0.29 Consistent with myocardial injury.  Recommend clinical and laboratory correlation.     Results my be falsely decreased if patient taking Biotin.     Troponin [[id number]]  (Normal) Collected:  09/19/19 1308    Specimen:  Blood Updated:  09/19/19 1353     Troponin I <0.030 ng/mL     Narrative:       Troponin I Reference Range:    0.00-0.03  Negative.  Repeat testing in 4-6 hours if clinically indicated.    0.04-0.29  Suspicious for myocardial injury. Serial measurements and clinical  correlation may be necessary to confirm or exclude diagnosis of acute  coronary syndrome.  Repeat testing in 4-6 hours if indicated.     >0.29 Consistent with myocardial injury.  Recommend clinical and laboratory correlation.     Results my be falsely decreased if patient taking Biotin.     Hemoglobin A1c  [[id number]]  (Normal) Collected:  09/19/19 0640    Specimen:  Blood Updated:  09/19/19 0847     Hemoglobin A1C 5.4 %     Narrative:       Hemoglobin A1C Reference Range:    <5.7 %        Normal  5.7-6.4 %     Increased risk for diabetes  > 6.4 %        Diabetes       These guidelines have been recommended by the American Diabetic Association for Hgb A1c.      The following 2010 guidelines have been recommended by the American Diabetes Association for Hemoglobin A1c.    HBA1c 5.7-6.4% Increased risk for future diabetes (pre-diabetes)  HBA1c     >6.4% Diabetes    TSH [[id number]]  (Abnormal) Collected:  09/19/19 0640    Specimen:  Blood Updated:  09/19/19 0822     TSH 5.840 uIU/mL      Comment: Results may be falsely decreased if patient taking Biotin.       T4, Free [[id number]]  (Normal) Collected:  09/19/19 0640    Specimen:  Blood Updated:  09/19/19 0822     Free T4 0.84 ng/dL      Comment: Results may be falsely increased if patient taking Biotin.       Comprehensive Metabolic Panel [[id number]]  (Abnormal) Collected:  09/19/19 0640    Specimen:  Blood Updated:  09/19/19 0819     Glucose 103 mg/dL      BUN 10 mg/dL      Creatinine 1.00 mg/dL      Sodium 141 mmol/L      Potassium 4.5 mmol/L      Chloride 104 mmol/L      CO2 29.0 mmol/L      Calcium 8.8 mg/dL      Total Protein 6.1 g/dL      Albumin 3.40 g/dL      ALT (SGPT) 16 U/L      AST (SGOT) 21 U/L      Alkaline Phosphatase 81 U/L      Total Bilirubin 0.9 mg/dL      eGFR Non African Amer 55 mL/min/1.73      Globulin 2.7 gm/dL      A/G Ratio 1.3 g/dL      BUN/Creatinine Ratio 10.0     Anion Gap 12.5 mmol/L     Narrative:       The MDRD GFR formula is only valid for adults with stable renal function between ages 18 and 70.    Phosphorus [[id number]]  (Normal) Collected:  09/19/19 0640    Specimen:  Blood Updated:  09/19/19 0817     Phosphorus 3.5 mg/dL     Magnesium [[id number]]  (Normal) Collected:  09/19/19 0640    Specimen:  Blood Updated:  09/19/19 0817      Magnesium 1.9 mg/dL     Troponin [[id number]]  (Normal) Collected:  09/19/19 0640    Specimen:  Blood Updated:  09/19/19 0806     Troponin I <0.030 ng/mL     Narrative:       Troponin I Reference Range:    0.00-0.03  Negative.  Repeat testing in 4-6 hours if clinically indicated.    0.04-0.29  Suspicious for myocardial injury. Serial measurements and clinical  correlation may be necessary to confirm or exclude diagnosis of acute  coronary syndrome.  Repeat testing in 4-6 hours if indicated.     >0.29 Consistent with myocardial injury.  Recommend clinical and laboratory correlation.     Results my be falsely decreased if patient taking Biotin.     CBC (No Diff) [[id number]]  (Normal) Collected:  09/19/19 0640    Specimen:  Blood Updated:  09/19/19 0740     WBC 4.50 10*3/mm3      RBC 4.69 10*6/mm3      Hemoglobin 14.6 g/dL      Hematocrit 43.7 %      MCV 93.2 fL      MCH 31.0 pg      MCHC 33.3 g/dL      RDW 13.0 %      RDW-SD 42.4 fl      MPV 9.5 fL      Platelets 168 10*3/mm3     Troponin [[id number]]  (Normal) Collected:  09/18/19 2229    Specimen:  Blood Updated:  09/18/19 2346     Troponin I <0.030 ng/mL     Narrative:       Troponin I Reference Range:    0.00-0.03  Negative.  Repeat testing in 4-6 hours if clinically indicated.    0.04-0.29  Suspicious for myocardial injury. Serial measurements and clinical  correlation may be necessary to confirm or exclude diagnosis of acute  coronary syndrome.  Repeat testing in 4-6 hours if indicated.     >0.29 Consistent with myocardial injury.  Recommend clinical and laboratory correlation.     Results my be falsely decreased if patient taking Biotin.     Comprehensive Metabolic Panel [[id number]]  (Abnormal) Collected:  09/18/19 1659    Specimen:  Blood Updated:  09/18/19 1805     Glucose 97 mg/dL      BUN 10 mg/dL      Creatinine 0.90 mg/dL      Sodium 136 mmol/L      Potassium 3.5 mmol/L      Chloride 104 mmol/L      CO2 23.0 mmol/L      Calcium 8.4 mg/dL      Total Protein  6.5 g/dL      Albumin 3.70 g/dL      ALT (SGPT) 15 U/L      AST (SGOT) 21 U/L      Alkaline Phosphatase 93 U/L      Total Bilirubin 0.7 mg/dL      eGFR Non African Amer 62 mL/min/1.73      Globulin 2.8 gm/dL      A/G Ratio 1.3 g/dL      BUN/Creatinine Ratio 11.1     Anion Gap 12.5 mmol/L     Narrative:       The MDRD GFR formula is only valid for adults with stable renal function between ages 18 and 70.    Troponin [[id number]]  (Normal) Collected:  09/18/19 1659    Specimen:  Blood Updated:  09/18/19 1741     Troponin I <0.030 ng/mL     Narrative:       Troponin I Reference Range:    0.00-0.03  Negative.  Repeat testing in 4-6 hours if clinically indicated.    0.04-0.29  Suspicious for myocardial injury. Serial measurements and clinical  correlation may be necessary to confirm or exclude diagnosis of acute  coronary syndrome.  Repeat testing in 4-6 hours if indicated.     >0.29 Consistent with myocardial injury.  Recommend clinical and laboratory correlation.     Results my be falsely decreased if patient taking Biotin.     Protime-INR [[id number]]  (Normal) Collected:  09/18/19 1659    Specimen:  Blood Updated:  09/18/19 1741     Protime 10.6 Seconds      INR 1.03    aPTT [[id number]]  (Normal) Collected:  09/18/19 1659    Specimen:  Blood Updated:  09/18/19 1741     PTT 25.6 seconds     CBC & Differential [[id number]] Collected:  09/18/19 1659    Specimen:  Blood Updated:  09/18/19 1730    Narrative:       The following orders were created for panel order CBC & Differential.  Procedure                               Abnormality         Status                     ---------                               -----------         ------                     CBC Auto Differential[[id number]]        Abnormal            Final result                 Please view results for these tests on the individual orders.    CBC Auto Differential [[id number]]  (Abnormal) Collected:  09/18/19 1659    Specimen:  Blood Updated:  09/18/19 1730      WBC 6.30 10*3/mm3      RBC 4.68 10*6/mm3      Hemoglobin 14.7 g/dL      Hematocrit 42.9 %      MCV 91.6 fL      MCH 31.4 pg      MCHC 34.3 g/dL      RDW 12.6 %      RDW-SD 40.3 fl      MPV 9.5 fL      Platelets 191 10*3/mm3      Neutrophil % 51.7 %      Lymphocyte % 32.6 %      Monocyte % 12.6 %      Eosinophil % 2.5 %      Basophil % 0.6 %      Neutrophils, Absolute 3.20 10*3/mm3      Lymphocytes, Absolute 2.10 10*3/mm3      Monocytes, Absolute 0.80 10*3/mm3      Eosinophils, Absolute 0.20 10*3/mm3      Basophils, Absolute 0.00 10*3/mm3      nRBC 0.0 /100 WBC              No results found for: BLOODCX   No results found for: URINECX   No results found for: WOUNDCX   No results found for: RESPCX   No results found for: STOOLCX   No results found for: STOOLCXY   No results found for: MRSACX   No results found for: VRECX   No results found for: CRECX   No components found for: AFBSTAINCX   No results found for: AFBCX   No results found for: AFBCXBLD   No results found for: FUNGUSCX   No components found for: GMSSTAIN   No results found for: KOHPREP   No results found for: ANACX   No results found for: BODYFLDCX   No results found for: CSFCX   No results found for: CULTURE   No results found for: THROATCX   No results found for: THROATCXBS   No results found for: ICECX   No results found for: DICECX   No results found for: GCCX      Results for orders placed during the hospital encounter of 06/17/19   Adult Transthoracic Echo Complete W/ Cont if Necessary Per Protocol    Narrative Technically satisfactory study.  Mitral valve is thickened with adequate opening motion.  Aortic valve is normal.  Tricuspid valve is normal.  Left atrium is normal in size.  Left ventricle is normal in size and contractility with ejection fraction   of 60%.  Atrial septum is intact.  No pericardial effusion or intracardiac thrombus is seen.  The right atrium and right ventricle are normal.  Aorta is normal.    Impression  Thickened mitral  valve with adequate opening motion.  Normal left ventricle size and contractility with ejection fraction of   60%.       Imaging Results (all)     Procedure Component Value Units Date/Time    MRI Brain Without Contrast [[id number]] Collected:  09/18/19 1743     Updated:  09/18/19 1755    Narrative:          DATE OF EXAM:   9/18/2019 5:10 PM     PROCEDURE:   MRI BRAIN WO CONTRAST-     INDICATIONS:   hx stroke, off balance, dizziness, history of melanoma     COMPARISON:  No Comparisons Available     TECHNIQUE:   Routine magnetic resonance imaging of the brain was performed without  administration of contrast.     FINDINGS:   There is no abnormal diffusion restriction to suggest acute ischemia.  There is prominence of the ventricles and cortical sulci consistent with  generalized cerebral atrophy. Moderate areas of periventricular and  subcortical white matter T2/FLAIR hyperintense signal similar to prior  study most consistent with changes of chronic small vessel disease.  There is evidence of prior lacunar infarction involving the left  cerebellum which appears stable. There is no abnormal extra-axial fluid  collection. No findings of intracranial hemorrhage. The major T2  weighted intracranial vascular flow voids are patent. Paranasal sinuses  well aerated. Negative for mastoid effusion. Pituitary gland normal in  size. Corpus callosum and midline structures within normal limits.  Negative for Chiari malformation.       Impression:       1. Negative for acute ischemia, mass, or hemorrhage.  2. Stable chronic findings above.     Electronically Signed By-Cedric Lehman On:9/18/2019 5:52 PM  This report was finalized on [id number] by  Cedric Lehman, .            Condition on Discharge: Stable    Vital Signs  Temp:  [97.5 °F (36.4 °C)-97.9 °F (36.6 °C)] 97.9 °F (36.6 °C)  Heart Rate:  [46-78] 55  Resp:  [14-18] 17  BP: (130-149)/(65-82) 138/65    Physical Exam:  Well-developed well-nourished female sitting up in bed awake  and alert no acute distress; lungs clear to auscultation bilaterally; CV irregular; abdomen soft and nontender; extremities no edema or calf tenderness.      Discharge Disposition  Home or Self Care    Discharge Medications     Discharge Medications      New Medications      Instructions Start Date   amLODIPine 5 MG tablet  Commonly known as:  NORVASC   5 mg, Oral, Every 24 Hours Scheduled, For high blood pressure.   Start Date:  9/21/2019     budesonide-formoterol 160-4.5 MCG/ACT inhaler  Commonly known as:  SYMBICORT   2 puffs, Inhalation, 2 Times Daily - RT, Rinse mouth after using.         Changes to Medications      Instructions Start Date   metoprolol succinate XL 25 MG 24 hr tablet  Commonly known as:  TOPROL-XL  What changed:    · medication strength  · how much to take   25 mg, Oral, Daily   Start Date:  9/21/2019        Continue These Medications      Instructions Start Date   alendronate 70 MG tablet  Commonly known as:  FOSAMAX   70 mg, Oral, Weekly      aspirin 81 MG chewable tablet   81 mg, Oral, Daily      atorvastatin 10 MG tablet  Commonly known as:  LIPITOR   20 mg, Oral, Nightly      Cholecalciferol 98054 units tablet   2,000 Units, Oral, 2 Times Daily      clopidogrel 75 MG tablet  Commonly known as:  PLAVIX   75 mg, Oral, Daily      cyanocobalamin 250 MCG tablet  Commonly known as:  VITAMIN B-12   250 mcg, Oral, Daily      ezetimibe 10 MG tablet  Commonly known as:  ZETIA   10 mg, Oral, Daily      hydrochlorothiazide 12.5 MG capsule  Commonly known as:  MICROZIDE   12.5 mg, Oral, Daily      levETIRAcetam 500 MG tablet  Commonly known as:  KEPPRA   500 mg, Oral, 2 Times Daily      levothyroxine 25 MCG tablet  Commonly known as:  SYNTHROID, LEVOTHROID   25 mcg, Oral, Daily      pantoprazole 20 MG EC tablet  Commonly known as:  PROTONIX   20 mg, Oral, Daily      pregabalin 100 MG capsule  Commonly known as:  LYRICA   100 mg, Oral, 3 Times Daily      sulfaSALAzine 500 MG tablet  Commonly known as:   AZULFIDINE   1,000 mg, Oral, 2 Times Daily      vitamin b complex capsule capsule   1 capsule, Oral, Daily             Discharge Diet:   Diet Instructions     Diet: Cardiac      Discharge Diet:  Cardiac          Activity at Discharge:   Activity Instructions     Activity as Tolerated            Follow-up Appointments  Future Appointments   Date Time Provider Department Center   1/21/2020  9:00 AM Ciera Garcia MD [facility] None   2/11/2020  9:50 AM Real Winn MD [facility] NA     Additional Instructions for the Follow-ups that You Need to Schedule     Call MD With Problems / Concerns   As directed      Instructions: Call [phone] or email [email or web address] for problems or concerns.    Order Comments:  Instructions: Call [phone] or email [email or web address] for problems or concerns.          Discharge Follow-up with PCP   As directed       Currently Documented PCP:    Keith Alston MD    PCP Phone Number:    [phone]     Follow Up Details:  72 hrs               Test Results Pending at Discharge       Risk for Readmission (LACE) Score: 6 (9/20/2019  6:00 AM)          Pam Pete MD  09/20/19  4:03 PM    Time: Discharge time 30 minutes

## 2019-09-20 NOTE — PROGRESS NOTES
Exercise Oximetry    Patient Name:Delphine Rob   MRN: [id number]   Date: 09/20/19             ROOM AIR BASELINE   SpO2% 96   Heart Rate 46   Blood Pressure      EXERCISE ON ROOM AIR SpO2% EXERCISE ON O2 @  LPM SpO2%   1 MINUTE 95 1 MINUTE    2 MINUTES 96 2 MINUTES    3 MINUTES 95 3 MINUTES    4 MINUTES 95 4 MINUTES    5 MINUTES 94 5 MINUTES    6 MINUTES 95 6 MINUTES               Distance Walked   Distance Walked   Dyspnea (Julio Scale)   Dyspnea (Julio Scale)   Fatigue (Julio Scale)   Fatigue (Julio Scale)   SpO2% Post Exercise   SpO2% Post Exercise   HR Post Exercise   HR Post Exercise   Time to Recovery   Time to Recovery     Comments: Patient does not require home O2.   Nroma Sanchez, CRT

## 2019-09-21 NOTE — OUTREACH NOTE
Prep Survey      Responses   Facility patient discharged from?  [facility]   Is patient eligible?  Yes   Discharge diagnosis  Near Syncope   Does the patient have one of the following disease processes/diagnoses(primary or secondary)?  Other   Does the patient have Home health ordered?  No   Is there a DME ordered?  No   Prep survey completed?  Yes          Stacey Michael RN

## 2019-09-23 PROBLEM — R07.9 CHEST PAIN: Status: ACTIVE | Noted: 2019-01-01

## 2019-09-23 NOTE — TELEPHONE ENCOUNTER
"Call to bo on cell number, pt c/o of \"swimmy headed\", cannot hold head straight.  Just checked bp 148/102-50.  Next appt 10/7/19 - advised to go to ER.  Bo agreed, will be taking her to er  "

## 2019-09-23 NOTE — TELEPHONE ENCOUNTER
Call from dtr/yolanda smith with c/o recent hospital d/c on monitor, turned in today, but pt had episode last night nauseated/clammy; weak sluggish today.  Pt normal bp 106/ 60's, bp today 156/84-48  Recent bp check 148/89-42.    dtr voiced concerns hr keeps dropping. Advised to go to ER if continues to worsen / persist.  Verbal understanding.

## 2019-09-23 NOTE — OUTREACH NOTE
Medical Week 1 Survey      Responses   Facility patient discharged from?  [facility]   Does the patient have one of the following disease processes/diagnoses(primary or secondary)?  Other   Is there a successful TCM telephone encounter documented?  No   Week 1 attempt successful?  No   Revoke  Other transitional program [Noted that daughter is taking pt to ER for low HR ]          Peggy Headley LPN

## 2019-09-24 NOTE — PLAN OF CARE
Problem: Patient Care Overview  Goal: Plan of Care Review  Outcome: Ongoing (interventions implemented as appropriate)   09/24/19 0612   Coping/Psychosocial   Plan of Care Reviewed With patient   Plan of Care Review   Progress improving     Goal: Discharge Needs Assessment  Outcome: Ongoing (interventions implemented as appropriate)   09/24/19 0612   Discharge Needs Assessment   Readmission Within the Last 30 Days no previous admission in last 30 days   Concerns to be Addressed no discharge needs identified;denies needs/concerns at this time   Patient/Family Anticipates Transition to home with family;home with help/services   Patient/Family Anticipated Services at Transition none   Transportation Anticipated family or friend will provide   Equipment Needed After Discharge none   Disability   Equipment Currently Used at Home bipap/cpap;cane, quad;nebulizer;oxygen;walker, rolling;grab bar;power chair,(recliner lift)       Problem: Cardiac: ACS (Acute Coronary Syndrome) (Adult)  Goal: Signs and Symptoms of Listed Potential Problems Will be Absent, Minimized or Managed (Cardiac: ACS)  Outcome: Ongoing (interventions implemented as appropriate)   09/24/19 0612   Goal/Outcome Evaluation   Problems Assessed (Acute Coronary Syndrome) all   Problems Present (Acute Coronary Syn) chest pain (angina);dysrhythmia/arrhythmia;situational response

## 2019-09-24 NOTE — PROGRESS NOTES
[facility]      Patient Care Team:  Keith Alston MD as PCP - General        Chief Complaint:  F/u chest pain, palpitations    Subjective: Patient reports \"heart pounding\" and shortness of breath with exertion. Denies any overt chest pains.         Objective:    Review of Systems   Constitutional: Positive for fatigue. Negative for chills and fever.   Cardiovascular: Positive for palpitations. Negative for chest pain and leg swelling.   Gastrointestinal: Negative for abdominal pain.         Vital Signs  Temp:  [97.4 °F (36.3 °C)-98.1 °F (36.7 °C)] 97.7 °F (36.5 °C)  Heart Rate:  [43-74] 60  Resp:  [14-20] 17  BP: (133-163)/(61-97) 138/64  Oxygen Therapy  SpO2: 95 %  Pulse Oximetry Type: Intermittent  Device (Oxygen Therapy): room air  Flowsheet Rows      First Filed Value   Admission Height  157.5 cm (62\") Documented at 09/23/2019 1713   Admission Weight  77.1 kg (169 lb 15.6 oz) Documented at 09/23/2019 1713        Intake & Output (last 3 days)       09/21 0701 - 09/22 0700 09/22 0701 - 09/23 0700 09/23 0701 - 09/24 0700 09/24 0701 - 09/25 0700            Urine Unmeasured Occurrence    2 x        Lines, Drains & Airways    Active LDAs     Name:   Placement date:   Placement time:   Site:   Days:    Peripheral IV 09/18/19 1658 Left Forearm   09/18/19    1658    Forearm   5    Peripheral IV 09/23/19 1741 Left Forearm   09/23/19    1741    Forearm   less than 1                Physical Exam:    General: NAD, resting in bed  Card: S1, S2, no murmurs  Resp: CTA b/l, no r/r/w  GI: soft, nt, nd, +bs  Skin: warm, dry, intact, no rashes  Extremities: no c/c/e  Neuro: CN II-XII grossly intact, no focal deficits  Psych: appropriate mood and affect        Results Review:      Results from last 7 days   Lab Units 09/24/19  0555 09/23/19  1748 09/19/19  0640   WBC 10*3/mm3 5.60 6.90 4.50   HEMOGLOBIN g/dL 15.3 14.8 14.6   HEMATOCRIT % 45.6 44.2 43.7   PLATELETS 10*3/mm3 169 179 168     Results from last " 7 days   Lab Units 09/24/19  0555 09/23/19  1748 09/20/19  0525 09/19/19  0640 09/18/19  1659   SODIUM mmol/L 141 135* 141 141 136   POTASSIUM mmol/L 4.4 3.4* 4.0 4.5 3.5*   CHLORIDE mmol/L 108 101 107 104 104   CO2 mmol/L 24.0 23.0 25.0 29.0 23.0   BUN mg/dL 10 10 9 10 10   CREATININE mg/dL 0.90 0.80 0.80 1.00 0.90   CALCIUM mg/dL 8.4* 8.6* 8.7* 8.8* 8.4*   BILIRUBIN mg/dL  --   --   --  0.9 0.7   ALK PHOS U/L  --   --   --  81 93*   ALT (SGPT) U/L  --   --   --  16 15   AST (SGOT) U/L  --   --   --  21 21   GLUCOSE mg/dL 99 104* 106* 103* 97       Results from last 7 days   Lab Units 09/24/19  0555 09/23/19  1748 09/20/19  0525   MAGNESIUM mg/dL 2.2 1.7* 1.9              Imaging Results (last 24 hours)     Procedure Component Value Units Date/Time    XR Chest 1 View [[id number]] Collected:  09/23/19 1835     Updated:  09/23/19 1838    Narrative:       Examination: XR CHEST 1 VW-     Date of Exam: 9/23/2019 6:25 PM     Indication: Chest pain.     Comparison: 6/19/2019     Technique: 1 view of the chest      Findings:  There are no airspace consolidations. No pleural effusions. No  pneumothorax. The heart size is normal. The pulmonary vasculature  appears within normal limits. Stable granulomatous calcifications No  acute osseous abnormality identified.       Impression:       No acute cardiopulmonary process.     Electronically Signed By-Silvina Boyce On:9/23/2019 6:36 PM  This report was finalized on [id number] by  Silvina Boyce, .          I have personally reviewed the patient's new imaging and lab results.          ECG/EMG Results (most recent)     Procedure Component Value Units Date/Time    ECG 12 Lead [[id number]] Collected:  09/23/19 1727     Updated:  09/24/19 1106    Narrative:       HEART RATE= 70  bpm  RR Interval= 852  ms  MT Interval= 178  ms  P Horizontal Axis= 0  deg  P Front Axis= 74  deg  QRSD Interval= 96  ms  QT Interval= 433  ms  QRS Axis= 4  deg  T Wave Axis= 50  deg  - ABNORMAL ECG -  Sinus  rhythm  Ventricular trigeminy  Inferior infarct, old  Electronically Signed By: Saw Woody (EVELYNE) 24-Sep-2019 11:06:25  Date and Time of Study: 2019-09-23 17:27:22          Results for orders placed during the hospital encounter of 06/17/19   Adult Transthoracic Echo Complete W/ Cont if Necessary Per Protocol    Narrative Technically satisfactory study.  Mitral valve is thickened with adequate opening motion.  Aortic valve is normal.  Tricuspid valve is normal.  Left atrium is normal in size.  Left ventricle is normal in size and contractility with ejection fraction   of 60%.  Atrial septum is intact.  No pericardial effusion or intracardiac thrombus is seen.  The right atrium and right ventricle are normal.  Aorta is normal.    Impression  Thickened mitral valve with adequate opening motion.  Normal left ventricle size and contractility with ejection fraction of   60%.           Medication Review:     Scheduled Meds:  amLODIPine 5 mg Oral Q24H   aspirin 81 mg Oral Daily   atorvastatin 20 mg Oral Nightly   budesonide-formoterol 2 puff Inhalation BID - RT   Cholecalciferol 400 Units Oral Daily   clopidogrel 75 mg Oral Daily   enoxaparin 40 mg Subcutaneous Q24H   levETIRAcetam 500 mg Oral Q12H   levothyroxine 25 mcg Oral Q AM   pantoprazole 40 mg Oral Q AM   pregabalin 100 mg Oral TID   sodium chloride 10 mL Intravenous Q12H   sulfaSALAzine 1,000 mg Oral BID   cyanocobalamin 250 mcg Oral Daily     Continuous Infusions:   PRN Meds:.•  acetaminophen **OR** acetaminophen **OR** acetaminophen  •  magnesium sulfate **OR** magnesium sulfate **OR** magnesium sulfate  •  melatonin  •  ondansetron  •  potassium chloride  •  potassium chloride  •  [COMPLETED] Insert peripheral IV **AND** sodium chloride  •  sodium chloride      I have reviewed the patient's current medication list    Assessment/Plan     Chest pain  - ACS ruled out      Bradycardia, reported by patient  - Hold metoprolol  - telemetry  - awaiting recommendations  from cardiology   - History of stable ventricular ectopy, PVCs     CAD, chronic with history of CABG with stents  - Continue Plavix, aspirin     Hypokalemia  - replaced      Hypo-magnesium  - replaced      Osteoporosis  - Hold Fosamax     Hypertension, chronic  - Continue amlodipine; hold hydrochlorothiazide     HLD, chronic  - Continue atorvastatin, hold Zetia     Dietary supplementation  - Continue vitamin B12, vitamin D     COPD, chronic  - Continue Symbicort  - stable, no exacerbation      Seizure disorder  - Continue Keppra     Hypothyroidism  - TSH 5.76  - consider increasing synthroid     GERD, chronic  - Continue Protonix     RA, chronic  - Continue sulfasalazine, Lyrica     DVT prophylaxis  - Lovenox       Plan for disposition: home once ok with cardiology     Remy Dias DO  09/24/19  4:21 PM

## 2019-09-24 NOTE — CONSULTS
Referring Provider: Remy Dias DO  Reason for Consultation:  Chest pain  Bradycardia  Spells of nausea and shortness of breath    Patient Care Team:  Keith Alston MD as PCP - General    Chief complaint  Chest discomfort spells of nausea    Subjective .     History of present illness:  Delpihne Rob is a 73 y.o. female who presents with history of multiple cardiac and noncardiac problems as outlined in the assessment came to the emergency room with symptoms of multiple episodes of spells that usually start as nausea shortness of breath and subsequently would have chest discomfort.  She will have sweating dizziness.  She would feel cold and clammy sometimes.  She has history of CABG and stent placement in the past.  Patient's heart rate would be erratic and sometimes would be dropping to 30/min.  Patient had similar problems with dizziness and near syncope in the past and patient has been observed.  Symptoms have gotten worse lately.  No other associated aggravating or elevating factors..     Consideration for possible pacemaker implantation was considered in the past.     ROS      Since I have last seen, the patient has been without any chest discomfort ,shortness of breath, palpitations, or syncope.  Denies having any headache ,abdominal pain ,nausea, vomiting , diarrhea constipation, loss of weight or loss of appetite.  Denies having any excessive bruising ,hematuria or blood in the stool.    Review of systems negative except as indicated      History  Past Medical History:   Diagnosis Date   • Arthritis    • Atrial fibrillation (CMS/HCC)    • Bronchitis    • Chickenpox    • COPD (chronic obstructive pulmonary disease) (CMS/HCC)    • Cramps of lower extremity     bilateral with weakness   • Heart disease    • Hepatitis C 2013    treatment x 11 months   • High cholesterol    • History of degenerative disc disease    • HLD (hyperlipidemia)    • HTN (hypertension)    • Hypothyroidism    • Measles    •  Seizure disorder (CMS/HCC)    • Skin melanoma (CMS/HCC) 2013    s/p lymph node removal on the right   • Sleep apnea    • Stroke (CMS/HCC)    • Whooping cough        Past Surgical History:   Procedure Laterality Date   • APPENDECTOMY     • AXILLARY NODE DISSECTION Right 05/2014   • CARDIAC CATHETERIZATION N/A 6/19/2019    Procedure: Left Heart Cath;  Surgeon: Real Winn MD;  Location:  [facility];  Service: Cardiovascular   • CARDIAC CATHETERIZATION N/A 6/19/2019    Procedure: Saphenous Vein Graft;  Surgeon: Real Winn MD;  Location:  [facility];  Service: Cardiovascular   • CARDIAC CATHETERIZATION N/A 6/19/2019    Procedure: Left ventriculography;  Surgeon: Real Winn MD;  Location:  [facility];  Service: Cardiovascular   • CARDIAC CATHETERIZATION N/A 6/19/2019    Procedure: Coronary angiography;  Surgeon: Real Winn MD;  Location: [facility] CATH INVASIVE LOCATION;  Service: Cardiovascular   • CATARACT EXTRACTION  01/2017    x 2   • CHOLECYSTECTOMY  2004   • COLONOSCOPY  04/05/2017   • CORONARY ANGIOPLASTY WITH STENT PLACEMENT      stent placement after CABG   • CORONARY ARTERY BYPASS GRAFT  01/20/2010   • CORONARY ARTERY BYPASS GRAFT     • FEMORAL POPLITEAL BYPASS Right 07/20/2017    Dr. Palmer   • INCISIONAL HERNIA REPAIR  09/28/2016    lap. Dr. West   • MOLE REMOVAL Right     arm   • OTHER SURGICAL HISTORY      laser surgery X 2   • PARTIAL HYSTERECTOMY     • THYROIDECTOMY, PARTIAL      goiter removed 1/2 thyroid       Family History   Problem Relation Age of Onset   • Hypertension Mother    • Cancer Mother    • Gout Mother    • Asthma Mother    • Cancer Father    • Heart disease Brother    • Diabetes Other    • Cancer Other    • Tuberculosis Other        Social History     Tobacco Use   • Smoking status: Former Smoker     Last attempt to quit: 6/17/2009     Years since quitting: 10.2   • Smokeless tobacco: Never Used   Substance  Use Topics   • Alcohol use: No     Frequency: Monthly or less     Comment: beer   • Drug use: No        Medications Prior to Admission   Medication Sig Dispense Refill Last Dose   • alendronate (FOSAMAX) 70 MG tablet Take 1 tablet by mouth 1 (One) Time Per Week. 4 tablet 3    • amLODIPine (NORVASC) 5 MG tablet Take 1 tablet by mouth Daily. For high blood pressure. 30 tablet 0    • aspirin 81 MG chewable tablet Chew 1 tablet Daily. 30 tablet 0 9/18/2019 at Unknown time   • atorvastatin (LIPITOR) 10 MG tablet Take 20 mg by mouth Every Night.   9/17/2019 at Unknown time   • B Complex Vitamins (VITAMIN B COMPLEX) capsule capsule Take 1 capsule by mouth Daily.   9/18/2019 at Unknown time   • budesonide-formoterol (SYMBICORT) 160-4.5 MCG/ACT inhaler Inhale 2 puffs 2 (Two) Times a Day for 30 days. Rinse mouth after using. 1 inhaler 0    • Cholecalciferol 47962 units tablet Take 2,000 Units by mouth 2 (Two) Times a Day.   9/18/2019 at Unknown time   • clopidogrel (PLAVIX) 75 MG tablet Take 75 mg by mouth Daily.   9/18/2019 at Unknown time   • cyanocobalamin (VITAMIN B-12) 250 MCG tablet Take 250 mcg by mouth Daily.   9/18/2019 at Unknown time   • ezetimibe (ZETIA) 10 MG tablet Take 10 mg by mouth Daily.   9/18/2019 at Unknown time   • hydrochlorothiazide (MICROZIDE) 12.5 MG capsule Take 12.5 mg by mouth Daily.   9/18/2019 at Unknown time   • levETIRAcetam (KEPPRA) 500 MG tablet Take 500 mg by mouth 2 (Two) Times a Day.   9/18/2019 at Unknown time   • levothyroxine (SYNTHROID, LEVOTHROID) 25 MCG tablet Take 25 mcg by mouth Daily.   9/18/2019 at Unknown time   • metoprolol succinate XL (TOPROL-XL) 25 MG 24 hr tablet Take 1 tablet by mouth Daily. 30 tablet 0    • pantoprazole (PROTONIX) 20 MG EC tablet Take 20 mg by mouth Daily.   9/18/2019 at Unknown time   • pregabalin (LYRICA) 100 MG capsule Take 100 mg by mouth 3 (Three) Times a Day.   9/18/2019 at Unknown time   • sulfaSALAzine (AZULFIDINE) 500 MG tablet Take 1,000 mg by  "mouth 2 (Two) Times a Day.   9/18/2019 at Unknown time         Penicillamine and Penicillins    Scheduled Meds:  amLODIPine 5 mg Oral Q24H   aspirin 81 mg Oral Daily   atorvastatin 20 mg Oral Nightly   budesonide-formoterol 2 puff Inhalation BID - RT   Cholecalciferol 400 Units Oral Daily   clopidogrel 75 mg Oral Daily   enoxaparin 40 mg Subcutaneous Q24H   levETIRAcetam 500 mg Oral Q12H   levothyroxine 25 mcg Oral Q AM   pantoprazole 40 mg Oral Q AM   pregabalin 100 mg Oral TID   sodium chloride 10 mL Intravenous Q12H   sulfaSALAzine 1,000 mg Oral BID   cyanocobalamin 250 mcg Oral Daily     Continuous Infusions:   PRN Meds:.•  acetaminophen **OR** acetaminophen **OR** acetaminophen  •  magnesium sulfate **OR** magnesium sulfate **OR** magnesium sulfate  •  melatonin  •  ondansetron  •  potassium chloride  •  potassium chloride  •  [COMPLETED] Insert peripheral IV **AND** sodium chloride  •  sodium chloride    Objective     VITAL SIGNS  Vitals:    09/23/19 2007 09/23/19 2023 09/23/19 2100 09/23/19 2300   BP: 148/63 133/61 152/79 158/97   BP Location:   Left arm Left arm   Patient Position:   Sitting Sitting   Pulse: 67 70  74   Resp:  20 19 17   Temp:   97.5 °F (36.4 °C) 98.1 °F (36.7 °C)   TempSrc:   Oral Oral   SpO2: 98% 98% 97% 95%   Weight:   74.6 kg (164 lb 7.4 oz)    Height:   157.5 cm (62\")        Flowsheet Rows      First Filed Value   Admission Height  157.5 cm (62\") Documented at 09/23/2019 1713   Admission Weight  77.1 kg (169 lb 15.6 oz) Documented at 09/23/2019 1713           TELEMETRY: Sinus rhythm    Physical Exam:  The patient is alert, oriented and in no distress.  Vital signs as noted above.  Head and neck revealed no carotid bruits or jugular venous distention.  No thyromegaly or lymph adenopathy is present  Lungs clear.  No wheezing.  Breath sounds are normal bilaterally.  Heart normal first and second heart sounds.No murmur.  No precordial rub is present.  No gallop is present.  Abdomen soft and " nontender.  No organomegaly is present.  Extremities with good peripheral pulses without any pedal edema.  Skin warm and dry.  Musculoskeletal system is grossly normal  CNS grossly normal      Results Review:   I reviewed the patient's new clinical results.  Lab Results (last 24 hours)     Procedure Component Value Units Date/Time    Troponin [[id number]] Collected:  09/24/19 0555    Specimen:  Blood Updated:  09/24/19 0635    TSH [[id number]] Collected:  09/24/19 0555    Specimen:  Blood Updated:  09/24/19 0635    Magnesium [[id number]] Collected:  09/24/19 0555    Specimen:  Blood Updated:  09/24/19 0635    Basic Metabolic Panel [[id number]] Collected:  09/24/19 0555    Specimen:  Blood Updated:  09/24/19 0635    CBC Auto Differential [[id number]] Collected:  09/24/19 0555    Specimen:  Blood Updated:  09/24/19 0635    Troponin [[id number]]  (Normal) Collected:  09/24/19 0007    Specimen:  Blood Updated:  09/24/19 0100     Troponin I <0.030 ng/mL     Narrative:       Troponin I Reference Range:    0.00-0.03  Negative.  Repeat testing in 4-6 hours if clinically indicated.    0.04-0.29  Suspicious for myocardial injury. Serial measurements and clinical  correlation may be necessary to confirm or exclude diagnosis of acute  coronary syndrome.  Repeat testing in 4-6 hours if indicated.     >0.29 Consistent with myocardial injury.  Recommend clinical and laboratory correlation.     Results my be falsely decreased if patient taking Biotin.     Extra Tubes [[id number]] Collected:  09/23/19 1748    Specimen:  Blood, Venous Line Updated:  09/23/19 1900    Narrative:       The following orders were created for panel order Extra Tubes.  Procedure                               Abnormality         Status                     ---------                               -----------         ------                     Light Blue Top[[id number]]                                   Final result                 Please view results for these tests  on the individual orders.    Light Blue Top [[id number]] Collected:  09/23/19 1748    Specimen:  Blood Updated:  09/23/19 1900     Extra Tube hold for add-on     Comment: Auto resulted       Basic Metabolic Panel [[id number]]  (Abnormal) Collected:  09/23/19 1748    Specimen:  Blood from Hand, Left Updated:  09/23/19 1846     Glucose 104 mg/dL      BUN 10 mg/dL      Creatinine 0.80 mg/dL      Sodium 135 mmol/L      Potassium 3.4 mmol/L      Chloride 101 mmol/L      CO2 23.0 mmol/L      Calcium 8.6 mg/dL      eGFR Non African Amer 71 mL/min/1.73      BUN/Creatinine Ratio 12.5     Anion Gap 14.4 mmol/L     Narrative:       The MDRD GFR formula is only valid for adults with stable renal function between ages 18 and 70.    Magnesium [[id number]]  (Abnormal) Collected:  09/23/19 1748    Specimen:  Blood from Hand, Left Updated:  09/23/19 1846     Magnesium 1.7 mg/dL     Troponin [[id number]]  (Normal) Collected:  09/23/19 1748    Specimen:  Blood from Hand, Left Updated:  09/23/19 1827     Troponin I <0.030 ng/mL     Narrative:       Troponin I Reference Range:    0.00-0.03  Negative.  Repeat testing in 4-6 hours if clinically indicated.    0.04-0.29  Suspicious for myocardial injury. Serial measurements and clinical  correlation may be necessary to confirm or exclude diagnosis of acute  coronary syndrome.  Repeat testing in 4-6 hours if indicated.     >0.29 Consistent with myocardial injury.  Recommend clinical and laboratory correlation.     Results my be falsely decreased if patient taking Biotin.     CBC & Differential [[id number]] Collected:  09/23/19 1748    Specimen:  Blood Updated:  09/23/19 1804    Narrative:       The following orders were created for panel order CBC & Differential.  Procedure                               Abnormality         Status                     ---------                               -----------         ------                     CBC Auto Differential[[id number]]        Normal               Final result                 Please view results for these tests on the individual orders.    CBC Auto Differential [[id number]]  (Normal) Collected:  09/23/19 1748    Specimen:  Blood from Hand, Left Updated:  09/23/19 1804     WBC 6.90 10*3/mm3      RBC 4.77 10*6/mm3      Hemoglobin 14.8 g/dL      Hematocrit 44.2 %      MCV 92.8 fL      MCH 31.0 pg      MCHC 33.4 g/dL      RDW 13.0 %      RDW-SD 42.0 fl      MPV 9.6 fL      Platelets 179 10*3/mm3      Neutrophil % 52.5 %      Lymphocyte % 32.9 %      Monocyte % 11.9 %      Eosinophil % 2.4 %      Basophil % 0.3 %      Neutrophils, Absolute 3.60 10*3/mm3      Lymphocytes, Absolute 2.30 10*3/mm3      Monocytes, Absolute 0.80 10*3/mm3      Eosinophils, Absolute 0.20 10*3/mm3      Basophils, Absolute 0.00 10*3/mm3      nRBC 0.1 /100 WBC           Imaging Results (last 24 hours)     Procedure Component Value Units Date/Time    XR Chest 1 View [[id number]] Collected:  09/23/19 1835     Updated:  09/23/19 1838    Narrative:       Examination: XR CHEST 1 VW-     Date of Exam: 9/23/2019 6:25 PM     Indication: Chest pain.     Comparison: 6/19/2019     Technique: 1 view of the chest      Findings:  There are no airspace consolidations. No pleural effusions. No  pneumothorax. The heart size is normal. The pulmonary vasculature  appears within normal limits. Stable granulomatous calcifications No  acute osseous abnormality identified.       Impression:       No acute cardiopulmonary process.     Electronically Signed By-Silvina Boyce On:9/23/2019 6:36 PM  This report was finalized on [id number] by  Silvina Boyce, .      LAB RESULTS (LAST 7 DAYS)    CBC  Results from last 7 days   Lab Units 09/23/19  1748 09/19/19  0640 09/18/19  1659   WBC 10*3/mm3 6.90 4.50 6.30   RBC 10*6/mm3 4.77 4.69 4.68   HEMOGLOBIN g/dL 14.8 14.6 14.7   HEMATOCRIT % 44.2 43.7 42.9   MCV fL 92.8 93.2 91.6   PLATELETS 10*3/mm3 179 168 191       BMP  Results from last 7 days   Lab Units 09/23/19  1703  09/20/19  0525 09/19/19  0640 09/18/19  1659   SODIUM mmol/L 135* 141 141 136   POTASSIUM mmol/L 3.4* 4.0 4.5 3.5*   CHLORIDE mmol/L 101 107 104 104   CO2 mmol/L 23.0 25.0 29.0 23.0   BUN mg/dL 10 9 10 10   CREATININE mg/dL 0.80 0.80 1.00 0.90   GLUCOSE mg/dL 104* 106* 103* 97   MAGNESIUM mg/dL 1.7* 1.9 1.9  --    PHOSPHORUS mg/dL  --   --  3.5  --        CMP Results from last 7 days   Lab Units 09/23/19  1748 09/20/19  0525 09/19/19  0640 09/18/19  1659   SODIUM mmol/L 135* 141 141 136   POTASSIUM mmol/L 3.4* 4.0 4.5 3.5*   CHLORIDE mmol/L 101 107 104 104   CO2 mmol/L 23.0 25.0 29.0 23.0   BUN mg/dL 10 9 10 10   CREATININE mg/dL 0.80 0.80 1.00 0.90   GLUCOSE mg/dL 104* 106* 103* 97   ALBUMIN g/dL  --   --  3.40* 3.70   BILIRUBIN mg/dL  --   --  0.9 0.7   ALK PHOS U/L  --   --  81 93*   AST (SGOT) U/L  --   --  21 21   ALT (SGPT) U/L  --   --  16 15         BNP        TROPONIN  Results from last 7 days   Lab Units 09/24/19  0007   TROPONIN I ng/mL <0.030       CoAg  Results from last 7 days   Lab Units 09/18/19  1659   INR  1.03   APTT seconds 25.6       Creatinine Clearance  Estimated Creatinine Clearance: 60.1 mL/min (by C-G formula based on SCr of 0.8 mg/dL).    ABG        Radiology  Xr Chest 1 View    Result Date: 9/23/2019  No acute cardiopulmonary process.  Electronically Signed By-Silvina Boyce On:9/23/2019 6:36 PM This report was finalized on [id number] by  Silvina Boyce, .              EKG          I personally viewed and interpreted the patient's EKG/Telemetry data:    ECHOCARDIOGRAM:    Results for orders placed during the hospital encounter of 06/17/19   Adult Transthoracic Echo Complete W/ Cont if Necessary Per Protocol    Narrative Technically satisfactory study.  Mitral valve is thickened with adequate opening motion.  Aortic valve is normal.  Tricuspid valve is normal.  Left atrium is normal in size.  Left ventricle is normal in size and contractility with ejection fraction   of 60%.  Atrial septum  is intact.  No pericardial effusion or intracardiac thrombus is seen.  The right atrium and right ventricle are normal.  Aorta is normal.    Impression  Thickened mitral valve with adequate opening motion.  Normal left ventricle size and contractility with ejection fraction of   60%.               Cardiolite (Tc-99m Sestamibi) stress test      OTHER:     Assessment/Plan     Active Problems:    Chest pain          /////////////////  impression  ---------------------------  Patient has been having multiple episodes of spells starting with nausea with associated dizziness near syncope and chest discomfort.  Patient is having rapid heart rhythm intermittently.  Dizziness and near syncope.  Patient has sinus bradycardia which could be multifactorial including secondary to beta-blocker effect.  Patient has history of intermittent atrial fibrillation.  Tachy bradycardia syndrome.     -Status post CABG 01/20/2010 at [facility].  Status post stent placement after CABG ( details not available).     Cardiac catheterization and coronary angiography 6/19/2019 revealed  Left ventricle size and contractility is normal with proximal inferior wall hypokinesis.  Ejection fraction is 55%.  Left main artery is normal.  Left anterior descending artery is noted in the midsegment with filling of the LAD through LIMA.  Circumflex coronary artery is normal  Right coronary artery is a very small caliber vessel with 99% disease in the midsegment with filling of the distal vessel through collaterals.     RAUSCH to LAD is patent  SVG to marginal branch is patent  SVG to RCA is totally occluded.        Echocardiogram 17 2019 revealed mild mitral regurgitation and normal left ventricular function.    Carri scan Cardiolite test is negative for myocardial ischemia 11/13/2017     -Intermittent atrial fibrillation.  Patient is maintaining sinus rhythm.       -History of  stroke from which patient has recovered.      -Hypertension dyslipidemia  and hypothyroidism      -Status post thyroidectomy for cyst cholecystectomy and hysterectomy.      -History of hepatitis-C      -Allergy to penicillin      -Former smoker quit smoking October 2013.      -Status post  Malignant melanoma removal from right elbow area.  Patient did not need chemotherapy     -history of peripheral vascular disease.  CTA 06/30/2017 revealed right superficial femoral artery occlusion  ---------------.  Plan  ----------------------  Patient has spells concern for tachybradycardia syndrome.  Patient has significant bradycardia with heart rates in the 30/min and also having palpitations.  Hypokalemia  k 3.4-supplements.  Hypomagnesemia  Mg 1.7-supplements  Patient is having symptoms of dizziness and near syncope.  Patient also has premature ventricular contractions causing pseudo-bradycardia also.  Recently metoprolol dosage was decreased from 100 mg once a day to 25 mg a day.  Patient is now having palpitations.  If however patient has continued problems with tachyarrhythmias especially atrial fibrillation patient would benefit from permanent dual-chamber pacemaker implantation.  Patient recently had an echocardiogram in June 2019 and had cardiac catheterization in June 2019  Medications were reviewed and updated.  Close cardiac monitoring.  Further plan will depend on patient's progress.  ]]]]]]]]]]]]]]]]         Real Winn MD  09/24/19  6:38 AM

## 2019-09-24 NOTE — PLAN OF CARE
Problem: Patient Care Overview  Goal: Plan of Care Review  Outcome: Ongoing (interventions implemented as appropriate)    Goal: Individualization and Mutuality  Outcome: Ongoing (interventions implemented as appropriate)    Goal: Discharge Needs Assessment  Outcome: Ongoing (interventions implemented as appropriate)      Problem: Cardiac: ACS (Acute Coronary Syndrome) (Adult)  Goal: Signs and Symptoms of Listed Potential Problems Will be Absent, Minimized or Managed (Cardiac: ACS)  Outcome: Ongoing (interventions implemented as appropriate)

## 2019-09-24 NOTE — ED PROVIDER NOTES
Subjective   Chief complaint weakness chest pain elevated and slow heart rate    History of present illness 73-year-old female with history of heart problems who states that she is been generally weak she is been having a low heart rate in the 30s and elevated back up.  And she has tightness in her chest and some lightheadedness.  Nothing makes it better or worse and is been intermittent for the last few days.  The patient was in the hospital recently and was considered for pacemaker but had apparently some medication adjustments.  The patient called the cardiologist office today Dr. Winn and was told to go to the hospital no current pain.  Nothing makes better worse intermittent moderate severe            Review of Systems   Constitutional: Negative for chills and fever.   HENT: Negative for congestion and sinus pressure.    Eyes: Negative for photophobia and visual disturbance.   Respiratory: Positive for chest tightness and shortness of breath.    Cardiovascular: Positive for chest pain. Negative for leg swelling.   Gastrointestinal: Negative for abdominal pain and blood in stool.   Endocrine: Negative for cold intolerance and heat intolerance.   Genitourinary: Negative for difficulty urinating and dysuria.   Musculoskeletal: Negative for arthralgias and back pain.   Skin: Negative for color change and pallor.   Neurological: Positive for dizziness and light-headedness.   Psychiatric/Behavioral: Negative for agitation and behavioral problems.       Past Medical History:   Diagnosis Date   • Arthritis    • Atrial fibrillation (CMS/HCC)    • Bronchitis    • Chickenpox    • COPD (chronic obstructive pulmonary disease) (CMS/HCC)    • Cramps of lower extremity     bilateral with weakness   • Heart disease    • Hepatitis C 2013    treatment x 11 months   • High cholesterol    • History of degenerative disc disease    • HLD (hyperlipidemia)    • HTN (hypertension)    • Hypothyroidism    • Measles    • Seizure disorder  (CMS/[facility])    • Skin melanoma (CMS/[facility]) 2013    s/p lymph node removal on the right   • Sleep apnea    • Stroke (CMS/[facility])    • Whooping cough        Allergies   Allergen Reactions   • Penicillamine Unknown (See Comments)   • Penicillins Hives       Past Surgical History:   Procedure Laterality Date   • APPENDECTOMY     • AXILLARY NODE DISSECTION Right 05/2014   • CARDIAC CATHETERIZATION N/A 6/19/2019    Procedure: Left Heart Cath;  Surgeon: Real Winn MD;  Location:  [facility];  Service: Cardiovascular   • CARDIAC CATHETERIZATION N/A 6/19/2019    Procedure: Saphenous Vein Graft;  Surgeon: Real Winn MD;  Location:  [facility];  Service: Cardiovascular   • CARDIAC CATHETERIZATION N/A 6/19/2019    Procedure: Left ventriculography;  Surgeon: Real Winn MD;  Location:  [facility];  Service: Cardiovascular   • CARDIAC CATHETERIZATION N/A 6/19/2019    Procedure: Coronary angiography;  Surgeon: Real Winn MD;  Location: [facility] CATH INVASIVE LOCATION;  Service: Cardiovascular   • CATARACT EXTRACTION  01/2017    x 2   • CHOLECYSTECTOMY  2004   • COLONOSCOPY  04/05/2017   • CORONARY ANGIOPLASTY WITH STENT PLACEMENT      stent placement after CABG   • CORONARY ARTERY BYPASS GRAFT  01/20/2010   • CORONARY ARTERY BYPASS GRAFT     • FEMORAL POPLITEAL BYPASS Right 07/20/2017    Dr. Palmer   • INCISIONAL HERNIA REPAIR  09/28/2016    lap. Dr. West   • MOLE REMOVAL Right     arm   • OTHER SURGICAL HISTORY      laser surgery X 2   • PARTIAL HYSTERECTOMY     • THYROIDECTOMY, PARTIAL      goiter removed 1/2 thyroid       Family History   Problem Relation Age of Onset   • Hypertension Mother    • Cancer Mother    • Gout Mother    • Asthma Mother    • Cancer Father    • Heart disease Brother    • Diabetes Other    • Cancer Other    • Tuberculosis Other        Social History     Socioeconomic History   • Marital status:      Spouse name: Not on  file   • Number of children: 2   • Years of education: Not on file   • Highest education level: Not on file   Tobacco Use   • Smoking status: Former Smoker     Last attempt to quit: 6/17/2009     Years since quitting: 10.2   • Smokeless tobacco: Never Used   Substance and Sexual Activity   • Alcohol use: Yes     Frequency: Monthly or less     Comment: beer   • Drug use: No   • Sexual activity: No     Prior to Admission medications    Medication Sig Start Date End Date Taking? Authorizing Provider   alendronate (FOSAMAX) 70 MG tablet Take 1 tablet by mouth 1 (One) Time Per Week. 9/18/19   Ciera Garcia MD   amLODIPine (NORVASC) 5 MG tablet Take 1 tablet by mouth Daily. For high blood pressure. 9/21/19   Pam Pete MD   aspirin 81 MG chewable tablet Chew 1 tablet Daily. 6/22/19   Bethany Gipson MD   atorvastatin (LIPITOR) 10 MG tablet Take 20 mg by mouth Every Night.    Leeanna Ratliff MD   B Complex Vitamins (VITAMIN B COMPLEX) capsule capsule Take 1 capsule by mouth Daily.    Leeanna Ratliff MD   budesonide-formoterol (SYMBICORT) 160-4.5 MCG/ACT inhaler Inhale 2 puffs 2 (Two) Times a Day for 30 days. Rinse mouth after using. 9/20/19 10/20/19  Pam Pete MD   Cholecalciferol 94097 units tablet Take 2,000 Units by mouth 2 (Two) Times a Day.    Leeanna Ratliff MD   clopidogrel (PLAVIX) 75 MG tablet Take 75 mg by mouth Daily.    Leeanna Ratliff MD   cyanocobalamin (VITAMIN B-12) 250 MCG tablet Take 250 mcg by mouth Daily.    Leeanna Ratliff MD   ezetimibe (ZETIA) 10 MG tablet Take 10 mg by mouth Daily.    Leeanna Ratliff MD   hydrochlorothiazide (MICROZIDE) 12.5 MG capsule Take 12.5 mg by mouth Daily.    Leeanna Ratliff MD   levETIRAcetam (KEPPRA) 500 MG tablet Take 500 mg by mouth 2 (Two) Times a Day.    Leeanna Ratliff MD   levothyroxine (SYNTHROID, LEVOTHROID) 25 MCG tablet Take 25 mcg by mouth Daily.    Leeanna Ratliff MD   metoprolol  succinate XL (TOPROL-XL) 25 MG 24 hr tablet Take 1 tablet by mouth Daily. 9/21/19   Pam Pete MD   pantoprazole (PROTONIX) 20 MG EC tablet Take 20 mg by mouth Daily.    ProviderLeeanna MD   pregabalin (LYRICA) 100 MG capsule Take 100 mg by mouth 3 (Three) Times a Day.    Leeanna Ratliff MD   sulfaSALAzine (AZULFIDINE) 500 MG tablet Take 1,000 mg by mouth 2 (Two) Times a Day.    ProviderLeeanna MD           Objective   Physical Exam  73-year-old awake alert no acute distress.  HEENT extraocular muscles intact pupils equal and reactive mouth is clear neck supple no adenopathy no lesions signs lungs clear no retractions heart regular without murmur and was soft no tenderness no masses extremities pulses are equal throughout upper and lower extremities no edema cords or Homans sign is DVT.  Patient's awake alert follows commands motor strength normal no focal weakness.  Procedures           ED Course      Results for orders placed or performed during the hospital encounter of 09/23/19   Basic Metabolic Panel   Result Value Ref Range    Glucose 104 (H) 65 - 99 mg/dL    BUN 10 8 - 20 mg/dL    Creatinine 0.80 0.40 - 1.00 mg/dL    Sodium 135 (L) 136 - 144 mmol/L    Potassium 3.4 (L) 3.6 - 5.1 mmol/L    Chloride 101 101 - 111 mmol/L    CO2 23.0 22.0 - 32.0 mmol/L    Calcium 8.6 (L) 8.9 - 10.3 mg/dL    eGFR Non African Amer 71 >60 mL/min/1.73    BUN/Creatinine Ratio 12.5 5.4 - 26.2    Anion Gap 14.4 5.0 - 15.0 mmol/L   Troponin   Result Value Ref Range    Troponin I <0.030 0.000 - 0.030 ng/mL   Magnesium   Result Value Ref Range    Magnesium 1.7 (L) 1.8 - 2.5 mg/dL   CBC Auto Differential   Result Value Ref Range    WBC 6.90 3.40 - 10.80 10*3/mm3    RBC 4.77 3.77 - 5.28 10*6/mm3    Hemoglobin 14.8 12.0 - 15.9 g/dL    Hematocrit 44.2 34.0 - 46.6 %    MCV 92.8 79.0 - 97.0 fL    MCH 31.0 26.6 - 33.0 pg    MCHC 33.4 31.5 - 35.7 g/dL    RDW 13.0 12.3 - 15.4 %    RDW-SD 42.0 37.0 - 54.0 fl    MPV 9.6 6.0 -  12.0 fL    Platelets 179 140 - 450 10*3/mm3    Neutrophil % 52.5 42.7 - 76.0 %    Lymphocyte % 32.9 19.6 - 45.3 %    Monocyte % 11.9 5.0 - 12.0 %    Eosinophil % 2.4 0.3 - 6.2 %    Basophil % 0.3 0.0 - 1.5 %    Neutrophils, Absolute 3.60 1.70 - 7.00 10*3/mm3    Lymphocytes, Absolute 2.30 0.70 - 3.10 10*3/mm3    Monocytes, Absolute 0.80 0.10 - 0.90 10*3/mm3    Eosinophils, Absolute 0.20 0.00 - 0.40 10*3/mm3    Basophils, Absolute 0.00 0.00 - 0.20 10*3/mm3    nRBC 0.1 0.0 - 0.2 /100 WBC   Light Blue Top   Result Value Ref Range    Extra Tube hold for add-on      Xr Chest 1 View    Result Date: 9/23/2019  No acute cardiopulmonary process.  Electronically Signed By-Silvina Boyce On:9/23/2019 6:36 PM This report was finalized on [id number] by  Silvina Boyce, .    Medications   sodium chloride 0.9 % flush 10 mL (not administered)   magnesium sulfate 2g/50 mL (PREMIX) infusion (not administered)       EKG my interpretation normal sinus rhythm rate is 70 ventricular trigeminy evidence of previous inferior infarct QTC of 4 and 69 abnormal EKG and unchanged from previous abnormal            MDM  Number of Diagnoses or Management Options  Chest pain, unspecified type:   Weakness:   Diagnosis management comments: Medical decision making.  Patient had IV established placed on a monitor and had the above exam and evaluation.  Given aspirin p.o. he has no pain currently CBC and electrolytes unremarkable troponin negative magnesium 1.7 she was given 2 g of magnesium IV.  Chest x-ray was negative.  On repeat exam she was resting comfortably no distress.  She is had no bradycardia here she is resting currently she was told to return to the ER for possible pacemaker placement and evaluation by her cardiology office.  She is remained stable looks well see no evidence of acute pulmonary embolism aortic dissection acute stroke or acute cardiac ischemia she will be placed in hospital for further evaluation stable unremarkable ER  course.      Final diagnoses:   Chest pain, unspecified type   Weakness              Rob Dunbar MD  09/23/19 2018

## 2019-09-24 NOTE — H&P
[facility] HOSPITALIST     Keith Alston MD    Patient Care Team:  Keith Alston MD as PCP - General    CHIEF COMPLAINT:     Chief Complaint   Patient presents with   • Chest Pain       Chest pain     HISTORY OF PRESENT ILLNESS:    73-year-old female presents to the ER with chief complaint of water chest pain and erratic heart rate as noted on her automated blood pressure cuff.  The patient reports that her rate was reading 30 to 130 with a labile blood pressure.  The patient reports nausea and shortness of breath with her chest discomfort.  Patient did not have diaphoresis today but reports that yesterday she had an episode of nausea associated with feeling cold and clammy.  The patient has a history of CABG with stents and known stable ventricular ectopy.    Review of records: Cardiac catheterization 6/19/2019 with planned medical treatment, close observation for ventricular dysrhythmia, problems with dizziness and syncope might warrant loop recorder.  Followed by hospital admission and 18 2019 through 9/20/2019 for near syncope, bradycardia with bigeminy.  Report of Dr. Winn following an consideration for permanent pacemaker.          Past Medical History:   Diagnosis Date   • Arthritis    • Atrial fibrillation (CMS/HCC)    • Bronchitis    • Chickenpox    • COPD (chronic obstructive pulmonary disease) (CMS/HCC)    • Cramps of lower extremity     bilateral with weakness   • Heart disease    • Hepatitis C 2013    treatment x 11 months   • High cholesterol    • History of degenerative disc disease    • HLD (hyperlipidemia)    • HTN (hypertension)    • Hypothyroidism    • Measles    • Seizure disorder (CMS/HCC)    • Skin melanoma (CMS/HCC) 2013    s/p lymph node removal on the right   • Sleep apnea    • Stroke (CMS/HCC)    • Whooping cough      Past Surgical History:   Procedure Laterality Date   • APPENDECTOMY     • AXILLARY NODE DISSECTION Right 05/2014   • CARDIAC CATHETERIZATION N/A  6/19/2019    Procedure: Left Heart Cath;  Surgeon: Real Winn MD;  Location: [facility] CATH INVASIVE LOCATION;  Service: Cardiovascular   • CARDIAC CATHETERIZATION N/A 6/19/2019    Procedure: Saphenous Vein Graft;  Surgeon: Real Winn MD;  Location: [facility] CATH INVASIVE LOCATION;  Service: Cardiovascular   • CARDIAC CATHETERIZATION N/A 6/19/2019    Procedure: Left ventriculography;  Surgeon: Real Winn MD;  Location: [facility] CATH INVASIVE LOCATION;  Service: Cardiovascular   • CARDIAC CATHETERIZATION N/A 6/19/2019    Procedure: Coronary angiography;  Surgeon: Real Winn MD;  Location: [facility] CATH INVASIVE LOCATION;  Service: Cardiovascular   • CATARACT EXTRACTION  01/2017    x 2   • CHOLECYSTECTOMY  2004   • COLONOSCOPY  04/05/2017   • CORONARY ANGIOPLASTY WITH STENT PLACEMENT      stent placement after CABG   • CORONARY ARTERY BYPASS GRAFT  01/20/2010   • CORONARY ARTERY BYPASS GRAFT     • FEMORAL POPLITEAL BYPASS Right 07/20/2017    Dr. Palmer   • INCISIONAL HERNIA REPAIR  09/28/2016    lap. Dr. West   • MOLE REMOVAL Right     arm   • OTHER SURGICAL HISTORY      laser surgery X 2   • PARTIAL HYSTERECTOMY     • THYROIDECTOMY, PARTIAL      goiter removed 1/2 thyroid     Family History   Problem Relation Age of Onset   • Hypertension Mother    • Cancer Mother    • Gout Mother    • Asthma Mother    • Cancer Father    • Heart disease Brother    • Diabetes Other    • Cancer Other    • Tuberculosis Other      Social History     Tobacco Use   • Smoking status: Former Smoker     Last attempt to quit: 6/17/2009     Years since quitting: 10.2   • Smokeless tobacco: Never Used   Substance Use Topics   • Alcohol use: No     Frequency: Monthly or less     Comment: beer   • Drug use: No     Medications Prior to Admission   Medication Sig Dispense Refill Last Dose   • alendronate (FOSAMAX) 70 MG tablet Take 1 tablet by mouth 1 (One) Time Per Week. 4 tablet 3    • amLODIPine (NORVASC) 5 MG tablet Take 1  tablet by mouth Daily. For high blood pressure. 30 tablet 0    • aspirin 81 MG chewable tablet Chew 1 tablet Daily. 30 tablet 0 9/18/2019 at Unknown time   • atorvastatin (LIPITOR) 10 MG tablet Take 20 mg by mouth Every Night.   9/17/2019 at Unknown time   • B Complex Vitamins (VITAMIN B COMPLEX) capsule capsule Take 1 capsule by mouth Daily.   9/18/2019 at Unknown time   • budesonide-formoterol (SYMBICORT) 160-4.5 MCG/ACT inhaler Inhale 2 puffs 2 (Two) Times a Day for 30 days. Rinse mouth after using. 1 inhaler 0    • Cholecalciferol 29224 units tablet Take 2,000 Units by mouth 2 (Two) Times a Day.   9/18/2019 at Unknown time   • clopidogrel (PLAVIX) 75 MG tablet Take 75 mg by mouth Daily.   9/18/2019 at Unknown time   • cyanocobalamin (VITAMIN B-12) 250 MCG tablet Take 250 mcg by mouth Daily.   9/18/2019 at Unknown time   • ezetimibe (ZETIA) 10 MG tablet Take 10 mg by mouth Daily.   9/18/2019 at Unknown time   • hydrochlorothiazide (MICROZIDE) 12.5 MG capsule Take 12.5 mg by mouth Daily.   9/18/2019 at Unknown time   • levETIRAcetam (KEPPRA) 500 MG tablet Take 500 mg by mouth 2 (Two) Times a Day.   9/18/2019 at Unknown time   • levothyroxine (SYNTHROID, LEVOTHROID) 25 MCG tablet Take 25 mcg by mouth Daily.   9/18/2019 at Unknown time   • metoprolol succinate XL (TOPROL-XL) 25 MG 24 hr tablet Take 1 tablet by mouth Daily. 30 tablet 0    • pantoprazole (PROTONIX) 20 MG EC tablet Take 20 mg by mouth Daily.   9/18/2019 at Unknown time   • pregabalin (LYRICA) 100 MG capsule Take 100 mg by mouth 3 (Three) Times a Day.   9/18/2019 at Unknown time   • sulfaSALAzine (AZULFIDINE) 500 MG tablet Take 1,000 mg by mouth 2 (Two) Times a Day.   9/18/2019 at Unknown time     Allergies:  Penicillamine and Penicillins      There is no immunization history on file for this patient.        REVIEW OF SYSTEMS:     Review of Systems   Constitution: Positive for chills and diaphoresis. Negative for fever.   HENT: Negative.    Eyes:  "Negative.    Cardiovascular: Positive for chest pain, irregular heartbeat and near-syncope. Negative for leg swelling and syncope.   Respiratory: Positive for shortness of breath. Negative for cough and sputum production.    Endocrine: Positive for cold intolerance.   Skin: Negative.    Musculoskeletal: Negative.    Gastrointestinal: Positive for nausea. Negative for abdominal pain, hematochezia, melena and vomiting.   Genitourinary: Negative.    Neurological: Negative.    Psychiatric/Behavioral: Negative.    All other systems reviewed and are negative.      Vital Signs  Temp:  [97.4 °F (36.3 °C)-97.5 °F (36.4 °C)] 97.5 °F (36.4 °C)  Heart Rate:  [46-74] 70  Resp:  [14-20] 19  BP: (133-163)/(61-97) 152/79    Flowsheet Rows      First Filed Value   Admission Height  157.5 cm (62\") Documented at 09/23/2019 1713   Admission Weight  77.1 kg (169 lb 15.6 oz) Documented at 09/23/2019 1713           Physical Exam:    Physical Exam   Constitutional: She is oriented to person, place, and time. She appears well-developed and well-nourished.   HENT:   Head: Normocephalic and atraumatic.   Eyes: EOM are normal. Pupils are equal, round, and reactive to light. No scleral icterus.   Neck: Normal range of motion. Neck supple. No JVD present. No tracheal deviation present.   Cardiovascular: Regular rhythm, normal heart sounds and intact distal pulses.   Pulmonary/Chest: Effort normal and breath sounds normal. No respiratory distress.   Abdominal: Soft. Bowel sounds are normal.   Musculoskeletal: Normal range of motion. She exhibits no edema or tenderness.   Neurological: She is alert and oriented to person, place, and time.   Skin: Skin is warm and dry. Capillary refill takes less than 2 seconds.   Psychiatric: She has a normal mood and affect. Her behavior is normal. Judgment and thought content normal.   Nursing note and vitals reviewed.      Emotional Behavior:    Cooperative    Debilities: none   Age appropriate      Results " Review:    I reviewed the patient's new clinical results.  Lab Results (most recent)     Procedure Component Value Units Date/Time    Extra Tubes [[id number]] Collected:  09/23/19 1748    Specimen:  Blood, Venous Line Updated:  09/23/19 1900    Narrative:       The following orders were created for panel order Extra Tubes.  Procedure                               Abnormality         Status                     ---------                               -----------         ------                     Light Blue Top[[id number]]                                   Final result                 Please view results for these tests on the individual orders.    Light Blue Top [[id number]] Collected:  09/23/19 1748    Specimen:  Blood Updated:  09/23/19 1900     Extra Tube hold for add-on     Comment: Auto resulted       Basic Metabolic Panel [[id number]]  (Abnormal) Collected:  09/23/19 1748    Specimen:  Blood from Hand, Left Updated:  09/23/19 1846     Glucose 104 mg/dL      BUN 10 mg/dL      Creatinine 0.80 mg/dL      Sodium 135 mmol/L      Potassium 3.4 mmol/L      Chloride 101 mmol/L      CO2 23.0 mmol/L      Calcium 8.6 mg/dL      eGFR Non African Amer 71 mL/min/1.73      BUN/Creatinine Ratio 12.5     Anion Gap 14.4 mmol/L     Narrative:       The MDRD GFR formula is only valid for adults with stable renal function between ages 18 and 70.    Magnesium [[id number]]  (Abnormal) Collected:  09/23/19 1748    Specimen:  Blood from Hand, Left Updated:  09/23/19 1846     Magnesium 1.7 mg/dL     Troponin [[id number]]  (Normal) Collected:  09/23/19 1748    Specimen:  Blood from Hand, Left Updated:  09/23/19 1827     Troponin I <0.030 ng/mL     Narrative:       Troponin I Reference Range:    0.00-0.03  Negative.  Repeat testing in 4-6 hours if clinically indicated.    0.04-0.29  Suspicious for myocardial injury. Serial measurements and clinical  correlation may be necessary to confirm or exclude diagnosis of acute  coronary syndrome.   Repeat testing in 4-6 hours if indicated.     >0.29 Consistent with myocardial injury.  Recommend clinical and laboratory correlation.     Results my be falsely decreased if patient taking Biotin.     CBC & Differential [[id number]] Collected:  09/23/19 1748    Specimen:  Blood Updated:  09/23/19 1804    Narrative:       The following orders were created for panel order CBC & Differential.  Procedure                               Abnormality         Status                     ---------                               -----------         ------                     CBC Auto Differential[[id number]]        Normal              Final result                 Please view results for these tests on the individual orders.    CBC Auto Differential [[id number]]  (Normal) Collected:  09/23/19 1748    Specimen:  Blood from Hand, Left Updated:  09/23/19 1804     WBC 6.90 10*3/mm3      RBC 4.77 10*6/mm3      Hemoglobin 14.8 g/dL      Hematocrit 44.2 %      MCV 92.8 fL      MCH 31.0 pg      MCHC 33.4 g/dL      RDW 13.0 %      RDW-SD 42.0 fl      MPV 9.6 fL      Platelets 179 10*3/mm3      Neutrophil % 52.5 %      Lymphocyte % 32.9 %      Monocyte % 11.9 %      Eosinophil % 2.4 %      Basophil % 0.3 %      Neutrophils, Absolute 3.60 10*3/mm3      Lymphocytes, Absolute 2.30 10*3/mm3      Monocytes, Absolute 0.80 10*3/mm3      Eosinophils, Absolute 0.20 10*3/mm3      Basophils, Absolute 0.00 10*3/mm3      nRBC 0.1 /100 WBC           Imaging Results (most recent)     Procedure Component Value Units Date/Time    XR Chest 1 View [[id number]] Collected:  09/23/19 1835     Updated:  09/23/19 1838    Narrative:       Examination: XR CHEST 1 VW-     Date of Exam: 9/23/2019 6:25 PM     Indication: Chest pain.     Comparison: 6/19/2019     Technique: 1 view of the chest      Findings:  There are no airspace consolidations. No pleural effusions. No  pneumothorax. The heart size is normal. The pulmonary vasculature  appears within normal limits. Stable  granulomatous calcifications No  acute osseous abnormality identified.       Impression:       No acute cardiopulmonary process.     Electronically Signed By-Silvina Boyce On:2019 6:36 PM  This report was finalized on [id number] by  Silvina Boyce, .        reviewed    ECG/EMG Results (most recent)     Procedure Component Value Units Date/Time    ECG 12 Lead [[id number]] Collected:  19     Updated:  19    Narrative:       HEART RATE= 70  bpm  RR Interval= 852  ms  WV Interval= 178  ms  P Horizontal Axis= 0  deg  P Front Axis= 74  deg  QRSD Interval= 96  ms  QT Interval= 433  ms  QRS Axis= 4  deg  T Wave Axis= 50  deg  - ABNORMAL ECG -  Sinus rhythm  Ventricular trigeminy  Inferior infarct, old  Electronically Signed By:   Date and Time of Study: 2019 17:27:22        reviewed    Results for orders placed during the hospital encounter of 10/24/18   Duplex Lower Extremity Art / Grafts - Bilateral CAR    Narrative                  Lower Extremity Arterial Report                          [facility]                         Vascular Laboratory                            [address] [place] [address]    Name: HERNESTO CARNES                Study Date: 10/24/2018 11:14 AM  MRN: [id number]                       Patient Location:   : 1945 (M/d/yyyy)           Gender: Female  Age: 72 yrs                          Account#: [id number]  Reason For Study: right fem-pop graft surveillance      Procedure  Patient has a right femoral-popliteal bypass graft. Technically satisfactory  study.    Ankle-Brachial Index (GERSON)  Normal segmental pressures at the ankle and toe levels of both lower  extremities.  GERSON and toe index are normal on both sides.  PVR findings show good amplitude of the waveform.  Doppler waveforms are triphasic on the bilaterally.    Rt Arterial Graft Scan  The native inflow velocity is 76.  The native outflow velocity is 59.  The proximal  anastomosis velocity is 60.  The proximal graft velocity is 76.  The mid graft velocity is 51.  The distal graft velocity is 53.  The distal anastomosis velocity is 41.      Interpretation Summary  Normal ankle brachial indices bilaterally.  _______________________________________________________________________________    Electronically signed by: Domingo Palmer MD  on 10/28/2018 02:29 PM    Ordering Physician: DEVEN BORDEN  Referring Physician: LUCILLE WILLINGHAM  Performed By: LEFTY         Results for orders placed during the hospital encounter of 06/17/19   Adult Transthoracic Echo Complete W/ Cont if Necessary Per Protocol    Narrative Technically satisfactory study.  Mitral valve is thickened with adequate opening motion.  Aortic valve is normal.  Tricuspid valve is normal.  Left atrium is normal in size.  Left ventricle is normal in size and contractility with ejection fraction   of 60%.  Atrial septum is intact.  No pericardial effusion or intracardiac thrombus is seen.  The right atrium and right ventricle are normal.  Aorta is normal.    Impression  Thickened mitral valve with adequate opening motion.  Normal left ventricle size and contractility with ejection fraction of   60%.       XR Chest 1 View  Narrative: Examination: XR CHEST 1 VW-     Date of Exam: 9/23/2019 6:25 PM     Indication: Chest pain.     Comparison: 6/19/2019     Technique: 1 view of the chest      Findings:  There are no airspace consolidations. No pleural effusions. No  pneumothorax. The heart size is normal. The pulmonary vasculature  appears within normal limits. Stable granulomatous calcifications No  acute osseous abnormality identified.     Impression: No acute cardiopulmonary process.     Electronically Signed By-Silvina Boyce On:9/23/2019 6:36 PM  This report was finalized on [id number] by  Silvina Boyce, .      Assessment/Plan       Chest pain      Plan    Chest pain, certain etiology--cause needs to be evaluated: Serial  troponin; cardiology consult     Bradycardia, reported by patient: Hold metoprolol; continuous heart monitor    --History of stable ventricular ectopy, PVCs    CAD, chronic with history of CABG with stents: Continue Plavix, aspirin    Hypoalemia, mild, potassium 3.4: Potassium replacement protocol    Hypo-magnesium, magnesium 1.7: Magnesium replacement protocol    Osteoporosis: Hold Fosamax    Hypertension, chronic: Continue amlodipine; hold hydrochlorothiazide    HLD, chronic: Continue atorvastatin, hold Zetia    Dietary supplementation: Continue vitamin B12, vitamin D    COPD, chronic: Continue Symbicort    --Former smoker quit 9 years ago; a year pack history    Seizure disorder: Continue Keppra    Both thyroidism, chronic: Check TSH, continue levothyroxine    GERD, chronic: Continue Protonix    RA, chronic: Continue sulfasalazine, Lyrica    DVT prophylaxis--Lovenox    Disposition    Chest pain rule out ACS will likely accomplish in 23-hour observation timeframe    I discussed the patients findings and my recommendations with patient.     JOSSE Cano  09/23/19  10:37 PM

## 2019-09-24 NOTE — PROGRESS NOTES
Discharge Planning Assessment   Ziggy     Patient Name: Delphine Rob  MRN: [id number]  Today's Date: 9/24/2019    Admit Date: 9/23/2019    Discharge Needs Assessment     Row Name 09/24/19 1238       Living Environment    Lives With  child(susi), adult Daughter    Current Living Arrangements  home/apartment/condo    Primary Care Provided by  self    Family Caregiver if Needed  child(susi), adult    Quality of Family Relationships  supportive    Able to Return to Prior Arrangements  yes       Resource/Environmental Concerns    Resource/Environmental Concerns  none    Transportation Concerns  car, none       Transition Planning    Patient/Family Anticipates Transition to  home with family    Patient/Family Anticipated Services at Transition  none    Transportation Anticipated  family or friend will provide       Discharge Needs Assessment    Concerns to be Addressed  no discharge needs identified;denies needs/concerns at this time    Equipment Currently Used at Home  walker, standard;oxygen;bipap/cpap    Anticipated Changes Related to Illness  none    Equipment Needed After Discharge  none    Discharge Coordination/Progress  Patient states no trouble affording medicatons, PCP Keith Alston's nurse practitioner with [facility] calls that comes to her home.         Discharge Plan     Row Name 09/24/19 1241       Plan    Plan  Anticipate routine home, denies needs at this time.    Patient/Family in Agreement with Plan  yes    Plan Comments  Patient asked about a sterilizer for her cpap that is old because it is difficult to clean with soap and water. She was not sure what company her cpap was through. Asked Kal from [employer] and he said the R17 cpap sterilizer machine is $349.95 private pay only. Notified patient of cost and she declined offer and said she is currently trying to save enough money for her bottom teeth and cannot afford to have the  right now. Dr. Winn following.         Ashley Gabriel  JAY Gabriel       Office Phone: [phone]  Office Cell: [phone]

## 2019-09-25 PROBLEM — R07.9 CHEST PAIN: Status: RESOLVED | Noted: 2019-01-01 | Resolved: 2019-01-01

## 2019-09-25 NOTE — PROGRESS NOTES
Referring Provider: Remy Dias DO    Reason for follow-up:  Status post CABG  Premature ventricular contractions  Chest pain  Bradycardia     Patient Care Team:  Keith Alston MD as PCP - General    Subjective .      ROS    Since I have last seen her yesterday, the patient has been without any chest discomfort ,shortness of breath, palpitations, dizziness or syncope.  Denies having any headache ,abdominal pain ,nausea, vomiting , diarrhea constipation, loss of weight or loss of appetite.  Denies having any excessive bruising ,hematuria or blood in the stool.    Review of all systems negative except as indicated    History  Past Medical History:   Diagnosis Date   • Arthritis    • Atrial fibrillation (CMS/HCC)    • Bronchitis    • Chickenpox    • COPD (chronic obstructive pulmonary disease) (CMS/HCC)    • Cramps of lower extremity     bilateral with weakness   • Heart disease    • Hepatitis C 2013    treatment x 11 months   • High cholesterol    • History of degenerative disc disease    • HLD (hyperlipidemia)    • HTN (hypertension)    • Hypothyroidism    • Measles    • Seizure disorder (CMS/HCC)    • Skin melanoma (CMS/HCC) 2013    s/p lymph node removal on the right   • Sleep apnea    • Stroke (CMS/HCC)    • Whooping cough        Past Surgical History:   Procedure Laterality Date   • APPENDECTOMY     • AXILLARY NODE DISSECTION Right 05/2014   • CARDIAC CATHETERIZATION N/A 6/19/2019    Procedure: Left Heart Cath;  Surgeon: Real Winn MD;  Location: [facility] CATH INVASIVE LOCATION;  Service: Cardiovascular   • CARDIAC CATHETERIZATION N/A 6/19/2019    Procedure: Saphenous Vein Graft;  Surgeon: Real Winn MD;  Location: [facility] CATH INVASIVE LOCATION;  Service: Cardiovascular   • CARDIAC CATHETERIZATION N/A 6/19/2019    Procedure: Left ventriculography;  Surgeon: Real Winn MD;  Location: [facility] CATH INVASIVE LOCATION;  Service: Cardiovascular   • CARDIAC CATHETERIZATION N/A 6/19/2019     Procedure: Coronary angiography;  Surgeon: Real Winn MD;  Location: [facility] INVASIVE LOCATION;  Service: Cardiovascular   • CATARACT EXTRACTION  01/2017    x 2   • CHOLECYSTECTOMY  2004   • COLONOSCOPY  04/05/2017   • CORONARY ANGIOPLASTY WITH STENT PLACEMENT      stent placement after CABG   • CORONARY ARTERY BYPASS GRAFT  01/20/2010   • CORONARY ARTERY BYPASS GRAFT     • FEMORAL POPLITEAL BYPASS Right 07/20/2017    Dr. Palmer   • INCISIONAL HERNIA REPAIR  09/28/2016    lap. Dr. West   • MOLE REMOVAL Right     arm   • OTHER SURGICAL HISTORY      laser surgery X 2   • PARTIAL HYSTERECTOMY     • THYROIDECTOMY, PARTIAL      goiter removed 1/2 thyroid       Family History   Problem Relation Age of Onset   • Hypertension Mother    • Cancer Mother    • Gout Mother    • Asthma Mother    • Cancer Father    • Heart disease Brother    • Diabetes Other    • Cancer Other    • Tuberculosis Other        Social History     Tobacco Use   • Smoking status: Former Smoker     Last attempt to quit: 6/17/2009     Years since quitting: 10.2   • Smokeless tobacco: Never Used   Substance Use Topics   • Alcohol use: No     Frequency: Monthly or less     Comment: beer   • Drug use: No        Medications Prior to Admission   Medication Sig Dispense Refill Last Dose   • alendronate (FOSAMAX) 70 MG tablet Take 1 tablet by mouth 1 (One) Time Per Week. 4 tablet 3    • amLODIPine (NORVASC) 5 MG tablet Take 1 tablet by mouth Daily. For high blood pressure. 30 tablet 0    • aspirin 81 MG chewable tablet Chew 1 tablet Daily. 30 tablet 0 9/18/2019 at Unknown time   • atorvastatin (LIPITOR) 10 MG tablet Take 20 mg by mouth Every Night.   9/17/2019 at Unknown time   • B Complex Vitamins (VITAMIN B COMPLEX) capsule capsule Take 1 capsule by mouth Daily.   9/18/2019 at Unknown time   • budesonide-formoterol (SYMBICORT) 160-4.5 MCG/ACT inhaler Inhale 2 puffs 2 (Two) Times a Day for 30 days. Rinse mouth after using. 1 inhaler 0    •  Cholecalciferol 62047 units tablet Take 2,000 Units by mouth 2 (Two) Times a Day.   9/18/2019 at Unknown time   • clopidogrel (PLAVIX) 75 MG tablet Take 75 mg by mouth Daily.   9/18/2019 at Unknown time   • cyanocobalamin (VITAMIN B-12) 250 MCG tablet Take 250 mcg by mouth Daily.   9/18/2019 at Unknown time   • ezetimibe (ZETIA) 10 MG tablet Take 10 mg by mouth Daily.   9/18/2019 at Unknown time   • hydrochlorothiazide (MICROZIDE) 12.5 MG capsule Take 12.5 mg by mouth Daily.   9/18/2019 at Unknown time   • levETIRAcetam (KEPPRA) 500 MG tablet Take 500 mg by mouth 2 (Two) Times a Day.   9/18/2019 at Unknown time   • levothyroxine (SYNTHROID, LEVOTHROID) 25 MCG tablet Take 25 mcg by mouth Daily.   9/18/2019 at Unknown time   • metoprolol succinate XL (TOPROL-XL) 25 MG 24 hr tablet Take 1 tablet by mouth Daily. 30 tablet 0    • pantoprazole (PROTONIX) 20 MG EC tablet Take 20 mg by mouth Daily.   9/18/2019 at Unknown time   • pregabalin (LYRICA) 100 MG capsule Take 100 mg by mouth 3 (Three) Times a Day.   9/18/2019 at Unknown time   • sulfaSALAzine (AZULFIDINE) 500 MG tablet Take 1,000 mg by mouth 2 (Two) Times a Day.   9/18/2019 at Unknown time       Allergies  Penicillamine and Penicillins    Scheduled Meds:  amLODIPine 5 mg Oral Q24H   aspirin 81 mg Oral Daily   atorvastatin 20 mg Oral Nightly   budesonide-formoterol 2 puff Inhalation BID - RT   Cholecalciferol 400 Units Oral Daily   clopidogrel 75 mg Oral Daily   enoxaparin 40 mg Subcutaneous Q24H   levETIRAcetam 500 mg Oral Q12H   levothyroxine 25 mcg Oral Q AM   pantoprazole 40 mg Oral Q AM   pregabalin 100 mg Oral TID   sodium chloride 10 mL Intravenous Q12H   sulfaSALAzine 1,000 mg Oral BID   cyanocobalamin 250 mcg Oral Daily     Continuous Infusions:   PRN Meds:.•  acetaminophen **OR** acetaminophen **OR** acetaminophen  •  magnesium sulfate **OR** magnesium sulfate **OR** magnesium sulfate  •  melatonin  •  ondansetron  •  potassium chloride  •  potassium  "chloride  •  [COMPLETED] Insert peripheral IV **AND** sodium chloride  •  sodium chloride    Objective     VITAL SIGNS  Vitals:    09/24/19 1820 09/24/19 1933 09/24/19 1935 09/24/19 2300   BP: 157/67   135/69   BP Location: Left arm   Left arm   Patient Position: Lying   Lying   Pulse: 53 89 88    Resp: 18 18 18 12   Temp:    97.9 °F (36.6 °C)   TempSrc:    Oral   SpO2: 98% 98% 97% 94%   Weight:       Height:           Flowsheet Rows      First Filed Value   Admission Height  157.5 cm (62\") Documented at 09/23/2019 1713   Admission Weight  77.1 kg (169 lb 15.6 oz) Documented at 09/23/2019 1713           TELEMETRY: Sinus rhythm premature ventricular contractions    Physical Exam:  The patient is alert, oriented and in no distress.  Vital signs as noted above.  Head and neck revealed no carotid bruits or jugular venous distention.  No thyromegaly or lymphadenopathy is present  Lungs clear.  No wheezing.  Breath sounds are normal bilaterally.  Heart normal first and second heart sounds.  No murmur. No precordial rub is present.  No gallop is present.  Abdomen soft and nontender.  No organomegaly is present.  Extremities with good peripheral pulses without any pedal edema.  Skin warm and dry.  Musculoskeletal system is grossly normal  CNS grossly normal      Results Review:   I reviewed the patient's new clinical results.  Lab Results (last 24 hours)     Procedure Component Value Units Date/Time    Basic Metabolic Panel [[id number]]  (Abnormal) Collected:  09/25/19 0301    Specimen:  Blood Updated:  09/25/19 0434     Glucose 111 mg/dL      BUN 9 mg/dL      Creatinine 1.00 mg/dL      Sodium 144 mmol/L      Potassium 4.5 mmol/L      Chloride 108 mmol/L      CO2 28.0 mmol/L      Calcium 9.0 mg/dL      eGFR Non African Amer 55 mL/min/1.73      BUN/Creatinine Ratio 9.0     Anion Gap 12.5 mmol/L     Narrative:       The MDRD GFR formula is only valid for adults with stable renal function between ages 18 and 70.    Magnesium " [[id number]]  (Normal) Collected:  09/25/19 0301    Specimen:  Blood Updated:  09/25/19 0434     Magnesium 2.0 mg/dL     TSH [[id number]]  (Normal) Collected:  09/25/19 0301    Specimen:  Blood Updated:  09/25/19 0434     TSH 3.460 uIU/mL      Comment: Results may be falsely decreased if patient taking Biotin.       Troponin [[id number]]  (Normal) Collected:  09/25/19 0301    Specimen:  Blood Updated:  09/25/19 0425     Troponin I <0.030 ng/mL     Narrative:       Troponin I Reference Range:    0.00-0.03  Negative.  Repeat testing in 4-6 hours if clinically indicated.    0.04-0.29  Suspicious for myocardial injury. Serial measurements and clinical  correlation may be necessary to confirm or exclude diagnosis of acute  coronary syndrome.  Repeat testing in 4-6 hours if indicated.     >0.29 Consistent with myocardial injury.  Recommend clinical and laboratory correlation.     Results my be falsely decreased if patient taking Biotin.     Troponin [[id number]]  (Normal) Collected:  09/24/19 1115    Specimen:  Blood Updated:  09/24/19 1216     Troponin I <0.030 ng/mL     Narrative:       Troponin I Reference Range:    0.00-0.03  Negative.  Repeat testing in 4-6 hours if clinically indicated.    0.04-0.29  Suspicious for myocardial injury. Serial measurements and clinical  correlation may be necessary to confirm or exclude diagnosis of acute  coronary syndrome.  Repeat testing in 4-6 hours if indicated.     >0.29 Consistent with myocardial injury.  Recommend clinical and laboratory correlation.     Results my be falsely decreased if patient taking Biotin.     POC Glucose Once [[id number]]  (Abnormal) Collected:  09/24/19 1143    Specimen:  Blood Updated:  09/24/19 1151     Glucose 128 mg/dL      Comment: Serial Number: [id number]:  [id number]       POC Glucose Once [[id number]]  (Normal) Collected:  09/24/19 0748    Specimen:  Blood Updated:  09/24/19 0749     Glucose 94 mg/dL      Comment: Serial Number:  [id number]:  [id number]       Basic Metabolic Panel [[id number]]  (Abnormal) Collected:  09/24/19 0555    Specimen:  Blood Updated:  09/24/19 0720     Glucose 99 mg/dL      BUN 10 mg/dL      Creatinine 0.90 mg/dL      Sodium 141 mmol/L      Potassium 4.4 mmol/L      Comment: Specimen hemolyzed.  Results may be affected.        Chloride 108 mmol/L      CO2 24.0 mmol/L      Calcium 8.4 mg/dL      eGFR Non African Amer 62 mL/min/1.73      BUN/Creatinine Ratio 11.1     Anion Gap 13.4 mmol/L     Narrative:       The MDRD GFR formula is only valid for adults with stable renal function between ages 18 and 70.    Magnesium [[id number]]  (Normal) Collected:  09/24/19 0555    Specimen:  Blood Updated:  09/24/19 0720     Magnesium 2.2 mg/dL      Comment: Specimen hemolyzed.  Results may be affected.       TSH [[id number]]  (Abnormal) Collected:  09/24/19 0555    Specimen:  Blood Updated:  09/24/19 0719     TSH 5.760 uIU/mL      Comment: Results may be falsely decreased if patient taking Biotin.       Troponin [[id number]]  (Normal) Collected:  09/24/19 0555    Specimen:  Blood Updated:  09/24/19 0707     Troponin I <0.030 ng/mL     Narrative:       Troponin I Reference Range:    0.00-0.03  Negative.  Repeat testing in 4-6 hours if clinically indicated.    0.04-0.29  Suspicious for myocardial injury. Serial measurements and clinical  correlation may be necessary to confirm or exclude diagnosis of acute  coronary syndrome.  Repeat testing in 4-6 hours if indicated.     >0.29 Consistent with myocardial injury.  Recommend clinical and laboratory correlation.     Results my be falsely decreased if patient taking Biotin.     CBC Auto Differential [[id number]]  (Abnormal) Collected:  09/24/19 0555    Specimen:  Blood Updated:  09/24/19 0644     WBC 5.60 10*3/mm3      RBC 4.93 10*6/mm3      Hemoglobin 15.3 g/dL      Hematocrit 45.6 %      MCV 92.5 fL      MCH 30.9 pg      MCHC 33.4 g/dL      RDW 12.9 %      RDW-SD 41.6 fl      MPV  9.7 fL      Platelets 169 10*3/mm3      Neutrophil % 49.5 %      Lymphocyte % 34.5 %      Monocyte % 12.5 %      Eosinophil % 2.7 %      Basophil % 0.8 %      Neutrophils, Absolute 2.80 10*3/mm3      Lymphocytes, Absolute 1.90 10*3/mm3      Monocytes, Absolute 0.70 10*3/mm3      Eosinophils, Absolute 0.20 10*3/mm3      Basophils, Absolute 0.00 10*3/mm3      nRBC 0.0 /100 WBC           Imaging Results (last 24 hours)     ** No results found for the last 24 hours. **      LAB RESULTS (LAST 7 DAYS)    CBC  Results from last 7 days   Lab Units 09/24/19  0555 09/23/19 1748 09/19/19 0640 09/18/19  1659   WBC 10*3/mm3 5.60 6.90 4.50 6.30   RBC 10*6/mm3 4.93 4.77 4.69 4.68   HEMOGLOBIN g/dL 15.3 14.8 14.6 14.7   HEMATOCRIT % 45.6 44.2 43.7 42.9   MCV fL 92.5 92.8 93.2 91.6   PLATELETS 10*3/mm3 169 179 168 191       BMP  Results from last 7 days   Lab Units 09/25/19  0301 09/24/19  0555 09/23/19 1748 09/20/19  0525 09/19/19  0640 09/18/19  1659   SODIUM mmol/L 144 141 135* 141 141 136   POTASSIUM mmol/L 4.5 4.4 3.4* 4.0 4.5 3.5*   CHLORIDE mmol/L 108 108 101 107 104 104   CO2 mmol/L 28.0 24.0 23.0 25.0 29.0 23.0   BUN mg/dL 9 10 10 9 10 10   CREATININE mg/dL 1.00 0.90 0.80 0.80 1.00 0.90   GLUCOSE mg/dL 111* 99 104* 106* 103* 97   MAGNESIUM mg/dL 2.0 2.2 1.7* 1.9 1.9  --    PHOSPHORUS mg/dL  --   --   --   --  3.5  --        CMP Results from last 7 days   Lab Units 09/25/19  0301 09/24/19  0555 09/23/19 1748 09/20/19  0525 09/19/19  0640 09/18/19  1659   SODIUM mmol/L 144 141 135* 141 141 136   POTASSIUM mmol/L 4.5 4.4 3.4* 4.0 4.5 3.5*   CHLORIDE mmol/L 108 108 101 107 104 104   CO2 mmol/L 28.0 24.0 23.0 25.0 29.0 23.0   BUN mg/dL 9 10 10 9 10 10   CREATININE mg/dL 1.00 0.90 0.80 0.80 1.00 0.90   GLUCOSE mg/dL 111* 99 104* 106* 103* 97   ALBUMIN g/dL  --   --   --   --  3.40* 3.70   BILIRUBIN mg/dL  --   --   --   --  0.9 0.7   ALK PHOS U/L  --   --   --   --  81 93*   AST (SGOT) U/L  --   --   --   --  21 21   ALT  (SGPT) U/L  --   --   --   --  16 15         BNP        TROPONIN  Results from last 7 days   Lab Units 09/25/19  0301   TROPONIN I ng/mL <0.030       CoAg  Results from last 7 days   Lab Units 09/18/19  1659   INR  1.03   APTT seconds 25.6       Creatinine Clearance  Estimated Creatinine Clearance: 48.1 mL/min (by C-G formula based on SCr of 1 mg/dL).    ABG        Radiology  Xr Chest 1 View    Result Date: 9/23/2019  No acute cardiopulmonary process.  Electronically Signed By-Silvina Boyce On:9/23/2019 6:36 PM This report was finalized on [id number] by  Silvina Boyce, .              EKG      I personally viewed and interpreted the patient's EKG/Telemetry data:    ECHOCARDIOGRAM:    Results for orders placed during the hospital encounter of 06/17/19   Adult Transthoracic Echo Complete W/ Cont if Necessary Per Protocol    Narrative Technically satisfactory study.  Mitral valve is thickened with adequate opening motion.  Aortic valve is normal.  Tricuspid valve is normal.  Left atrium is normal in size.  Left ventricle is normal in size and contractility with ejection fraction   of 60%.  Atrial septum is intact.  No pericardial effusion or intracardiac thrombus is seen.  The right atrium and right ventricle are normal.  Aorta is normal.    Impression  Thickened mitral valve with adequate opening motion.  Normal left ventricle size and contractility with ejection fraction of   60%.           STRESS MYOVIEW:    Cardiolite (Tc-99m Sestamibi) stress test    CARDIAC CATHETERIZATION:            OTHER:         Assessment/Plan     Active Problems:    Chest pain          /////////////////  impression  ---------------------------  Patient has been having multiple episodes of spells starting with nausea with associated dizziness near syncope and chest discomfort.  Patient is having rapid heart rhythm intermittently.  Dizziness and near syncope.  Patient has sinus bradycardia which could be multifactorial including secondary to  beta-blocker effect.  Patient has history of intermittent atrial fibrillation.  Tachy bradycardia syndrome.     -Status post CABG 01/20/2010 at [facility].  Status post stent placement after CABG ( details not available).     Cardiac catheterization and coronary angiography 6/19/2019 revealed  Left ventricle size and contractility is normal with proximal inferior wall hypokinesis.  Ejection fraction is 55%.  Left main artery is normal.  Left anterior descending artery is noted in the midsegment with filling of the LAD through LIMA.  Circumflex coronary artery is normal  Right coronary artery is a very small caliber vessel with 99% disease in the midsegment with filling of the distal vessel through collaterals.     RAUSCH to LAD is patent  SVG to marginal branch is patent  SVG to RCA is totally occluded.        Echocardiogram 17 2019 revealed mild mitral regurgitation and normal left ventricular function.    Carri scan Cardiolite test is negative for myocardial ischemia 11/13/2017     -Intermittent atrial fibrillation.  Patient is maintaining sinus rhythm.       -History of  stroke from which patient has recovered.      -Hypertension dyslipidemia and hypothyroidism      -Status post thyroidectomy for cyst cholecystectomy and hysterectomy.      -History of hepatitis-C      -Allergy to penicillin      -Former smoker quit smoking October 2013.      -Status post  Malignant melanoma removal from right elbow area.  Patient did not need chemotherapy     -history of peripheral vascular disease.  CTA 06/30/2017 revealed right superficial femoral artery occlusion  ---------------.  Plan  ----------------------  Patient has spells concern for tachybradycardia syndrome.  Patient has significant bradycardia with heart rates in the 30/min and also having palpitations.  I will some of them are related to immature ventricular contractions.-Pseudo-bradycardia  Hypokalemia-improved  Hypomagnesemia-improved  Patient is having  symptoms of dizziness and near syncope.  Metoprolol is on hold at this time.  Options were discussed with patient.  Patient to have loop recorder placement  Risks and benefits pros and cons of the procedure were discussed with patient.  If patient does not have true bradycardia consideration may be given for ablation of PVCs.  Permanent pacemaker implantation if patient has symptomatic persistent bradycardia documented on the loop recorder.  Medications were reviewed and updated.  Close cardiac monitoring.  Further plan will depend on patient's progress.  ]]]]]]]]]]]]]]]]            Real Winn MD  09/25/19  6:38 AM

## 2019-09-25 NOTE — PROGRESS NOTES
Continued Stay Note  STEVE Trinh     Patient Name: Delphine Rob  MRN: [id number]  Today's Date: 9/25/2019    Admit Date: 9/23/2019    Discharge Plan     Row Name 09/25/19 1339       Plan    Plan Comments  DC Barriers: Scheduled to have loop recorder placement.        Ashley Gabriel RN       Office Phone: [phone]  Office Cell: [phone]

## 2019-09-25 NOTE — PLAN OF CARE
Problem: Patient Care Overview  Goal: Plan of Care Review  Outcome: Ongoing (interventions implemented as appropriate)   09/25/19 0445   Coping/Psychosocial   Plan of Care Reviewed With patient   Plan of Care Review   Progress improving   OTHER   Outcome Summary Patient has not had any complaints, will monitor     Goal: Discharge Needs Assessment  Outcome: Ongoing (interventions implemented as appropriate)   09/25/19 0445   Discharge Needs Assessment   Readmission Within the Last 30 Days no previous admission in last 30 days   Concerns to be Addressed no discharge needs identified;denies needs/concerns at this time   Patient/Family Anticipates Transition to home with family   Transportation Concerns car, none   Transportation Anticipated family or friend will provide   Anticipated Changes Related to Illness none   Equipment Needed After Discharge none   Disability   Equipment Currently Used at Home bipap/cpap;walker, standard       Problem: Cardiac: ACS (Acute Coronary Syndrome) (Adult)  Goal: Signs and Symptoms of Listed Potential Problems Will be Absent, Minimized or Managed (Cardiac: ACS)  Outcome: Ongoing (interventions implemented as appropriate)   09/25/19 0445   Goal/Outcome Evaluation   Problems Assessed (Acute Coronary Syndrome) chest pain (angina)   Problems Present (Acute Coronary Syn) none

## 2019-09-25 NOTE — DISCHARGE SUMMARY
Date of Admission: 9/23/2019    Date of Discharge:  9/25/2019    Length of stay:  LOS: 0 days     Admission Diagnosis:   Chest pain     Bradycardia     CAD, chronic with history of CABG with stents     Hypoalemia     Hypo-magnesium    Discharge Diagnosis:     Chest pain  - ACS ruled out      Bradycardia s/p loop recorded placement 9/25/19  - Hold metoprolol  - discussed with cardiology will plan loop recorder placement and follow up may consider ablation for PVCs at a later date      CAD, chronic with history of CABG with stents  - Continue Plavix, aspirin, statin     Hypokalemia  - replaced      Hypo-magnesium  - replaced      Osteoporosis  - Hold Fosamax     Hypertension, chronic  - Continue amlodipine; hold hydrochlorothiazide and metoprolol      HLD, chronic  - Continue atorvastatin and Zetia     Dietary supplementation  - Continue vitamin B12, vitamin D     COPD, chronic  - Continue Symbicort  - stable, no exacerbation      Seizure disorder  - Continue Keppra     Hypothyroidism  - TSH 5.76  - consider increasing synthroid     GERD, chronic  - Continue Protonix     RA, chronic  - Continue sulfasalazine, Lyrica        Hospital Course:  Patient is a 73 y.o. female presented to the ED complaining of chest pain and erratic heart rate as noted on her automated blood pressure cuff.  The patient reports that her rate was reading 30 to 130 with a labile blood pressure.    Patient was noted to have multiple PVCs which shows bradycardia as they are not counted. Metoprolol has been held and loop recorder has been placed. Patient will need close follow up with Dr. Winn and may need to consider ablation as no true indication for pacemaker at this time.       Procedures Performed:         Consults:   Consults     Date and Time Order Name Status Description    9/24/2019 0326 Inpatient Cardiology Consult      9/23/2019 2007 Hospitalist (on-call MD unless specified) Completed     9/18/2019 9083 Hospitalist (on-call MD unless  specified) Completed           Vital Signs:  Temp:  [97.6 °F (36.4 °C)-98.3 °F (36.8 °C)] 97.6 °F (36.4 °C)  Heart Rate:  [42-89] 42  Resp:  [12-18] 18  BP: (130-157)/(67-84) 154/84      Physical Exam:  Physical Exam   Constitutional: She appears well-developed and well-nourished.   Cardiovascular:   No murmur heard.  Pulmonary/Chest: Effort normal and breath sounds normal.   Abdominal: Soft. Bowel sounds are normal.             Pertinent Test Results:   Lab Results (last 72 hours)     Procedure Component Value Units Date/Time    Basic Metabolic Panel [[id number]]  (Abnormal) Collected:  09/25/19 0301    Specimen:  Blood Updated:  09/25/19 0434     Glucose 111 mg/dL      BUN 9 mg/dL      Creatinine 1.00 mg/dL      Sodium 144 mmol/L      Potassium 4.5 mmol/L      Chloride 108 mmol/L      CO2 28.0 mmol/L      Calcium 9.0 mg/dL      eGFR Non African Amer 55 mL/min/1.73      BUN/Creatinine Ratio 9.0     Anion Gap 12.5 mmol/L     Narrative:       The MDRD GFR formula is only valid for adults with stable renal function between ages 18 and 70.    Magnesium [[id number]]  (Normal) Collected:  09/25/19 0301    Specimen:  Blood Updated:  09/25/19 0434     Magnesium 2.0 mg/dL     TSH [[id number]]  (Normal) Collected:  09/25/19 0301    Specimen:  Blood Updated:  09/25/19 0434     TSH 3.460 uIU/mL      Comment: Results may be falsely decreased if patient taking Biotin.       Troponin [[id number]]  (Normal) Collected:  09/25/19 0301    Specimen:  Blood Updated:  09/25/19 0425     Troponin I <0.030 ng/mL     Narrative:       Troponin I Reference Range:    0.00-0.03  Negative.  Repeat testing in 4-6 hours if clinically indicated.    0.04-0.29  Suspicious for myocardial injury. Serial measurements and clinical  correlation may be necessary to confirm or exclude diagnosis of acute  coronary syndrome.  Repeat testing in 4-6 hours if indicated.     >0.29 Consistent with myocardial injury.  Recommend clinical and laboratory correlation.      Results my be falsely decreased if patient taking Biotin.     Troponin [[id number]]  (Normal) Collected:  09/24/19 1115    Specimen:  Blood Updated:  09/24/19 1216     Troponin I <0.030 ng/mL     Narrative:       Troponin I Reference Range:    0.00-0.03  Negative.  Repeat testing in 4-6 hours if clinically indicated.    0.04-0.29  Suspicious for myocardial injury. Serial measurements and clinical  correlation may be necessary to confirm or exclude diagnosis of acute  coronary syndrome.  Repeat testing in 4-6 hours if indicated.     >0.29 Consistent with myocardial injury.  Recommend clinical and laboratory correlation.     Results my be falsely decreased if patient taking Biotin.     POC Glucose Once [[id number]]  (Abnormal) Collected:  09/24/19 1143    Specimen:  Blood Updated:  09/24/19 1151     Glucose 128 mg/dL      Comment: Serial Number: [id number]:  [id number]       POC Glucose Once [[id number]]  (Normal) Collected:  09/24/19 0748    Specimen:  Blood Updated:  09/24/19 0749     Glucose 94 mg/dL      Comment: Serial Number: [id number]:  [id number]       Basic Metabolic Panel [[id number]]  (Abnormal) Collected:  09/24/19 0555    Specimen:  Blood Updated:  09/24/19 0720     Glucose 99 mg/dL      BUN 10 mg/dL      Creatinine 0.90 mg/dL      Sodium 141 mmol/L      Potassium 4.4 mmol/L      Comment: Specimen hemolyzed.  Results may be affected.        Chloride 108 mmol/L      CO2 24.0 mmol/L      Calcium 8.4 mg/dL      eGFR Non African Amer 62 mL/min/1.73      BUN/Creatinine Ratio 11.1     Anion Gap 13.4 mmol/L     Narrative:       The MDRD GFR formula is only valid for adults with stable renal function between ages 18 and 70.    Magnesium [[id number]]  (Normal) Collected:  09/24/19 0555    Specimen:  Blood Updated:  09/24/19 0720     Magnesium 2.2 mg/dL      Comment: Specimen hemolyzed.  Results may be affected.       TSH [[id number]]  (Abnormal) Collected:  09/24/19 0555    Specimen:  Blood Updated:   09/24/19 0719     TSH 5.760 uIU/mL      Comment: Results may be falsely decreased if patient taking Biotin.       Troponin [[id number]]  (Normal) Collected:  09/24/19 0555    Specimen:  Blood Updated:  09/24/19 0707     Troponin I <0.030 ng/mL     Narrative:       Troponin I Reference Range:    0.00-0.03  Negative.  Repeat testing in 4-6 hours if clinically indicated.    0.04-0.29  Suspicious for myocardial injury. Serial measurements and clinical  correlation may be necessary to confirm or exclude diagnosis of acute  coronary syndrome.  Repeat testing in 4-6 hours if indicated.     >0.29 Consistent with myocardial injury.  Recommend clinical and laboratory correlation.     Results my be falsely decreased if patient taking Biotin.     CBC Auto Differential [[id number]]  (Abnormal) Collected:  09/24/19 0555    Specimen:  Blood Updated:  09/24/19 0644     WBC 5.60 10*3/mm3      RBC 4.93 10*6/mm3      Hemoglobin 15.3 g/dL      Hematocrit 45.6 %      MCV 92.5 fL      MCH 30.9 pg      MCHC 33.4 g/dL      RDW 12.9 %      RDW-SD 41.6 fl      MPV 9.7 fL      Platelets 169 10*3/mm3      Neutrophil % 49.5 %      Lymphocyte % 34.5 %      Monocyte % 12.5 %      Eosinophil % 2.7 %      Basophil % 0.8 %      Neutrophils, Absolute 2.80 10*3/mm3      Lymphocytes, Absolute 1.90 10*3/mm3      Monocytes, Absolute 0.70 10*3/mm3      Eosinophils, Absolute 0.20 10*3/mm3      Basophils, Absolute 0.00 10*3/mm3      nRBC 0.0 /100 WBC     Troponin [[id number]]  (Normal) Collected:  09/24/19 0007    Specimen:  Blood Updated:  09/24/19 0100     Troponin I <0.030 ng/mL     Narrative:       Troponin I Reference Range:    0.00-0.03  Negative.  Repeat testing in 4-6 hours if clinically indicated.    0.04-0.29  Suspicious for myocardial injury. Serial measurements and clinical  correlation may be necessary to confirm or exclude diagnosis of acute  coronary syndrome.  Repeat testing in 4-6 hours if indicated.     >0.29 Consistent with myocardial  injury.  Recommend clinical and laboratory correlation.     Results my be falsely decreased if patient taking Biotin.     Extra Tubes [[id number]] Collected:  09/23/19 1748    Specimen:  Blood, Venous Line Updated:  09/23/19 1900    Narrative:       The following orders were created for panel order Extra Tubes.  Procedure                               Abnormality         Status                     ---------                               -----------         ------                     Light Blue Top[[id number]]                                   Final result                 Please view results for these tests on the individual orders.    Light Blue Top [[id number]] Collected:  09/23/19 1748    Specimen:  Blood Updated:  09/23/19 1900     Extra Tube hold for add-on     Comment: Auto resulted       Basic Metabolic Panel [[id number]]  (Abnormal) Collected:  09/23/19 1748    Specimen:  Blood from Hand, Left Updated:  09/23/19 1846     Glucose 104 mg/dL      BUN 10 mg/dL      Creatinine 0.80 mg/dL      Sodium 135 mmol/L      Potassium 3.4 mmol/L      Chloride 101 mmol/L      CO2 23.0 mmol/L      Calcium 8.6 mg/dL      eGFR Non African Amer 71 mL/min/1.73      BUN/Creatinine Ratio 12.5     Anion Gap 14.4 mmol/L     Narrative:       The MDRD GFR formula is only valid for adults with stable renal function between ages 18 and 70.    Magnesium [[id number]]  (Abnormal) Collected:  09/23/19 1748    Specimen:  Blood from Hand, Left Updated:  09/23/19 1846     Magnesium 1.7 mg/dL     Troponin [[id number]]  (Normal) Collected:  09/23/19 1748    Specimen:  Blood from Hand, Left Updated:  09/23/19 1827     Troponin I <0.030 ng/mL     Narrative:       Troponin I Reference Range:    0.00-0.03  Negative.  Repeat testing in 4-6 hours if clinically indicated.    0.04-0.29  Suspicious for myocardial injury. Serial measurements and clinical  correlation may be necessary to confirm or exclude diagnosis of acute  coronary syndrome.  Repeat testing in  4-6 hours if indicated.     >0.29 Consistent with myocardial injury.  Recommend clinical and laboratory correlation.     Results my be falsely decreased if patient taking Biotin.     CBC & Differential [[id number]] Collected:  09/23/19 1748    Specimen:  Blood Updated:  09/23/19 1804    Narrative:       The following orders were created for panel order CBC & Differential.  Procedure                               Abnormality         Status                     ---------                               -----------         ------                     CBC Auto Differential[[id number]]        Normal              Final result                 Please view results for these tests on the individual orders.    CBC Auto Differential [[id number]]  (Normal) Collected:  09/23/19 1748    Specimen:  Blood from Hand, Left Updated:  09/23/19 1804     WBC 6.90 10*3/mm3      RBC 4.77 10*6/mm3      Hemoglobin 14.8 g/dL      Hematocrit 44.2 %      MCV 92.8 fL      MCH 31.0 pg      MCHC 33.4 g/dL      RDW 13.0 %      RDW-SD 42.0 fl      MPV 9.6 fL      Platelets 179 10*3/mm3      Neutrophil % 52.5 %      Lymphocyte % 32.9 %      Monocyte % 11.9 %      Eosinophil % 2.4 %      Basophil % 0.3 %      Neutrophils, Absolute 3.60 10*3/mm3      Lymphocytes, Absolute 2.30 10*3/mm3      Monocytes, Absolute 0.80 10*3/mm3      Eosinophils, Absolute 0.20 10*3/mm3      Basophils, Absolute 0.00 10*3/mm3      nRBC 0.1 /100 WBC               Results for orders placed during the hospital encounter of 06/17/19   Adult Transthoracic Echo Complete W/ Cont if Necessary Per Protocol    Narrative Technically satisfactory study.  Mitral valve is thickened with adequate opening motion.  Aortic valve is normal.  Tricuspid valve is normal.  Left atrium is normal in size.  Left ventricle is normal in size and contractility with ejection fraction   of 60%.  Atrial septum is intact.  No pericardial effusion or intracardiac thrombus is seen.  The right atrium and right  ventricle are normal.  Aorta is normal.    Impression  Thickened mitral valve with adequate opening motion.  Normal left ventricle size and contractility with ejection fraction of   60%.       Imaging Results (all)     Procedure Component Value Units Date/Time    XR Chest 1 View [[id number]] Collected:  09/23/19 1835     Updated:  09/23/19 1838    Narrative:       Examination: XR CHEST 1 VW-     Date of Exam: 9/23/2019 6:25 PM     Indication: Chest pain.     Comparison: 6/19/2019     Technique: 1 view of the chest      Findings:  There are no airspace consolidations. No pleural effusions. No  pneumothorax. The heart size is normal. The pulmonary vasculature  appears within normal limits. Stable granulomatous calcifications No  acute osseous abnormality identified.       Impression:       No acute cardiopulmonary process.     Electronically Signed By-Silvina Boyce On:9/23/2019 6:36 PM  This report was finalized on [id number] by  Silvina Boyce, .                Discharge Disposition:  Home or Self Care    Discharge Medications:     Discharge Medications      Continue These Medications      Instructions Start Date   alendronate 70 MG tablet  Commonly known as:  FOSAMAX   70 mg, Oral, Weekly      amLODIPine 5 MG tablet  Commonly known as:  NORVASC   5 mg, Oral, Every 24 Hours Scheduled, For high blood pressure.      aspirin 81 MG chewable tablet   81 mg, Oral, Daily      atorvastatin 10 MG tablet  Commonly known as:  LIPITOR   20 mg, Oral, Nightly      budesonide-formoterol 160-4.5 MCG/ACT inhaler  Commonly known as:  SYMBICORT   2 puffs, Inhalation, 2 Times Daily - RT, Rinse mouth after using.      Cholecalciferol 11594 units tablet   2,000 Units, Oral, 2 Times Daily      clopidogrel 75 MG tablet  Commonly known as:  PLAVIX   75 mg, Oral, Daily      cyanocobalamin 250 MCG tablet  Commonly known as:  VITAMIN B-12   250 mcg, Oral, Daily      ezetimibe 10 MG tablet  Commonly known as:  ZETIA   10 mg, Oral, Daily       levETIRAcetam 500 MG tablet  Commonly known as:  KEPPRA   500 mg, Oral, 2 Times Daily      levothyroxine 25 MCG tablet  Commonly known as:  SYNTHROID, LEVOTHROID   25 mcg, Oral, Daily      pantoprazole 20 MG EC tablet  Commonly known as:  PROTONIX   20 mg, Oral, Daily      pregabalin 100 MG capsule  Commonly known as:  LYRICA   100 mg, Oral, 3 Times Daily      sulfaSALAzine 500 MG tablet  Commonly known as:  AZULFIDINE   1,000 mg, Oral, 2 Times Daily      vitamin b complex capsule capsule   1 capsule, Oral, Daily         Stop These Medications    hydroCHLOROthiazide 12.5 MG capsule  Commonly known as:  MICROZIDE     metoprolol succinate XL 25 MG 24 hr tablet  Commonly known as:  TOPROL-XL            Discharge Diet:   Diet Instructions     Diet: Cardiac      Discharge Diet:  Cardiac          Activity at Discharge:   Activity Instructions     Activity as Tolerated            Follow-up Appointments:  Future Appointments   Date Time Provider Department Center   10/7/2019 11:20 AM Real Winn MD [facility] NA   1/21/2020  9:00 AM Ciera Garcia MD [facility] None   2/11/2020  9:50 AM Real Winn MD [facility] NA         Test Results Pending at Discharge:  none    Condition on Discharge:    Stable      Risk for Readmission (LACE): Score: 3 (9/25/2019  6:00 AM)          Remy Dias DO  09/25/19  3:21 PM

## 2019-09-26 NOTE — OUTREACH NOTE
Prep Survey      Responses   Facility patient discharged from?  [facility]   Is patient eligible?  Yes   Discharge diagnosis  Chest pain   Does the patient have one of the following disease processes/diagnoses(primary or secondary)?  Other   Does the patient have Home health ordered?  No   Is there a DME ordered?  No   Prep survey completed?  Yes          Bonnie Meza RN

## 2019-09-27 NOTE — OUTREACH NOTE
Medical Week 1 Survey      Responses   Facility patient discharged from?  [facility]   Does the patient have one of the following disease processes/diagnoses(primary or secondary)?  Other   Is there a successful TCM telephone encounter documented?  No   Week 1 attempt successful?  Yes   Call start time  1449   Call end time  1451   Discharge diagnosis  Chest pain   Meds reviewed with patient/caregiver?  Yes   Is the patient having any side effects they believe may be caused by any medication additions or changes?  No   Does the patient have all medications ordered at discharge?  N/A   Is the patient taking all medications as directed (includes completed medication regime)?  Yes   Does the patient have a primary care provider?   Yes   Does the patient have an appointment with their PCP within 7 days of discharge?  Yes   Has the patient kept scheduled appointments due by today?  N/A   Has home health visited the patient within 72 hours of discharge?  N/A   Did the patient receive a copy of their discharge instructions?  Yes   Nursing interventions  Reviewed instructions with patient   What is the patient's perception of their health status since discharge?  Improving   Is the patient/caregiver able to teach back signs and symptoms related to disease process for when to call PCP?  Yes   Is the patient/caregiver able to teach back signs and symptoms related to disease process for when to call 911?  Yes   Is the patient/caregiver able to teach back the hierarchy of who to call/visit for symptoms/problems? PCP, Specialist, Home health nurse, Urgent Care, ED, 911  Yes   Week 1 call completed?  Yes   Graduated  Yes   Did the patient feel the follow up calls were helpful during their recovery period?  Yes   Was the number of calls appropriate?  Yes          Mary Waters LPN

## 2019-10-03 NOTE — TELEPHONE ENCOUNTER
MRI R Knee - Dx on order says chronic pain which will not get the pre-certed.  She has a Dx of osteoarthritis in her chart but no x-rays of the knee? She needs to have x-rays first.  [facility], can you order this and let the patient know? Thanks.

## 2019-10-07 PROBLEM — M12.849 OTHER SPECIFIC ARTHROPATHIES, NOT ELSEWHERE CLASSIFIED, UNSPECIFIED HAND: Status: ACTIVE | Noted: 2018-07-20

## 2019-10-07 PROBLEM — R89.9 ABNORMAL LABORATORY TEST: Status: ACTIVE | Noted: 2019-01-07

## 2019-10-07 PROBLEM — M54.2 NECK PAIN: Status: ACTIVE | Noted: 2017-11-16

## 2019-10-07 PROBLEM — M85.80 OSTEOPENIA: Status: ACTIVE | Noted: 2019-05-14

## 2019-10-07 PROBLEM — M85.88 OTHER SPECIFIED DISORDERS OF BONE DENSITY AND STRUCTURE, OTHER SITE: Status: ACTIVE | Noted: 2019-05-14

## 2019-10-07 PROBLEM — Z79.899 OTHER LONG TERM (CURRENT) DRUG THERAPY: Status: ACTIVE | Noted: 2019-01-07

## 2019-10-07 PROBLEM — G40.A09 ABSENCE EPILEPSY (HCC): Status: ACTIVE | Noted: 2018-04-19

## 2019-10-07 PROBLEM — M62.81 MUSCLE WEAKNESS OF LOWER EXTREMITY: Status: ACTIVE | Noted: 2017-11-03

## 2019-10-07 PROBLEM — R42 DIZZINESS: Status: ACTIVE | Noted: 2019-01-01

## 2019-10-07 PROBLEM — I49.5 SSS (SICK SINUS SYNDROME) (HCC): Status: ACTIVE | Noted: 2019-01-01

## 2019-10-07 PROBLEM — M65.30 TRIGGER FINGER: Status: ACTIVE | Noted: 2018-11-06

## 2019-10-07 PROBLEM — N18.30 CHRONIC RENAL INSUFFICIENCY, STAGE III (MODERATE) (HCC): Status: ACTIVE | Noted: 2019-01-07

## 2019-10-07 PROBLEM — I65.23 BILATERAL CAROTID ARTERY STENOSIS: Status: ACTIVE | Noted: 2017-05-24

## 2019-10-07 PROBLEM — M79.604 LEG PAIN, BILATERAL: Status: ACTIVE | Noted: 2018-02-02

## 2019-10-07 PROBLEM — M32.9 SLE (SYSTEMIC LUPUS ERYTHEMATOSUS) (HCC): Status: ACTIVE | Noted: 2018-11-06

## 2019-10-07 PROBLEM — R73.9 HYPERGLYCEMIA: Status: ACTIVE | Noted: 2017-05-18

## 2019-10-07 PROBLEM — Z87.898 PERSONAL HISTORY OF OTHER SPECIFIED CONDITIONS: Status: ACTIVE | Noted: 2018-11-06

## 2019-10-07 PROBLEM — M79.605 LEG PAIN, BILATERAL: Status: ACTIVE | Noted: 2018-02-02

## 2019-10-07 PROBLEM — Z87.891 FORMER SMOKER: Status: ACTIVE | Noted: 2017-11-16

## 2019-10-07 PROBLEM — N39.0 URINARY TRACT INFECTION: Status: ACTIVE | Noted: 2018-11-06

## 2019-10-07 PROBLEM — I73.00 RAYNAUDS DISEASE: Status: ACTIVE | Noted: 2018-11-06

## 2019-10-07 PROBLEM — E53.8 B12 DEFICIENCY: Status: ACTIVE | Noted: 2017-05-18

## 2019-10-07 PROBLEM — Z95.818 STATUS POST PLACEMENT OF IMPLANTABLE LOOP RECORDER: Status: ACTIVE | Noted: 2019-01-01

## 2019-10-07 NOTE — PROGRESS NOTES
Encounter Date:10/07/2019  Follow-up recent hospitalization      Patient ID: Delphine Rob is a 73 y.o. female.    Chief Complaint:  Multiple episodes of dizziness and near syncope  Status post CABG  Paroxysmal atrial fibrillation  Hypertension  Dyslipidemia      History of Present Illness  Patient has been having recurrent episodes of dizziness without syncope.  Recently patient had a loop recorder placement.  Patient was noted to have 5-second pause on the loop recorder tracing.    Since I have last seen, the patient has been without any chest discomfort ,shortness of breath, palpitations or syncope.  Denies having any headache ,abdominal pain ,nausea, vomiting , diarrhea constipation, loss of weight or loss of appetite.  Denies having any excessive bruising ,hematuria or blood in the stool.    Review of all systems negative except as indicated    Assessment and Plan       /////////////////  impression  ---------------------------  Patient has been having multiple episodes of spells starting with nausea with associated dizziness near syncope and chest discomfort.  Patient is having rapid heart rhythm intermittently.  Dizziness and near syncope.  Patient has sinus bradycardia which could be multifactorial including secondary to beta-blocker effect.  Patient has history of intermittent atrial fibrillation.  Tachy bradycardia syndrome.    - Status post loop recorder placement ([employer] Linq 9/25/2019).  Abnormal rhythm with 5-second pause on loop recorder tracing.    -Status post CABG 01/20/2010 at [facility].  Status post stent placement after CABG ( details not available).     Cardiac catheterization and coronary angiography 6/19/2019 revealed  Left ventricle size and contractility is normal with proximal inferior wall hypokinesis.  Ejection fraction is 55%.  Left main artery is normal.  Left anterior descending artery is noted in the midsegment with filling of the LAD through LIMA.  Circumflex coronary  artery is normal  Right coronary artery is a very small caliber vessel with 99% disease in the midsegment with filling of the distal vessel through collaterals.     RAUSCH to LAD is patent  SVG to marginal branch is patent  SVG to RCA is totally occluded.     Echocardiogram 17 2019 revealed mild mitral regurgitation and normal left ventricular function.    Carri scan Cardiolite test is negative for myocardial ischemia 11/13/2017     -Intermittent atrial fibrillation.  Patient is maintaining sinus rhythm.       -History of  stroke from which patient has recovered.      -Hypertension dyslipidemia and hypothyroidism      -Status post thyroidectomy for cyst cholecystectomy and hysterectomy.      -History of hepatitis-C      -Allergy to penicillin      -Former smoker quit smoking October 2013.      -Status post  Malignant melanoma removal from right elbow area.  Patient did not need chemotherapy     -history of peripheral vascular disease.  CTA 06/30/2017 revealed right superficial femoral artery occlusion  ---------------.  Plan  ----------------------  Patient has spells concern for tachybradycardia syndrome.  Patient has significant bradycardia with heart rates in the 30/min and also having palpitations.   Some of them could be due to premature ventricular contractions.-Pseudo-bradycardia  Hypokalemia-improved  Hypomagnesemia-improved  Patient is having symptoms of dizziness and near syncope.  Metoprolol is on hold at this time.  Status post loop recorder placement  Patient had 5-second pause noted on the loop recorder.  Patient continues to have dizziness.  Patient was advised permanent pacemaker implantation and removal of loop recorder.  Risks and benefits pros and cons of the procedure were discussed with patient.  Consideration may be given in the future for ablation of PVCs if that causes pseudo-bradycardia.  Medications were reviewed and updated.  Further plan will depend on patient's  progress.  ]]]]]]]]]]]]]]]]                  Diagnosis Plan   1. SSS (sick sinus syndrome) (CMS/HCC)  Case Request Cath Lab: pacemaker dual    CBC (No Diff)    Basic Metabolic Panel    Protime-INR    ECG 12 Lead    MRSA Screen Culture - Swab, Nares   2. Dizziness  Case Request Cath Lab: pacemaker dual    CBC (No Diff)    Basic Metabolic Panel    Protime-INR    ECG 12 Lead    MRSA Screen Culture - Swab, Nares   3. Status post placement of implantable loop recorder  Case Request Cath Lab: pacemaker dual    CBC (No Diff)    Basic Metabolic Panel    Protime-INR    ECG 12 Lead    MRSA Screen Culture - Swab, Nares   4. Bradycardia     5. Ventricular bigeminy     6. Paroxysmal atrial fibrillation (CMS/HCC)     7. Essential hypertension     8. Mixed hyperlipidemia     9. Coronary artery disease involving native coronary artery of native heart without angina pectoris     10. Acquired hypothyroidism     11. Dyslipidemia     LAB RESULTS (LAST 7 DAYS)    CBC        BMP        CMP         BNP        TROPONIN        CoAg        Creatinine Clearance  Estimated Creatinine Clearance: 48.9 mL/min (by C-G formula based on SCr of 1 mg/dL).    ABG        Radiology  No radiology results for the last day                The following portions of the patient's history were reviewed and updated as appropriate: allergies, current medications, past family history, past medical history, past social history, past surgical history and problem list.    Review of Systems   Constitution: Positive for malaise/fatigue.   Cardiovascular: Positive for palpitations. Negative for chest pain, leg swelling and syncope.   Respiratory: Positive for shortness of breath.    Skin: Negative for rash.   Gastrointestinal: Negative for nausea and vomiting.   Neurological: Positive for dizziness. Negative for light-headedness and numbness.         Current Outpatient Medications:   •  alendronate (FOSAMAX) 70 MG tablet, Take 1 tablet by mouth 1 (One) Time Per Week.,  Disp: 4 tablet, Rfl: 3  •  amLODIPine (NORVASC) 5 MG tablet, Take 1 tablet by mouth Daily. For high blood pressure., Disp: 30 tablet, Rfl: 5  •  aspirin 81 MG chewable tablet, Chew 1 tablet Daily., Disp: 30 tablet, Rfl: 0  •  atorvastatin (LIPITOR) 10 MG tablet, Take 20 mg by mouth Every Night., Disp: , Rfl:   •  B Complex Vitamins (VITAMIN B COMPLEX) capsule capsule, Take 1 capsule by mouth Daily., Disp: , Rfl:   •  budesonide-formoterol (SYMBICORT) 160-4.5 MCG/ACT inhaler, Inhale 2 puffs 2 (Two) Times a Day for 30 days. Rinse mouth after using., Disp: 1 inhaler, Rfl: 0  •  Cholecalciferol 77114 units tablet, Take 2,000 Units by mouth 2 (Two) Times a Day., Disp: , Rfl:   •  clopidogrel (PLAVIX) 75 MG tablet, Take 75 mg by mouth Daily., Disp: , Rfl:   •  cyanocobalamin (VITAMIN B-12) 250 MCG tablet, Take 250 mcg by mouth Daily., Disp: , Rfl:   •  ezetimibe (ZETIA) 10 MG tablet, Take 10 mg by mouth Daily., Disp: , Rfl:   •  levETIRAcetam (KEPPRA) 500 MG tablet, Take 500 mg by mouth 2 (Two) Times a Day., Disp: , Rfl:   •  levothyroxine (SYNTHROID, LEVOTHROID) 25 MCG tablet, Take 25 mcg by mouth Daily., Disp: , Rfl:   •  pantoprazole (PROTONIX) 20 MG EC tablet, Take 20 mg by mouth Daily., Disp: , Rfl:   •  pregabalin (LYRICA) 100 MG capsule, Take 100 mg by mouth 3 (Three) Times a Day., Disp: , Rfl:   •  sulfaSALAzine (AZULFIDINE) 500 MG tablet, Take 2 tablets by mouth 2 (Two) Times a Day., Disp: 360 tablet, Rfl: 0    Allergies   Allergen Reactions   • Penicillamine Unknown (See Comments)   • Penicillins Hives       Family History   Problem Relation Age of Onset   • Hypertension Mother    • Cancer Mother    • Gout Mother    • Asthma Mother    • Cancer Father    • Heart disease Brother    • Diabetes Other    • Cancer Other    • Tuberculosis Other        Past Surgical History:   Procedure Laterality Date   • APPENDECTOMY     • AXILLARY NODE DISSECTION Right 05/2014   • CARDIAC CATHETERIZATION N/A 6/19/2019    Procedure:  Left Heart Cath;  Surgeon: Real Winn MD;  Location:  [facility];  Service: Cardiovascular   • CARDIAC CATHETERIZATION N/A 6/19/2019    Procedure: Saphenous Vein Graft;  Surgeon: Real Winn MD;  Location:  [facility];  Service: Cardiovascular   • CARDIAC CATHETERIZATION N/A 6/19/2019    Procedure: Left ventriculography;  Surgeon: Real Winn MD;  Location:  [facility];  Service: Cardiovascular   • CARDIAC CATHETERIZATION N/A 6/19/2019    Procedure: Coronary angiography;  Surgeon: Real Winn MD;  Location: [facility] CATH INVASIVE LOCATION;  Service: Cardiovascular   • CATARACT EXTRACTION  01/2017    x 2   • CHOLECYSTECTOMY  2004   • COLONOSCOPY  04/05/2017   • CORONARY ANGIOPLASTY WITH STENT PLACEMENT      stent placement after CABG   • CORONARY ARTERY BYPASS GRAFT  01/20/2010   • CORONARY ARTERY BYPASS GRAFT     • FEMORAL POPLITEAL BYPASS Right 07/20/2017    Dr. Palmer   • INCISIONAL HERNIA REPAIR  09/28/2016    lap. Dr. West   • MOLE REMOVAL Right     arm   • OTHER SURGICAL HISTORY      laser surgery X 2   • PARTIAL HYSTERECTOMY     • THYROIDECTOMY, PARTIAL      goiter removed 1/2 thyroid       Past Medical History:   Diagnosis Date   • Arthritis    • Atrial fibrillation (CMS/HCC)    • Bronchitis    • Chickenpox    • COPD (chronic obstructive pulmonary disease) (CMS/HCC)    • Cramps of lower extremity     bilateral with weakness   • Heart disease    • Hepatitis C 2013    treatment x 11 months   • High cholesterol    • History of degenerative disc disease    • HLD (hyperlipidemia)    • HTN (hypertension)    • Hypothyroidism    • Measles    • Seizure disorder (CMS/HCC)    • Skin melanoma (CMS/HCC) 2013    s/p lymph node removal on the right   • Sleep apnea    • Status post placement of implantable loop recorder 09/25/2019   • Stroke (CMS/HCC)    • Whooping cough        Family History   Problem Relation Age of Onset   • Hypertension  "Mother    • Cancer Mother    • Gout Mother    • Asthma Mother    • Cancer Father    • Heart disease Brother    • Diabetes Other    • Cancer Other    • Tuberculosis Other        Social History     Socioeconomic History   • Marital status:      Spouse name: Not on file   • Number of children: 2   • Years of education: Not on file   • Highest education level: Not on file   Tobacco Use   • Smoking status: Former Smoker     Last attempt to quit: 6/17/2009     Years since quitting: 10.3   • Smokeless tobacco: Never Used   Substance and Sexual Activity   • Alcohol use: No     Frequency: Monthly or less     Comment: beer   • Drug use: No   • Sexual activity: No         Procedures      Objective:       Physical Exam    BP (!) 156/101 (BP Location: Left arm, Patient Position: Sitting, Cuff Size: Large Adult)   Pulse 80   Ht 157.5 cm (62\")   Wt 77.1 kg (170 lb)   SpO2 96%   BMI 31.09 kg/m²   The patient is alert, oriented and in no distress.    Vital signs as noted above.    Head and neck revealed no carotid bruits or jugular venous distension.  No thyromegaly or lymphadenopathy is present.    Lungs clear.  No wheezing.  Breath sounds are normal bilaterally.    Heart normal first and second heart sounds.  No murmur..  No pericardial rub is present.  No gallop is present.    Abdomen soft and nontender.  No organomegaly is present.    Extremities revealed good peripheral pulses without any pedal edema.    Skin warm and dry.  Loop recorder site looks normal.    Musculoskeletal system is grossly normal.    CNS grossly normal.        "

## 2019-10-07 NOTE — H&P (VIEW-ONLY)
Encounter Date:10/07/2019  Follow-up recent hospitalization      Patient ID: Delphine Rob is a 73 y.o. female.    Chief Complaint:  Multiple episodes of dizziness and near syncope  Status post CABG  Paroxysmal atrial fibrillation  Hypertension  Dyslipidemia      History of Present Illness  Patient has been having recurrent episodes of dizziness without syncope.  Recently patient had a loop recorder placement.  Patient was noted to have 5-second pause on the loop recorder tracing.    Since I have last seen, the patient has been without any chest discomfort ,shortness of breath, palpitations or syncope.  Denies having any headache ,abdominal pain ,nausea, vomiting , diarrhea constipation, loss of weight or loss of appetite.  Denies having any excessive bruising ,hematuria or blood in the stool.    Review of all systems negative except as indicated    Assessment and Plan       /////////////////  impression  ---------------------------  Patient has been having multiple episodes of spells starting with nausea with associated dizziness near syncope and chest discomfort.  Patient is having rapid heart rhythm intermittently.  Dizziness and near syncope.  Patient has sinus bradycardia which could be multifactorial including secondary to beta-blocker effect.  Patient has history of intermittent atrial fibrillation.  Tachy bradycardia syndrome.    – Status post loop recorder placement ([employer] Linq 9/25/2019).  Abnormal rhythm with 5-second pause on loop recorder tracing.    -Status post CABG 01/20/2010 at [facility].  Status post stent placement after CABG ( details not available).     Cardiac catheterization and coronary angiography 6/19/2019 revealed  Left ventricle size and contractility is normal with proximal inferior wall hypokinesis.  Ejection fraction is 55%.  Left main artery is normal.  Left anterior descending artery is noted in the midsegment with filling of the LAD through LIMA.  Circumflex coronary  artery is normal  Right coronary artery is a very small caliber vessel with 99% disease in the midsegment with filling of the distal vessel through collaterals.     RAUSCH to LAD is patent  SVG to marginal branch is patent  SVG to RCA is totally occluded.     Echocardiogram 17 2019 revealed mild mitral regurgitation and normal left ventricular function.    Carri scan Cardiolite test is negative for myocardial ischemia 11/13/2017     -Intermittent atrial fibrillation.  Patient is maintaining sinus rhythm.       -History of  stroke from which patient has recovered.      -Hypertension dyslipidemia and hypothyroidism      -Status post thyroidectomy for cyst cholecystectomy and hysterectomy.      -History of hepatitis-C      -Allergy to penicillin      -Former smoker quit smoking October 2013.      -Status post  Malignant melanoma removal from right elbow area.  Patient did not need chemotherapy     -history of peripheral vascular disease.  CTA 06/30/2017 revealed right superficial femoral artery occlusion  ---------------.  Plan  ----------------------  Patient has spells concern for tachybradycardia syndrome.  Patient has significant bradycardia with heart rates in the 30/min and also having palpitations.   Some of them could be due to premature ventricular contractions.–Pseudo-bradycardia  Hypokalemia–improved  Hypomagnesemia–improved  Patient is having symptoms of dizziness and near syncope.  Metoprolol is on hold at this time.  Status post loop recorder placement  Patient had 5-second pause noted on the loop recorder.  Patient continues to have dizziness.  Patient was advised permanent pacemaker implantation and removal of loop recorder.  Risks and benefits pros and cons of the procedure were discussed with patient.  Consideration may be given in the future for ablation of PVCs if that causes pseudo-bradycardia.  Medications were reviewed and updated.  Further plan will depend on patient's  progress.  ]]]]]]]]]]]]]]]]                  Diagnosis Plan   1. SSS (sick sinus syndrome) (CMS/HCC)  Case Request Cath Lab: pacemaker dual    CBC (No Diff)    Basic Metabolic Panel    Protime-INR    ECG 12 Lead    MRSA Screen Culture - Swab, Nares   2. Dizziness  Case Request Cath Lab: pacemaker dual    CBC (No Diff)    Basic Metabolic Panel    Protime-INR    ECG 12 Lead    MRSA Screen Culture - Swab, Nares   3. Status post placement of implantable loop recorder  Case Request Cath Lab: pacemaker dual    CBC (No Diff)    Basic Metabolic Panel    Protime-INR    ECG 12 Lead    MRSA Screen Culture - Swab, Nares   4. Bradycardia     5. Ventricular bigeminy     6. Paroxysmal atrial fibrillation (CMS/HCC)     7. Essential hypertension     8. Mixed hyperlipidemia     9. Coronary artery disease involving native coronary artery of native heart without angina pectoris     10. Acquired hypothyroidism     11. Dyslipidemia     LAB RESULTS (LAST 7 DAYS)    CBC        BMP        CMP         BNP        TROPONIN        CoAg        Creatinine Clearance  Estimated Creatinine Clearance: 48.9 mL/min (by C-G formula based on SCr of 1 mg/dL).    ABG        Radiology  No radiology results for the last day                The following portions of the patient's history were reviewed and updated as appropriate: allergies, current medications, past family history, past medical history, past social history, past surgical history and problem list.    Review of Systems   Constitution: Positive for malaise/fatigue.   Cardiovascular: Positive for palpitations. Negative for chest pain, leg swelling and syncope.   Respiratory: Positive for shortness of breath.    Skin: Negative for rash.   Gastrointestinal: Negative for nausea and vomiting.   Neurological: Positive for dizziness. Negative for light-headedness and numbness.         Current Outpatient Medications:   •  alendronate (FOSAMAX) 70 MG tablet, Take 1 tablet by mouth 1 (One) Time Per Week.,  Disp: 4 tablet, Rfl: 3  •  amLODIPine (NORVASC) 5 MG tablet, Take 1 tablet by mouth Daily. For high blood pressure., Disp: 30 tablet, Rfl: 5  •  aspirin 81 MG chewable tablet, Chew 1 tablet Daily., Disp: 30 tablet, Rfl: 0  •  atorvastatin (LIPITOR) 10 MG tablet, Take 20 mg by mouth Every Night., Disp: , Rfl:   •  B Complex Vitamins (VITAMIN B COMPLEX) capsule capsule, Take 1 capsule by mouth Daily., Disp: , Rfl:   •  budesonide-formoterol (SYMBICORT) 160-4.5 MCG/ACT inhaler, Inhale 2 puffs 2 (Two) Times a Day for 30 days. Rinse mouth after using., Disp: 1 inhaler, Rfl: 0  •  Cholecalciferol 57674 units tablet, Take 2,000 Units by mouth 2 (Two) Times a Day., Disp: , Rfl:   •  clopidogrel (PLAVIX) 75 MG tablet, Take 75 mg by mouth Daily., Disp: , Rfl:   •  cyanocobalamin (VITAMIN B-12) 250 MCG tablet, Take 250 mcg by mouth Daily., Disp: , Rfl:   •  ezetimibe (ZETIA) 10 MG tablet, Take 10 mg by mouth Daily., Disp: , Rfl:   •  levETIRAcetam (KEPPRA) 500 MG tablet, Take 500 mg by mouth 2 (Two) Times a Day., Disp: , Rfl:   •  levothyroxine (SYNTHROID, LEVOTHROID) 25 MCG tablet, Take 25 mcg by mouth Daily., Disp: , Rfl:   •  pantoprazole (PROTONIX) 20 MG EC tablet, Take 20 mg by mouth Daily., Disp: , Rfl:   •  pregabalin (LYRICA) 100 MG capsule, Take 100 mg by mouth 3 (Three) Times a Day., Disp: , Rfl:   •  sulfaSALAzine (AZULFIDINE) 500 MG tablet, Take 2 tablets by mouth 2 (Two) Times a Day., Disp: 360 tablet, Rfl: 0    Allergies   Allergen Reactions   • Penicillamine Unknown (See Comments)   • Penicillins Hives       Family History   Problem Relation Age of Onset   • Hypertension Mother    • Cancer Mother    • Gout Mother    • Asthma Mother    • Cancer Father    • Heart disease Brother    • Diabetes Other    • Cancer Other    • Tuberculosis Other        Past Surgical History:   Procedure Laterality Date   • APPENDECTOMY     • AXILLARY NODE DISSECTION Right 05/2014   • CARDIAC CATHETERIZATION N/A 6/19/2019    Procedure:  Left Heart Cath;  Surgeon: Real Winn MD;  Location:  [facility];  Service: Cardiovascular   • CARDIAC CATHETERIZATION N/A 6/19/2019    Procedure: Saphenous Vein Graft;  Surgeon: Real Winn MD;  Location:  [facility];  Service: Cardiovascular   • CARDIAC CATHETERIZATION N/A 6/19/2019    Procedure: Left ventriculography;  Surgeon: Real Winn MD;  Location:  [facility];  Service: Cardiovascular   • CARDIAC CATHETERIZATION N/A 6/19/2019    Procedure: Coronary angiography;  Surgeon: Real Winn MD;  Location: [facility] CATH INVASIVE LOCATION;  Service: Cardiovascular   • CATARACT EXTRACTION  01/2017    x 2   • CHOLECYSTECTOMY  2004   • COLONOSCOPY  04/05/2017   • CORONARY ANGIOPLASTY WITH STENT PLACEMENT      stent placement after CABG   • CORONARY ARTERY BYPASS GRAFT  01/20/2010   • CORONARY ARTERY BYPASS GRAFT     • FEMORAL POPLITEAL BYPASS Right 07/20/2017    Dr. Palmer   • INCISIONAL HERNIA REPAIR  09/28/2016    lap. Dr. West   • MOLE REMOVAL Right     arm   • OTHER SURGICAL HISTORY      laser surgery X 2   • PARTIAL HYSTERECTOMY     • THYROIDECTOMY, PARTIAL      goiter removed 1/2 thyroid       Past Medical History:   Diagnosis Date   • Arthritis    • Atrial fibrillation (CMS/HCC)    • Bronchitis    • Chickenpox    • COPD (chronic obstructive pulmonary disease) (CMS/HCC)    • Cramps of lower extremity     bilateral with weakness   • Heart disease    • Hepatitis C 2013    treatment x 11 months   • High cholesterol    • History of degenerative disc disease    • HLD (hyperlipidemia)    • HTN (hypertension)    • Hypothyroidism    • Measles    • Seizure disorder (CMS/HCC)    • Skin melanoma (CMS/HCC) 2013    s/p lymph node removal on the right   • Sleep apnea    • Status post placement of implantable loop recorder 09/25/2019   • Stroke (CMS/HCC)    • Whooping cough        Family History   Problem Relation Age of Onset   • Hypertension  "Mother    • Cancer Mother    • Gout Mother    • Asthma Mother    • Cancer Father    • Heart disease Brother    • Diabetes Other    • Cancer Other    • Tuberculosis Other        Social History     Socioeconomic History   • Marital status:      Spouse name: Not on file   • Number of children: 2   • Years of education: Not on file   • Highest education level: Not on file   Tobacco Use   • Smoking status: Former Smoker     Last attempt to quit: 6/17/2009     Years since quitting: 10.3   • Smokeless tobacco: Never Used   Substance and Sexual Activity   • Alcohol use: No     Frequency: Monthly or less     Comment: beer   • Drug use: No   • Sexual activity: No         Procedures      Objective:       Physical Exam    BP (!) 156/101 (BP Location: Left arm, Patient Position: Sitting, Cuff Size: Large Adult)   Pulse 80   Ht 157.5 cm (62\")   Wt 77.1 kg (170 lb)   SpO2 96%   BMI 31.09 kg/m²   The patient is alert, oriented and in no distress.    Vital signs as noted above.    Head and neck revealed no carotid bruits or jugular venous distension.  No thyromegaly or lymphadenopathy is present.    Lungs clear.  No wheezing.  Breath sounds are normal bilaterally.    Heart normal first and second heart sounds.  No murmur..  No pericardial rub is present.  No gallop is present.    Abdomen soft and nontender.  No organomegaly is present.    Extremities revealed good peripheral pulses without any pedal edema.    Skin warm and dry.  Loop recorder site looks normal.    Musculoskeletal system is grossly normal.    CNS grossly normal.        "

## 2019-10-08 NOTE — TELEPHONE ENCOUNTER
Pt has been in the hospital and would not like imaging done at the time. She will speak with Dr Madison at her next visit

## 2019-10-12 PROBLEM — Z95.0 PRESENCE OF PERMANENT CARDIAC PACEMAKER: Status: ACTIVE | Noted: 2019-01-01

## 2019-10-12 NOTE — DISCHARGE SUMMARY
Cardiology Short Stay Note      Patient Care Team:  Keith Alston MD as PCP - General    CHIEF COMPLAINT:   Sick sinus syndrome      HISTORY OF PRESENT ILLNESS:   This is a 73  female with recent diagnosis of sick sinus syndrome identified through loop recorder with 5-second pause.  Patient presented 10/11/2019 for elective permanent pacemaker implant.  Please see full procedure note.  Additional history of dyslipidemia, hypertension, atrial fibrillation, COPD.          ADMISSION  DIAGNOSIS    Atrial fibrillation (CMS/HCC)    SSS (sick sinus syndrome) (CMS/[facility])    Dizziness    Status post placement of implantable loop recorder    Presence of permanent cardiac pacemaker        Past Medical History:   Diagnosis Date   • Arthritis    • Atrial fibrillation (CMS/HCC)    • Bronchitis    • Chickenpox    • COPD (chronic obstructive pulmonary disease) (CMS/[facility])    • Cramps of lower extremity     bilateral with weakness   • Heart disease    • Hepatitis C 2013    treatment x 11 months   • High cholesterol    • History of degenerative disc disease    • HLD (hyperlipidemia)    • HTN (hypertension)    • Hypothyroidism    • Measles    • Seizure disorder (CMS/[facility])    • Skin melanoma (CMS/[facility]) 2013    s/p lymph node removal on the right   • Sleep apnea    • Status post placement of implantable loop recorder 09/25/2019   • Stroke (CMS/[facility])    • Whooping cough      Past Surgical History:   Procedure Laterality Date   • APPENDECTOMY     • AXILLARY NODE DISSECTION Right 05/2014   • CARDIAC CATHETERIZATION N/A 6/19/2019    Procedure: Left Heart Cath;  Surgeon: Real Winn MD;  Location:  [facility] CATH INVASIVE LOCATION;  Service: Cardiovascular   • CARDIAC CATHETERIZATION N/A 6/19/2019    Procedure: Saphenous Vein Graft;  Surgeon: Real Winn MD;  Location:  [facility] CATH INVASIVE LOCATION;  Service: Cardiovascular   • CARDIAC CATHETERIZATION N/A 6/19/2019    Procedure: Left ventriculography;  Surgeon:  "Real Winn MD;  Location: [facility] CATH INVASIVE LOCATION;  Service: Cardiovascular   • CARDIAC CATHETERIZATION N/A 6/19/2019    Procedure: Coronary angiography;  Surgeon: Real Winn MD;  Location: [facility] CATH INVASIVE LOCATION;  Service: Cardiovascular   • CATARACT EXTRACTION  01/2017    x 2   • CHOLECYSTECTOMY  2004   • COLONOSCOPY  04/05/2017   • CORONARY ANGIOPLASTY WITH STENT PLACEMENT      stent placement after CABG   • CORONARY ARTERY BYPASS GRAFT  01/20/2010   • CORONARY ARTERY BYPASS GRAFT     • FEMORAL POPLITEAL BYPASS Right 07/20/2017    Dr. Palmer   • INCISIONAL HERNIA REPAIR  09/28/2016    lap. Dr. West   • MOLE REMOVAL Right     arm   • OTHER SURGICAL HISTORY      laser surgery X 2   • PARTIAL HYSTERECTOMY     • THYROIDECTOMY, PARTIAL      goiter removed 1/2 thyroid     Family History   Problem Relation Age of Onset   • Hypertension Mother    • Cancer Mother    • Gout Mother    • Asthma Mother    • Cancer Father    • Heart disease Brother    • Diabetes Other    • Cancer Other    • Tuberculosis Other      Social History     Tobacco Use   • Smoking status: Former Smoker     Last attempt to quit: 6/17/2009     Years since quitting: 10.3   • Smokeless tobacco: Never Used   Substance Use Topics   • Alcohol use: No     Frequency: Monthly or less     Comment: beer   • Drug use: No     No medications prior to admission.     Allergies:  Penicillamine and Penicillins      There is no immunization history on file for this patient.        REVIEW OF SYSTEMS:  Pertinent items are noted in HPI, all other systems reviewed and negative    Vital Signs  Temp:  [98.1 °F (36.7 °C)] 98.1 °F (36.7 °C)  Heart Rate:  [] 90  Resp:  [16] 16  BP: (133-167)/(56-85) 154/72    Flowsheet Rows      First Filed Value   Admission Height  157.5 cm (62\") Documented at 10/11/2019 1011   Admission Weight  76.1 kg (167 lb 12.3 oz) Documented at 10/11/2019 1011           Physical Exam:    General: Alert, cooperative, no " distress, appears stated age  Head:  Normocephalic, atraumatic, mucous membranes moist  Eyes:  Conjunctiva/corneas clear, EOM's intact     Neck:  Supple,  no adenopathy;      Lungs: Clear to auscultation bilaterally, no wheezes rhonchi rales are noted  Chest wall: No tenderness  Heart::  Regular rate and rhythm, S1 and S2 normal, no murmur, rub or gallop  Abdomen: Soft, non-tender, nondistended bowel sounds active  Extremities: No cyanosis, clubbing, or edema groin soft no hematoma.  Pulses: 2+ and symmetric all extremities  Skin:  No rashes or lesions  Neuro/psych: A&O x3. CN II through XII are grossly intact with appropriate affect      Results Review:      I reviewed the patient's new clinical results.    CBC    Results from last 7 days   Lab Units 10/11/19  0927   WBC 10*3/mm3 4.60   HEMOGLOBIN g/dL 15.2   PLATELETS 10*3/mm3 189     Cr Clearance Estimated Creatinine Clearance: 54 mL/min (by C-G formula based on SCr of 0.9 mg/dL).  Coag   Results from last 7 days   Lab Units 10/11/19  0927   INR  1.13*     HbA1C   Lab Results   Component Value Date    HGBA1C 5.4 09/19/2019    HGBA1C 5.3 06/20/2019     Blood Glucose No results found for: POCGLU  Infection   Results from last 7 days   Lab Units 10/11/19  0927   MRSA SCREEN CX  No Methicillin Resistant Staphylococcus aureus isolated     CMP   Results from last 7 days   Lab Units 10/11/19  0927   SODIUM mmol/L 142   POTASSIUM mmol/L 4.5   CHLORIDE mmol/L 107   CO2 mmol/L 26.0   BUN mg/dL 7*   CREATININE mg/dL 0.90   GLUCOSE mg/dL 101*     ABG      UA      ELI  No results found for: POCMETH, POCAMPHET, POCBARBITUR, POCBENZO, POCCOCAINE, POCOPIATES, POCOXYCODO, POCPHENCYC, POCPROPOXY, POCTHC, POCTRICYC  Lysis Labs   Results from last 7 days   Lab Units 10/11/19  0927   INR  1.13*   HEMOGLOBIN g/dL 15.2   PLATELETS 10*3/mm3 189   CREATININE mg/dL 0.90     Radiology(recent) Xr Chest Pa & Lateral    Result Date: 10/12/2019  1. Status post left-sided AICD placement. No  pneumothorax. 2. Clear lungs.  Electronically Signed By-Cedric Lehman On:10/12/2019 9:13 AM This report was finalized on [id number] by  Cedric Lehman, .              Assessment/Plan     Atrial fibrillation (CMS/HCC)    SSS (sick sinus syndrome) (CMS/HCC)    Dizziness    Status post placement of implantable loop recorder    Presence of permanent cardiac pacemaker            I discussed the patients findings and my recommendations with patient and attending nurse.     DISCHARGE DIAGNOSIS    Atrial fibrillation (CMS/HCC)    SSS (sick sinus syndrome) (CMS/HCC)    Dizziness    Status post placement of implantable loop recorder    Presence of permanent cardiac pacemaker        Hospital Course  Patient is a 73 y.o. female presented with   history of presyncopal episodes who was found to have sick sinus syndrome-5-second pauses through loop recorder.  She presented for elective permanent pacemaker implant per Dr. Winn.  Please see full procedure note.  Patient was observed overnight with no incidents reported.  Device has been interrogated and functioning properly.  Patient is stable for discharge home today.    Past Medical History:     Past Medical History:   Diagnosis Date   • Arthritis    • Atrial fibrillation (CMS/HCC)    • Bronchitis    • Chickenpox    • COPD (chronic obstructive pulmonary disease) (CMS/HCC)    • Cramps of lower extremity     bilateral with weakness   • Heart disease    • Hepatitis C 2013    treatment x 11 months   • High cholesterol    • History of degenerative disc disease    • HLD (hyperlipidemia)    • HTN (hypertension)    • Hypothyroidism    • Measles    • Seizure disorder (CMS/HCC)    • Skin melanoma (CMS/HCC) 2013    s/p lymph node removal on the right   • Sleep apnea    • Status post placement of implantable loop recorder 09/25/2019   • Stroke (CMS/HCC)    • Whooping cough        Past Surgical History:     Past Surgical History:   Procedure Laterality Date   • APPENDECTOMY     • AXILLARY NODE  DISSECTION Right 05/2014   • CARDIAC CATHETERIZATION N/A 6/19/2019    Procedure: Left Heart Cath;  Surgeon: Real Winn MD;  Location:  [facility] CATH INVASIVE LOC[facility];  Service: Cardiovascular   • CARDIAC CATHETERIZATION N/A 6/19/2019    Procedure: Saphenous Vein Graft;  Surgeon: Real Winn MD;  Location:  [facility] CATH INVAS[facility] LOC[facility];  Service: Cardiovascular   • CARDIAC CATHETERIZATION N/A 6/19/2019    Procedure: Left ventriculography;  Surgeon: Real Winn MD;  Location:  [facility] CATH INVASIVE LOCATION;  Service: Cardiovascular   • CARDIAC CATHETERIZATION N/A 6/19/2019    Procedure: Coronary angiography;  Surgeon: Real Winn MD;  Location: [facility] CATH INVASIVE LOCATION;  Service: Cardiovascular   • CATARACT EXTRACTION  01/2017    x 2   • CHOLECYSTECTOMY  2004   • COLONOSCOPY  04/05/2017   • CORONARY ANGIOPLASTY WITH STENT PLACEMENT      stent placement after CABG   • CORONARY ARTERY BYPASS GRAFT  01/20/2010   • CORONARY ARTERY BYPASS GRAFT     • FEMORAL POPLITEAL BYPASS Right 07/20/2017    Dr. Palmer   • INCISIONAL HERNIA REPAIR  09/28/2016    lap. Dr. West   • MOLE REMOVAL Right     arm   • OTHER SURGICAL HISTORY      laser surgery X 2   • PARTIAL HYSTERECTOMY     • THYROIDECTOMY, PARTIAL      goiter removed 1/2 thyroid       Social History:   Social History     Socioeconomic History   • Marital status:      Spouse name: Not on file   • Number of children: 2   • Years of education: Not on file   • Highest education level: Not on file   Tobacco Use   • Smoking status: Former Smoker     Last attempt to quit: 6/17/2009     Years since quitting: 10.3   • Smokeless tobacco: Never Used   Substance and Sexual Activity   • Alcohol use: No     Frequency: Monthly or less     Comment: beer   • Drug use: No   • Sexual activity: No       Procedures Performed    Procedure(s):  pacemaker dual  Loop recorder removal  Thoracic venogram  -------------------       Consults:   Consults     Date and  Time Order Name Status Description    9/23/2019 2007 Hospitalist (on-call MD unless specified) Completed     9/18/2019 1903 Hospitalist (on-call MD unless specified) Completed           Condition on Discharge:      Discharge Disposition  Home or Self Care    Discharge Medications     Discharge Medications      Continue These Medications      Instructions Start Date   alendronate 70 MG tablet  Commonly known as:  FOSAMAX   70 mg, Oral, Weekly      amLODIPine 5 MG tablet  Commonly known as:  NORVASC   5 mg, Oral, Every 24 Hours Scheduled, For high blood pressure.      aspirin 81 MG chewable tablet   81 mg, Oral, Daily      atorvastatin 10 MG tablet  Commonly known as:  LIPITOR   20 mg, Oral, Nightly      budesonide-formoterol 160-4.5 MCG/ACT inhaler  Commonly known as:  SYMBICORT   2 puffs, Inhalation, 2 Times Daily - RT, Rinse mouth after using.      Cholecalciferol 52757 units tablet   2,000 Units, Oral, 2 Times Daily      clopidogrel 75 MG tablet  Commonly known as:  PLAVIX   75 mg, Oral, Daily      cyanocobalamin 250 MCG tablet  Commonly known as:  VITAMIN B-12   250 mcg, Oral, Daily      ezetimibe 10 MG tablet  Commonly known as:  ZETIA   10 mg, Oral, Daily      levETIRAcetam 500 MG tablet  Commonly known as:  KEPPRA   500 mg, Oral, 2 Times Daily      levothyroxine 25 MCG tablet  Commonly known as:  SYNTHROID, LEVOTHROID   25 mcg, Oral, Daily      pantoprazole 20 MG EC tablet  Commonly known as:  PROTONIX   20 mg, Oral, Daily      pregabalin 100 MG capsule  Commonly known as:  LYRICA   100 mg, Oral, 3 Times Daily      sulfaSALAzine 500 MG tablet  Commonly known as:  AZULFIDINE   1,000 mg, Oral, 2 Times Daily      vitamin b complex capsule capsule   1 capsule, Oral, Daily             Discharge Diet: Regular    Activity at Discharge: No elevation of left arm above shoulder height x4 weeks.    Follow-up Appointments patient to call Dr. Winn's office on Monday for appointment  Future Appointments   Date Time  Provider Department Center   10/28/2019  7:10 AM CHEMA OROURKE Curtis [facility]   1/21/2020  9:00 AM Ciera Garcia MD [facility] None   2/11/2020  9:50 AM Real Winn MD [facility]         Test Results Pending at Discharge none       Risk for Readmission (LACE) Score: 8 (10/12/2019  6:00 AM)          MARION Rawls  10/12/19  11:27 AM    Time: Discharge 15 min        Cardiology attending:  As above.  Agree with assessment and plan.  Seen and examined.  Chart and labs reviewed.  Device site with no evidence of hematoma.  Site sore.  Heart regular.  Cardiac status appropriate for discharge.  Follow-up with Dr. Winn in 1 week.

## 2019-10-13 NOTE — OUTREACH NOTE
Prep Survey      Responses   Facility patient discharged from?  [facility]   Is patient eligible?  Yes   Discharge diagnosis   elective permanent pacemaker implant   Does the patient have one of the following disease processes/diagnoses(primary or secondary)?  Other   Does the patient have Home health ordered?  No   Is there a DME ordered?  No   Prep survey completed?  Yes          Bonnie Meza RN

## 2019-10-14 NOTE — OUTREACH NOTE
Medical Week 1 Survey      Responses   Facility patient discharged from?  [facility]   Does the patient have one of the following disease processes/diagnoses(primary or secondary)?  Other   Is there a successful TCM telephone encounter documented?  No   Week 1 attempt successful?  Yes   Call start time  1844   Call end time  1848   Discharge diagnosis   elective permanent pacemaker implant   Is the patient taking all medications as directed (includes completed medication regime)?  Yes   Medication comments  No changes to medications   Does the patient have a primary care provider?   Yes   Does the patient have an appointment with their PCP within 7 days of discharge?  Greater than 7 days   What is preventing the patient from scheduling follow up appointments within 7 days of discharge?  Earlier appointment not available   Has the patient kept scheduled appointments due by today?  N/A   Comments  Patient has appt with Dr. Winn on Oct 31   Has home health visited the patient within 72 hours of discharge?  N/A   Did the patient receive a copy of their discharge instructions?  Yes   Nursing interventions  Reviewed instructions with patient   What is the patient's perception of their health status since discharge?  Improving   Is the patient/caregiver able to teach back signs and symptoms related to disease process for when to call PCP?  Yes   Is the patient/caregiver able to teach back signs and symptoms related to disease process for when to call 911?  Yes   Is the patient/caregiver able to teach back the hierarchy of who to call/visit for symptoms/problems? PCP, Specialist, Home health nurse, Urgent Care, ED, 911  Yes   Additional teach back comments  Patient states she has incision site covered due to when she wears sling it rubs against site.  No redness or drainage from site   Week 1 call completed?  Yes   Graduated  Yes   Graduated/Revoked comments  No questions or needs at this time          Pegyg Headley LPN

## 2019-10-31 NOTE — PROGRESS NOTES
Encounter Date:10/31/2019  Recent hospitalization with pacemaker      Patient ID: Delphine Rob is a 73 y.o. female.    Chief Complaint:  Recent pacemaker placement      History of Present Illness  Since I have last seen, the patient has been without any chest discomfort ,shortness of breath, palpitations, dizziness or syncope.  Denies having any headache ,abdominal pain ,nausea, vomiting , diarrhea constipation, loss of weight or loss of appetite.  Denies having any excessive bruising ,hematuria or blood in the stool.  Mild soreness at pacemaker site  Review of all systems negative except as indicated    Assessment and Plan       /////////////////  impression  ---------------------------  - Status post permanent dual-chamber pacemaker implantation 10/11/2019 ([employer] MRI compatible) and removal of loop recorder    - Status post loop recorder placement ([employer] 9/25/2019).  Abnormal rhythm with 5-second pause on loop recorder tracing.  Patient had multiple episodes of nausea dizziness near syncope and tachycardia bradycardia syndrome.  History of intermittent atrial fibrillation    -Status post CABG 01/20/2010 at [facility].  Status post stent placement after CABG ( details not available).     Cardiac catheterization and coronary angiography 6/19/2019 revealed  Left ventricle size and contractility is normal with proximal inferior wall hypokinesis.  Ejection fraction is 55%.  Left main artery is normal.  Left anterior descending artery is noted in the midsegment with filling of the LAD through LIMA.  Circumflex coronary artery is normal  Right coronary artery is a very small caliber vessel with 99% disease in the midsegment with filling of the distal vessel through collaterals.  RAUSCH to LAD is patent  SVG to marginal branch is patent  SVG to RCA is totally occluded.     Echocardiogram 17 2019 revealed mild mitral regurgitation and normal left ventricular function.    [facility] scan Cardiolite  test is negative for myocardial ischemia 11/13/2017     -Intermittent atrial fibrillation.  Patient is maintaining sinus rhythm.       -History of  stroke from which patient has recovered.      -Hypertension dyslipidemia and hypothyroidism      -Status post thyroidectomy for cyst cholecystectomy and hysterectomy.      -History of hepatitis-C      -Allergy to penicillin      -Former smoker quit smoking October 2013.      -Status post  Malignant melanoma removal from right elbow area.  Patient did not need chemotherapy     -history of peripheral vascular disease.  CTA 06/30/2017 revealed right superficial femoral artery occlusion  ---------------.  Plan  ----------------------  Status post pacemaker implantation.  Pacemaker site is normal except for small pocket hematoma.  Incision is intact without any signs of inflammation or infection.  Interrogation of the pacemaker reveal excellent pacing parameters.  Patient has significant bradycardia with heart rates in the 30/min and also having palpitations.   Some of them could be due to premature ventricular contractions.-Pseudo-bradycardia  Hypokalemia-improved  Hypomagnesemia-improved  Restart metoprolol.  Continue amlodipine.  Medications were reviewed and updated.  Follow-up in office in 3 months with pacemaker interrogation.  Further plan will depend on patient's progress.  ]]]]]]]]]]]]]]]]               Diagnosis Plan   1. Presence of permanent cardiac pacemaker     LAB RESULTS (LAST 7 DAYS)    CBC        BMP        CMP         BNP        TROPONIN        CoAg        Creatinine Clearance  Estimated Creatinine Clearance: 54.6 mL/min (by C-G formula based on SCr of 0.9 mg/dL).    ABG        Radiology  No radiology results for the last day                The following portions of the patient's history were reviewed and updated as appropriate: allergies, current medications, past family history, past medical history, past social history, past surgical history and problem  list.    Review of Systems   Constitution: Negative for malaise/fatigue.   Cardiovascular: Negative for chest pain, leg swelling, palpitations and syncope.   Respiratory: Positive for shortness of breath (with exertion).    Skin: Negative for rash.   Gastrointestinal: Negative for nausea and vomiting.   Neurological: Positive for light-headedness (at times). Negative for dizziness and numbness.         Current Outpatient Medications:   •  alendronate (FOSAMAX) 70 MG tablet, Take 1 tablet by mouth 1 (One) Time Per Week., Disp: 4 tablet, Rfl: 3  •  amLODIPine (NORVASC) 5 MG tablet, Take 1 tablet by mouth Daily. For high blood pressure., Disp: 30 tablet, Rfl: 5  •  aspirin 81 MG chewable tablet, Chew 1 tablet Daily., Disp: 30 tablet, Rfl: 0  •  atorvastatin (LIPITOR) 10 MG tablet, Take 20 mg by mouth Every Night., Disp: , Rfl:   •  B Complex Vitamins (VITAMIN B COMPLEX) capsule capsule, Take 1 capsule by mouth Daily., Disp: , Rfl:   •  Cholecalciferol 47841 units tablet, Take 2,000 Units by mouth 2 (Two) Times a Day., Disp: , Rfl:   •  clopidogrel (PLAVIX) 75 MG tablet, Take 75 mg by mouth Daily., Disp: , Rfl:   •  cyanocobalamin (VITAMIN B-12) 250 MCG tablet, Take 250 mcg by mouth Daily., Disp: , Rfl:   •  ezetimibe (ZETIA) 10 MG tablet, Take 10 mg by mouth Daily., Disp: , Rfl:   •  levETIRAcetam (KEPPRA) 500 MG tablet, Take 500 mg by mouth 2 (Two) Times a Day., Disp: , Rfl:   •  levothyroxine (SYNTHROID, LEVOTHROID) 25 MCG tablet, Take 25 mcg by mouth Daily., Disp: , Rfl:   •  pantoprazole (PROTONIX) 20 MG EC tablet, Take 20 mg by mouth Daily., Disp: , Rfl:   •  pregabalin (LYRICA) 100 MG capsule, Take 100 mg by mouth 3 (Three) Times a Day., Disp: , Rfl:   •  sulfaSALAzine (AZULFIDINE) 500 MG tablet, Take 2 tablets by mouth 2 (Two) Times a Day., Disp: 360 tablet, Rfl: 0    Allergies   Allergen Reactions   • Penicillamine Unknown (See Comments)   • Penicillins Hives       Family History   Problem Relation Age of Onset    • Hypertension Mother    • Cancer Mother    • Gout Mother    • Asthma Mother    • Cancer Father    • Heart disease Brother    • Diabetes Other    • Cancer Other    • Tuberculosis Other        Past Surgical History:   Procedure Laterality Date   • APPENDECTOMY     • AXILLARY NODE DISSECTION Right 05/2014   • CARDIAC CATHETERIZATION N/A 6/19/2019    Procedure: Left Heart Cath;  Surgeon: Real Winn MD;  Location:  [facility];  Service: Cardiovascular   • CARDIAC CATHETERIZATION N/A 6/19/2019    Procedure: Saphenous Vein Graft;  Surgeon: Real Winn MD;  Location:  [facility];  Service: Cardiovascular   • CARDIAC CATHETERIZATION N/A 6/19/2019    Procedure: Left ventriculography;  Surgeon: Real Winn MD;  Location:  [facility];  Service: Cardiovascular   • CARDIAC CATHETERIZATION N/A 6/19/2019    Procedure: Coronary angiography;  Surgeon: Real Winn MD;  Location:  [facility];  Service: Cardiovascular   • CARDIAC CATHETERIZATION N/A 10/11/2019    Procedure: Thoracic venogram;  Surgeon: Real Winn MD;  Location:  [facility];  Service: Cardiovascular   • CARDIAC ELECTROPHYSIOLOGY PROCEDURE Left 10/11/2019    Procedure: pacemaker dual;  Surgeon: Real Winn MD;  Location:  [facility];  Service: Cardiovascular   • CARDIAC ELECTROPHYSIOLOGY PROCEDURE N/A 10/11/2019    Procedure: Loop recorder removal;  Surgeon: Real Winn MD;  Location:  [facility];  Service: Cardiovascular   • CATARACT EXTRACTION  01/2017    x 2   • CHOLECYSTECTOMY  2004   • COLONOSCOPY  04/05/2017   • CORONARY ANGIOPLASTY WITH STENT PLACEMENT      stent placement after CABG   • CORONARY ARTERY BYPASS GRAFT  01/20/2010   • CORONARY ARTERY BYPASS GRAFT     • FEMORAL POPLITEAL BYPASS Right 07/20/2017    Dr. Palmer   • INCISIONAL HERNIA REPAIR  09/28/2016    lap. Dr. West   • MOLE  REMOVAL Right     arm   • OTHER SURGICAL HISTORY      laser surgery X 2   • PARTIAL HYSTERECTOMY     • THYROIDECTOMY, PARTIAL      goiter removed 1/2 thyroid       Past Medical History:   Diagnosis Date   • Arthritis    • Atrial fibrillation (CMS/HCC)    • Bronchitis    • Chickenpox    • COPD (chronic obstructive pulmonary disease) (CMS/HCC)    • Cramps of lower extremity     bilateral with weakness   • Heart disease    • Hepatitis C 2013    treatment x 11 months   • High cholesterol    • History of degenerative disc disease    • HLD (hyperlipidemia)    • HTN (hypertension)    • Hypothyroidism    • Measles    • Seizure disorder (CMS/HCC)    • Skin melanoma (CMS/HCC) 2013    s/p lymph node removal on the right   • Sleep apnea    • Status post placement of implantable loop recorder 09/25/2019   • Stroke (CMS/HCC)    • Whooping cough        Family History   Problem Relation Age of Onset   • Hypertension Mother    • Cancer Mother    • Gout Mother    • Asthma Mother    • Cancer Father    • Heart disease Brother    • Diabetes Other    • Cancer Other    • Tuberculosis Other        Social History     Socioeconomic History   • Marital status:      Spouse name: Not on file   • Number of children: 2   • Years of education: Not on file   • Highest education level: Not on file   Tobacco Use   • Smoking status: Former Smoker     Last attempt to quit: 6/17/2009     Years since quitting: 10.3   • Smokeless tobacco: Never Used   Substance and Sexual Activity   • Alcohol use: No     Frequency: Monthly or less     Comment: beer   • Drug use: No   • Sexual activity: No         Procedures      Objective:       Physical Exam    /78 (BP Location: Left arm, Patient Position: Sitting, Cuff Size: Adult)   Pulse (!) 46   Wt 77.8 kg (171 lb 8 oz)   SpO2 96%   BMI 31.37 kg/m²   The patient is alert, oriented and in no distress.    Vital signs as noted above.    Head and neck revealed no carotid bruits or jugular venous  distension.  No thyromegaly or lymphadenopathy is present.    Lungs clear.  No wheezing.  Breath sounds are normal bilaterally.    Heart normal first and second heart sounds.  No murmur..  No pericardial rub is present.  No gallop is present.    Abdomen soft and nontender.  No organomegaly is present.    Extremities revealed good peripheral pulses without any pedal edema.    Skin warm and dry.  Pacemaker site looks normal except for a small pocket hematoma.  Mild bruising is present.    Musculoskeletal system is grossly normal.    CNS grossly normal.

## 2020-01-01 ENCOUNTER — OFFICE VISIT (OUTPATIENT)
Dept: RHEUMATOLOGY | Facility: CLINIC | Age: 75
End: 2020-01-01

## 2020-01-01 ENCOUNTER — OFFICE VISIT (OUTPATIENT)
Dept: CARDIOLOGY | Facility: CLINIC | Age: 75
End: 2020-01-01

## 2020-01-01 ENCOUNTER — CLINICAL SUPPORT NO REQUIREMENTS (OUTPATIENT)
Dept: CARDIOLOGY | Facility: CLINIC | Age: 75
End: 2020-01-01

## 2020-01-01 ENCOUNTER — LAB (OUTPATIENT)
Dept: LAB | Facility: HOSPITAL | Age: 75
End: 2020-01-01

## 2020-01-01 ENCOUNTER — OFFICE (AMBULATORY)
Dept: URBAN - METROPOLITAN AREA PATHOLOGY 4 | Facility: PATHOLOGY | Age: 75
End: 2020-01-01
Payer: MEDICARE

## 2020-01-01 ENCOUNTER — OFFICE (AMBULATORY)
Dept: URBAN - METROPOLITAN AREA CLINIC 51 | Facility: CLINIC | Age: 75
End: 2020-01-01
Payer: MEDICARE

## 2020-01-01 ENCOUNTER — HOSPITAL ENCOUNTER (OUTPATIENT)
Dept: MAMMOGRAPHY | Facility: HOSPITAL | Age: 75
Discharge: HOME | End: 2020-02-12
Admitting: NURSE PRACTITIONER

## 2020-01-01 ENCOUNTER — ON CAMPUS - OUTPATIENT (AMBULATORY)
Dept: URBAN - METROPOLITAN AREA HOSPITAL 2 | Facility: HOSPITAL | Age: 75
End: 2020-01-01
Payer: MEDICARE

## 2020-01-01 ENCOUNTER — OFFICE (AMBULATORY)
Dept: URBAN - METROPOLITAN AREA PATHOLOGY 4 | Facility: PATHOLOGY | Age: 75
End: 2020-01-01
Payer: COMMERCIAL

## 2020-01-01 ENCOUNTER — OFFICE (AMBULATORY)
Dept: URBAN - METROPOLITAN AREA CLINIC 64 | Facility: CLINIC | Age: 75
End: 2020-01-01

## 2020-01-01 ENCOUNTER — LAB REQUISITION (OUTPATIENT)
Dept: LAB | Facility: HOSPITAL | Age: 75
End: 2020-01-01

## 2020-01-01 VITALS
HEART RATE: 55 BPM | DIASTOLIC BLOOD PRESSURE: 66 MMHG | OXYGEN SATURATION: 95 % | TEMPERATURE: 97.6 F | DIASTOLIC BLOOD PRESSURE: 57 MMHG | RESPIRATION RATE: 19 BRPM | SYSTOLIC BLOOD PRESSURE: 85 MMHG | DIASTOLIC BLOOD PRESSURE: 61 MMHG | HEART RATE: 53 BPM | DIASTOLIC BLOOD PRESSURE: 58 MMHG | DIASTOLIC BLOOD PRESSURE: 64 MMHG | DIASTOLIC BLOOD PRESSURE: 59 MMHG | DIASTOLIC BLOOD PRESSURE: 76 MMHG | HEART RATE: 88 BPM | SYSTOLIC BLOOD PRESSURE: 117 MMHG | RESPIRATION RATE: 16 BRPM | HEART RATE: 74 BPM | OXYGEN SATURATION: 98 % | WEIGHT: 159 LBS | HEART RATE: 67 BPM | OXYGEN SATURATION: 97 % | HEART RATE: 87 BPM | OXYGEN SATURATION: 96 % | SYSTOLIC BLOOD PRESSURE: 92 MMHG | SYSTOLIC BLOOD PRESSURE: 115 MMHG | HEART RATE: 61 BPM | SYSTOLIC BLOOD PRESSURE: 104 MMHG | SYSTOLIC BLOOD PRESSURE: 154 MMHG | DIASTOLIC BLOOD PRESSURE: 65 MMHG | HEART RATE: 43 BPM | RESPIRATION RATE: 18 BRPM | SYSTOLIC BLOOD PRESSURE: 109 MMHG | HEIGHT: 61 IN

## 2020-01-01 VITALS
OXYGEN SATURATION: 97 % | HEIGHT: 61 IN | HEART RATE: 59 BPM | BODY MASS INDEX: 30.21 KG/M2 | WEIGHT: 160 LBS | DIASTOLIC BLOOD PRESSURE: 92 MMHG | SYSTOLIC BLOOD PRESSURE: 161 MMHG

## 2020-01-01 VITALS
SYSTOLIC BLOOD PRESSURE: 127 MMHG | DIASTOLIC BLOOD PRESSURE: 86 MMHG | BODY MASS INDEX: 30.4 KG/M2 | WEIGHT: 161 LBS | HEIGHT: 61 IN | HEART RATE: 47 BPM

## 2020-01-01 VITALS
SYSTOLIC BLOOD PRESSURE: 154 MMHG | WEIGHT: 160 LBS | DIASTOLIC BLOOD PRESSURE: 87 MMHG | HEIGHT: 61 IN | HEART RATE: 43 BPM

## 2020-01-01 DIAGNOSIS — Z79.899 LONG-TERM USE OF HIGH-RISK MEDICATION: ICD-10-CM

## 2020-01-01 DIAGNOSIS — D12.0 BENIGN NEOPLASM OF CECUM: ICD-10-CM

## 2020-01-01 DIAGNOSIS — K63.89 OTHER SPECIFIED DISEASES OF INTESTINE: ICD-10-CM

## 2020-01-01 DIAGNOSIS — M06.9 RHEUMATOID ARTHRITIS, INVOLVING UNSPECIFIED SITE, UNSPECIFIED RHEUMATOID FACTOR PRESENCE: ICD-10-CM

## 2020-01-01 DIAGNOSIS — Z95.0 PACEMAKER: ICD-10-CM

## 2020-01-01 DIAGNOSIS — D12.4 BENIGN NEOPLASM OF DESCENDING COLON: ICD-10-CM

## 2020-01-01 DIAGNOSIS — E78.2 MIXED HYPERLIPIDEMIA: ICD-10-CM

## 2020-01-01 DIAGNOSIS — M17.0 OSTEOARTHRITIS OF BOTH KNEES, UNSPECIFIED OSTEOARTHRITIS TYPE: ICD-10-CM

## 2020-01-01 DIAGNOSIS — M85.80 OSTEOPENIA WITH HIGH RISK OF FRACTURE: ICD-10-CM

## 2020-01-01 DIAGNOSIS — M54.50 LOW BACK PAIN, UNSPECIFIED BACK PAIN LATERALITY, UNSPECIFIED CHRONICITY, UNSPECIFIED WHETHER SCIATICA PRESENT: ICD-10-CM

## 2020-01-01 DIAGNOSIS — R00.1 BRADYCARDIA: ICD-10-CM

## 2020-01-01 DIAGNOSIS — K57.30 DIVERTICULOSIS OF LARGE INTESTINE WITHOUT PERFORATION OR ABS: ICD-10-CM

## 2020-01-01 DIAGNOSIS — I48.0 PAROXYSMAL ATRIAL FIBRILLATION (HCC): ICD-10-CM

## 2020-01-01 DIAGNOSIS — M06.9 RHEUMATOID ARTHRITIS, INVOLVING UNSPECIFIED SITE, UNSPECIFIED RHEUMATOID FACTOR PRESENCE: Primary | ICD-10-CM

## 2020-01-01 DIAGNOSIS — R00.1 BRADYCARDIA: Primary | ICD-10-CM

## 2020-01-01 DIAGNOSIS — R15.9 FULL INCONTINENCE OF FECES: ICD-10-CM

## 2020-01-01 DIAGNOSIS — Z86.010 PERSONAL HISTORY OF COLONIC POLYPS: ICD-10-CM

## 2020-01-01 DIAGNOSIS — I10 ESSENTIAL HYPERTENSION: ICD-10-CM

## 2020-01-01 DIAGNOSIS — D12.5 BENIGN NEOPLASM OF SIGMOID COLON: ICD-10-CM

## 2020-01-01 DIAGNOSIS — I49.5 SSS (SICK SINUS SYNDROME) (HCC): ICD-10-CM

## 2020-01-01 DIAGNOSIS — Z95.818 STATUS POST PLACEMENT OF IMPLANTABLE LOOP RECORDER: ICD-10-CM

## 2020-01-01 DIAGNOSIS — Z95.0 PACEMAKER: Primary | ICD-10-CM

## 2020-01-01 DIAGNOSIS — K63.5 POLYP OF COLON: ICD-10-CM

## 2020-01-01 DIAGNOSIS — Z95.0 PRESENCE OF PERMANENT CARDIAC PACEMAKER: ICD-10-CM

## 2020-01-01 DIAGNOSIS — Z79.52 LONG TERM (CURRENT) USE OF SYSTEMIC STEROIDS: ICD-10-CM

## 2020-01-01 DIAGNOSIS — I49.5 SICK SINUS SYNDROME (HCC): ICD-10-CM

## 2020-01-01 DIAGNOSIS — I48.0 PAROXYSMAL ATRIAL FIBRILLATION (HCC): Primary | ICD-10-CM

## 2020-01-01 DIAGNOSIS — Z12.31 SCREENING MAMMOGRAM, ENCOUNTER FOR: ICD-10-CM

## 2020-01-01 DIAGNOSIS — Z95.1 STATUS POST CORONARY ARTERY BYPASS GRAFT: ICD-10-CM

## 2020-01-01 DIAGNOSIS — K57.30 DIVERTICULOSIS OF LARGE INTESTINE WITHOUT PERFORATION OR ABSCESS WITHOUT BLEEDING: ICD-10-CM

## 2020-01-01 DIAGNOSIS — R32 UNSPECIFIED URINARY INCONTINENCE: ICD-10-CM

## 2020-01-01 DIAGNOSIS — M19.90 OSTEOARTHRITIS, UNSPECIFIED OSTEOARTHRITIS TYPE, UNSPECIFIED SITE: ICD-10-CM

## 2020-01-01 LAB
25(OH)D3 SERPL-MCNC: 37.3 NG/ML (ref 30–100)
ALBUMIN SERPL-MCNC: 4.2 G/DL (ref 3.5–5.2)
ALBUMIN SERPL-MCNC: 4.2 G/DL (ref 3.5–5.2)
ALBUMIN/GLOB SERPL: 1.3 G/DL
ALBUMIN/GLOB SERPL: 1.4 G/DL
ALP SERPL-CCNC: 102 U/L (ref 39–117)
ALP SERPL-CCNC: 105 U/L (ref 39–117)
ALT SERPL W P-5'-P-CCNC: 10 U/L (ref 1–33)
ALT SERPL W P-5'-P-CCNC: 11 U/L (ref 1–33)
ANION GAP SERPL CALCULATED.3IONS-SCNC: 10.8 MMOL/L (ref 5–15)
ANION GAP SERPL CALCULATED.3IONS-SCNC: 11.9 MMOL/L (ref 5–15)
AST SERPL-CCNC: 15 U/L (ref 1–32)
AST SERPL-CCNC: 19 U/L (ref 1–32)
BASOPHILS # BLD AUTO: 0.03 10*3/MM3 (ref 0–0.2)
BASOPHILS NFR BLD AUTO: 0.5 % (ref 0–1.5)
BILIRUB SERPL-MCNC: 0.6 MG/DL (ref 0.2–1.2)
BILIRUB SERPL-MCNC: 0.6 MG/DL (ref 0.2–1.2)
BUN BLD-MCNC: 11 MG/DL (ref 8–23)
BUN BLD-MCNC: 8 MG/DL (ref 8–23)
BUN/CREAT SERPL: 10.9 (ref 7–25)
BUN/CREAT SERPL: 9.4 (ref 7–25)
CALCIUM SPEC-SCNC: 9.4 MG/DL (ref 8.6–10.5)
CALCIUM SPEC-SCNC: 9.6 MG/DL (ref 8.6–10.5)
CHLORIDE SERPL-SCNC: 103 MMOL/L (ref 98–107)
CHLORIDE SERPL-SCNC: 105 MMOL/L (ref 98–107)
CO2 SERPL-SCNC: 26.2 MMOL/L (ref 22–29)
CO2 SERPL-SCNC: 27.1 MMOL/L (ref 22–29)
CREAT BLD-MCNC: 0.85 MG/DL (ref 0.57–1)
CREAT BLD-MCNC: 1.01 MG/DL (ref 0.57–1)
CRP SERPL-MCNC: 0.12 MG/DL (ref 0–0.5)
CRP SERPL-MCNC: 0.36 MG/DL (ref 0–0.5)
DEPRECATED RDW RBC AUTO: 39.7 FL (ref 37–54)
DEPRECATED RDW RBC AUTO: 42.2 FL (ref 37–54)
EOSINOPHIL # BLD AUTO: 0.05 10*3/MM3 (ref 0–0.4)
EOSINOPHIL # BLD MANUAL: 0.08 10*3/MM3 (ref 0–0.4)
EOSINOPHIL NFR BLD AUTO: 0.8 % (ref 0.3–6.2)
EOSINOPHIL NFR BLD MANUAL: 1.1 % (ref 0.3–6.2)
ERYTHROCYTE [DISTWIDTH] IN BLOOD BY AUTOMATED COUNT: 12 % (ref 12.3–15.4)
ERYTHROCYTE [DISTWIDTH] IN BLOOD BY AUTOMATED COUNT: 12.4 % (ref 12.3–15.4)
ERYTHROCYTE [SEDIMENTATION RATE] IN BLOOD: 4 MM/HR (ref 0–30)
ERYTHROCYTE [SEDIMENTATION RATE] IN BLOOD: 6 MM/HR (ref 0–30)
GFR SERPL CREATININE-BSD FRML MDRD: 54 ML/MIN/1.73
GFR SERPL CREATININE-BSD FRML MDRD: 66 ML/MIN/1.73
GI HISTOLOGY: A. UNSPECIFIED: (no result)
GI HISTOLOGY: B. UNSPECIFIED: (no result)
GI HISTOLOGY: C. UNSPECIFIED: (no result)
GI HISTOLOGY: D. SELECT: (no result)
GI HISTOLOGY: PDF REPORT: (no result)
GLOBULIN UR ELPH-MCNC: 2.9 GM/DL
GLOBULIN UR ELPH-MCNC: 3.2 GM/DL
GLUCOSE BLD-MCNC: 102 MG/DL (ref 65–99)
GLUCOSE BLD-MCNC: 114 MG/DL (ref 65–99)
HCT VFR BLD AUTO: 42.8 % (ref 34–46.6)
HCT VFR BLD AUTO: 46.3 % (ref 34–46.6)
HGB BLD-MCNC: 14.5 G/DL (ref 12–15.9)
HGB BLD-MCNC: 15.5 G/DL (ref 12–15.9)
IMM GRANULOCYTES # BLD AUTO: 0.01 10*3/MM3 (ref 0–0.05)
IMM GRANULOCYTES NFR BLD AUTO: 0.2 % (ref 0–0.5)
LAB AP CASE REPORT: NORMAL
LYMPHOCYTES # BLD AUTO: 1.74 10*3/MM3 (ref 0.7–3.1)
LYMPHOCYTES # BLD MANUAL: 1.62 10*3/MM3 (ref 0.7–3.1)
LYMPHOCYTES NFR BLD AUTO: 29 % (ref 19.6–45.3)
LYMPHOCYTES NFR BLD MANUAL: 22.3 % (ref 19.6–45.3)
LYMPHOCYTES NFR BLD MANUAL: 5.3 % (ref 5–12)
MCH RBC QN AUTO: 30.5 PG (ref 26.6–33)
MCH RBC QN AUTO: 31.4 PG (ref 26.6–33)
MCHC RBC AUTO-ENTMCNC: 33.5 G/DL (ref 31.5–35.7)
MCHC RBC AUTO-ENTMCNC: 33.9 G/DL (ref 31.5–35.7)
MCV RBC AUTO: 91.1 FL (ref 79–97)
MCV RBC AUTO: 92.6 FL (ref 79–97)
MONOCYTES # BLD AUTO: 0.38 10*3/MM3 (ref 0.1–0.9)
MONOCYTES # BLD AUTO: 0.64 10*3/MM3 (ref 0.1–0.9)
MONOCYTES NFR BLD AUTO: 10.6 % (ref 5–12)
NEUTROPHILS # BLD AUTO: 3.54 10*3/MM3 (ref 1.7–7)
NEUTROPHILS # BLD AUTO: 5.17 10*3/MM3 (ref 1.7–7)
NEUTROPHILS NFR BLD AUTO: 58.9 % (ref 42.7–76)
NEUTROPHILS NFR BLD MANUAL: 71.3 % (ref 42.7–76)
NRBC BLD AUTO-RTO: 0 /100 WBC (ref 0–0.2)
PATH REPORT.FINAL DX SPEC: NORMAL
PATH REPORT.GROSS SPEC: NORMAL
PLAT MORPH BLD: NORMAL
PLATELET # BLD AUTO: 186 10*3/MM3 (ref 140–450)
PLATELET # BLD AUTO: 196 10*3/MM3 (ref 140–450)
PMV BLD AUTO: 11.7 FL (ref 6–12)
PMV BLD AUTO: 11.8 FL (ref 6–12)
POTASSIUM BLD-SCNC: 4.4 MMOL/L (ref 3.5–5.2)
POTASSIUM BLD-SCNC: 5 MMOL/L (ref 3.5–5.2)
PROT SERPL-MCNC: 7.1 G/DL (ref 6–8.5)
PROT SERPL-MCNC: 7.4 G/DL (ref 6–8.5)
RBC # BLD AUTO: 4.62 10*6/MM3 (ref 3.77–5.28)
RBC # BLD AUTO: 5.08 10*6/MM3 (ref 3.77–5.28)
RBC MORPH BLD: NORMAL
SODIUM BLD-SCNC: 142 MMOL/L (ref 136–145)
SODIUM BLD-SCNC: 142 MMOL/L (ref 136–145)
WBC MORPH BLD: NORMAL
WBC NRBC COR # BLD: 6.01 10*3/MM3 (ref 3.4–10.8)
WBC NRBC COR # BLD: 7.25 10*3/MM3 (ref 3.4–10.8)

## 2020-01-01 PROCEDURE — 93280 PM DEVICE PROGR EVAL DUAL: CPT | Performed by: INTERNAL MEDICINE

## 2020-01-01 PROCEDURE — 93294 REM INTERROG EVL PM/LDLS PM: CPT | Performed by: INTERNAL MEDICINE

## 2020-01-01 PROCEDURE — 45380 COLONOSCOPY AND BIOPSY: CPT | Mod: 59,PT | Performed by: INTERNAL MEDICINE

## 2020-01-01 PROCEDURE — 99213 OFFICE O/P EST LOW 20 MIN: CPT | Performed by: INTERNAL MEDICINE

## 2020-01-01 PROCEDURE — 85652 RBC SED RATE AUTOMATED: CPT

## 2020-01-01 PROCEDURE — 45385 COLONOSCOPY W/LESION REMOVAL: CPT | Mod: PT | Performed by: INTERNAL MEDICINE

## 2020-01-01 PROCEDURE — 91120: CPT | Mod: 59 | Performed by: INTERNAL MEDICINE

## 2020-01-01 PROCEDURE — 88305 TISSUE EXAM BY PATHOLOGIST: CPT | Performed by: INTERNAL MEDICINE

## 2020-01-01 PROCEDURE — 85027 COMPLETE CBC AUTOMATED: CPT

## 2020-01-01 PROCEDURE — 86140 C-REACTIVE PROTEIN: CPT

## 2020-01-01 PROCEDURE — 80053 COMPREHEN METABOLIC PANEL: CPT

## 2020-01-01 PROCEDURE — 77067 SCR MAMMO BI INCL CAD: CPT

## 2020-01-01 PROCEDURE — 85007 BL SMEAR W/DIFF WBC COUNT: CPT

## 2020-01-01 PROCEDURE — 88305 TISSUE EXAM BY PATHOLOGIST: CPT | Mod: 26 | Performed by: INTERNAL MEDICINE

## 2020-01-01 PROCEDURE — 36415 COLL VENOUS BLD VENIPUNCTURE: CPT

## 2020-01-01 PROCEDURE — 85025 COMPLETE CBC W/AUTO DIFF WBC: CPT

## 2020-01-01 PROCEDURE — 82306 VITAMIN D 25 HYDROXY: CPT

## 2020-01-01 PROCEDURE — 99442 PR PHYS/QHP TELEPHONE EVALUATION 11-20 MIN: CPT | Performed by: INTERNAL MEDICINE

## 2020-01-01 PROCEDURE — 99214 OFFICE O/P EST MOD 30 MIN: CPT | Performed by: INTERNAL MEDICINE

## 2020-01-01 PROCEDURE — 77063 BREAST TOMOSYNTHESIS BI: CPT

## 2020-01-01 PROCEDURE — 91122: CPT | Performed by: INTERNAL MEDICINE

## 2020-01-01 PROCEDURE — 93296 REM INTERROG EVL PM/IDS: CPT | Performed by: INTERNAL MEDICINE

## 2020-01-01 RX ORDER — SULFASALAZINE 500 MG/1
1000 TABLET ORAL 2 TIMES DAILY
Qty: 360 TABLET | Refills: 0 | Status: SHIPPED | OUTPATIENT
Start: 2020-01-01 | End: 2020-01-01

## 2020-01-01 RX ORDER — SULFASALAZINE 500 MG/1
TABLET ORAL
Qty: 120 TABLET | Refills: 0 | Status: SHIPPED | OUTPATIENT
Start: 2020-01-01 | End: 2020-01-01 | Stop reason: SDUPTHER

## 2020-01-01 RX ORDER — TIZANIDINE 4 MG/1
TABLET ORAL
COMMUNITY
Start: 2019-01-01

## 2020-01-01 RX ORDER — ALENDRONATE SODIUM 70 MG/1
TABLET ORAL
Qty: 12 TABLET | Refills: 0 | Status: SHIPPED | OUTPATIENT
Start: 2020-01-01 | End: 2020-01-01

## 2020-01-01 RX ORDER — SULFASALAZINE 500 MG/1
TABLET ORAL
Qty: 120 TABLET | Refills: 0 | OUTPATIENT
Start: 2020-01-01

## 2020-01-01 RX ORDER — METOPROLOL SUCCINATE 25 MG/1
TABLET, EXTENDED RELEASE ORAL
Qty: 30 TABLET | Refills: 5 | Status: SHIPPED | OUTPATIENT
Start: 2020-01-01

## 2020-01-01 RX ORDER — ALENDRONATE SODIUM 70 MG/1
TABLET ORAL
Qty: 12 TABLET | Refills: 0 | Status: SHIPPED | OUTPATIENT
Start: 2020-01-01

## 2020-01-01 RX ORDER — AMLODIPINE BESYLATE 5 MG/1
TABLET ORAL
Qty: 90 TABLET | Refills: 1 | Status: SHIPPED | OUTPATIENT
Start: 2020-01-01

## 2020-01-01 RX ORDER — SULFASALAZINE 500 MG/1
TABLET ORAL
Qty: 120 TABLET | Refills: 0 | Status: SHIPPED | OUTPATIENT
Start: 2020-01-01

## 2020-01-01 RX ORDER — SULFASALAZINE 500 MG/1
TABLET ORAL
Qty: 120 TABLET | Refills: 0 | Status: SHIPPED | OUTPATIENT
Start: 2020-01-01 | End: 2020-01-01

## 2020-01-21 NOTE — PROGRESS NOTES
HPI:  The patient is a 74-year-old female who comes today follow-up for management of rheumatoid arthritis, osteoarthritis.  Her treatment consists of sulfasalazine 500 mg 2 tablets p.o. twice daily.  She is also known to have osteopenia with high risk fracture and takes Fosamax once a week regularly plus calcium and vitamin D.    Overall she feels well as far as her inflammatory arthritis, she has had more stiffness in her hands in the last few months as it got colder.  Denies joint swelling, she has about 50 minutes of morning stiffness.    In the last month or so, she has had problems with leg cramps, she was seen by her primary care provider who prescribed her a muscle relaxant which is helping somewhat.  Denies fever or chills, she has been intentionally trying to lose weight, she has dry eyes and dry mouth that started after she was prescribed the muscle relaxant.  Denies oral nasal ulcers, she has shortness of breath with recent change, this is attributed to her COPD.  Denies skin rashes.  All other systems reviewed and they were negative.    Laboratories reviewed for this visit done 1/15/2020 show a ESR of 4, CRP 0.36, creatinine 1.01, slightly increased compared to the last creatinine level, liver function test normal, CBC for the most part unremarkable.    Social and family history reviewed and unchanged.      Rheumatologic history:  1. Rheumatoid arthritis diagnosed 01/2018,  Vectra DA November 2018 31  Sulfasalazine started 01/2019     2.  CTS bilaterally  Wrists splints at night     3. Trigger finger in the 3rd digit of the Lt.  Hand     4.  Osteoarthritis of both knees      5. Raynaud's     6. XENA (+) 1:320 with homogeneous pattern, anti CCP equivocal, anti cardiolipin IgM 41     CONSIDERATIONS IN HER MANAGEMENT, HISTORY OF T SPOT (+) FOR SEVERAL YEARS, HEPATITIS-C STATUS POST TREATMENT, HISTORY OF MELANOMA            Past Medical History:   Diagnosis Date   • Arthritis    • Atrial fibrillation  (CMS/[facility])    • Bronchitis    • Chickenpox    • COPD (chronic obstructive pulmonary disease) (CMS/[facility])    • Cramps of lower extremity     bilateral with weakness   • Heart disease    • Hepatitis C 2013    treatment x 11 months   • High cholesterol    • History of degenerative disc disease    • HLD (hyperlipidemia)    • HTN (hypertension)    • Hypothyroidism    • Measles    • Seizure disorder (CMS/HCC)    • Skin melanoma (CMS/[facility]) 2013    s/p lymph node removal on the right   • Sleep apnea    • Status post placement of implantable loop recorder 09/25/2019   • Stroke (CMS/[facility])    • Whooping cough        Current Outpatient Medications   Medication Sig Dispense Refill   • alendronate (FOSAMAX) 70 MG tablet TAKE ONE TABLET BY MOUTH ONE TIME PER WEEK 12 tablet 0   • amLODIPine (NORVASC) 5 MG tablet Take 1 tablet by mouth Daily. For high blood pressure. 30 tablet 5   • aspirin 81 MG chewable tablet Chew 1 tablet Daily. 30 tablet 0   • atorvastatin (LIPITOR) 10 MG tablet Take 20 mg by mouth Every Night.     • B Complex Vitamins (VITAMIN B COMPLEX) capsule capsule Take 1 capsule by mouth Daily.     • Cholecalciferol 42791 units tablet Take 2,000 Units by mouth 2 (Two) Times a Day.     • clopidogrel (PLAVIX) 75 MG tablet Take 75 mg by mouth Daily.     • cyanocobalamin (VITAMIN B-12) 250 MCG tablet Take 250 mcg by mouth Daily.     • ezetimibe (ZETIA) 10 MG tablet Take 10 mg by mouth Daily.     • levETIRAcetam (KEPPRA) 500 MG tablet Take 500 mg by mouth 2 (Two) Times a Day.     • levothyroxine (SYNTHROID, LEVOTHROID) 25 MCG tablet Take 25 mcg by mouth Daily.     • metoprolol succinate XL (TOPROL-XL) 25 MG 24 hr tablet Take 1 tablet by mouth Daily. 30 tablet 5   • pantoprazole (PROTONIX) 20 MG EC tablet Take 20 mg by mouth Daily.     • pregabalin (LYRICA) 100 MG capsule Take 100 mg by mouth 3 (Three) Times a Day.     • sulfaSALAzine (AZULFIDINE) 500 MG tablet TAKE TWO TABLETS BY MOUTH TWO TIMES A  tablet 0     No current  facility-administered medications for this visit.        Physical exam:    Vitals:    01/21/20 0849   BP: 127/86   Pulse: (!) 47        GENERAL: Well-developed, well-nourished in no acute distress. Alert and oriented x3.  HEENT: Normocephalic, atraumatic. Pupils are equal, round, and reactive to light. Extraocular muscles are intact. Mucous membranes are pink and moist. Nostrils are clear.   NECK: Supple without lymphadenopathy.  LUNGS: Clear to auscultation bilaterally.  HEART: Regular rate and rhythm without murmur, rub or gallop.  CHEST: Respirations easy and unlabored.  EXTREMITIES: No cyanosis, edema or clubbing.  SKIN: Warm, dry and intact.  MSK: Heberden's and Donald nodes.  No major joint tenderness, no synovitis.    Assessment:  1.  Rheumatoid arthritis.  NOS.  Overall doing well with current therapy of sulfasalazine.  Labs reviewed, ESR was 4.  Patient in remission.  Continue with the same treatment with no changes.    2.  Long-term high-risk medications, the patient is on medication for management of inflammatory arthritis.  I am monitoring for side effects.  Recommend to keep up-to-date with her age-appropriate cancer screening and vaccinations.      Cancer screening:  Colonoscopy Due 1/29/2020  ;PAP;  2017  Mammogram; 11/13/2018   Bone health: calcium and vitamin D: YES, DXA scan;  YES 5/15/17  Vaccines: Flu: NO  ;PNA: NO ;Zoster: NO  (refuses vacc)   X-rays of the chest; 10/11/2019 Hands; 11/6/18  Feet: Rt Foot 11/6/18  Hepatitis panel, HIV, QTB/PPD:  HEp, HIV and TB 11/6/18    #3 osteoarthritis.  Deteriorated.  Feeling a little bit more symptomatic with weather changes.  Compounded cream to be used twice daily, Tylenol arthritis to be used when needed.    4.  Cramps in the lower extremities.  New to me.  On a muscle relaxant.  Recommended to the patient to keep herself hydrated.  Continue to follow with PCP.    5.  Knee pain.  As discussed in prior visit.  She does not want to go for PT or Visco  supplementation, she does not want to proceed with corticosteroid injections.  Discussed with her regarding referral to Ortho, she will contact the office if she decides to proceed with this recommendation.    Plan:  Continue with sulfasalazine 500 mg 2 tablets p.o. twice daily  Compounded cream to be applied on affected joints twice daily  Okay to take Tylenol arthritis when needed only  RTC in 4 months or sooner if needed      Orders Placed This Encounter   Procedures   • Comprehensive Metabolic Panel     Standing Status:   Future   • Sedimentation Rate     Standing Status:   Future   • C-reactive Protein     Standing Status:   Future   • Vitamin D 25 Hydroxy     Standing Status:   Future     Standing Expiration Date:   1/21/2021   • CBC & Differential     Standing Status:   Future     Order Specific Question:   Manual Differential     Answer:   No

## 2020-01-29 PROBLEM — K63.89 OTHER SPECIFIED DISEASES OF INTESTINE: Status: ACTIVE | Noted: 2020-01-01

## 2020-02-11 NOTE — PROGRESS NOTES
Encounter Date:02/11/2020  Last seen 10/31/2019      Patient ID: Delphine Rob is a 74 y.o. female.    Chief Complaint:  Status post CABG  Status post pacemaker  History of intermittent atrial fibrillation  Hypertension  Dyslipidemia  Hypothyroidism      History of Present Illness  Since I have last seen, the patient has been without any chest discomfort , unusual shortness of breath, palpitations, dizziness or syncope.  Denies having any headache ,abdominal pain ,nausea, vomiting , diarrhea constipation, loss of weight or loss of appetite.  Denies having any excessive bruising ,hematuria or blood in the stool.    Pacemaker made a significant difference for her.    Review of all systems negative except as indicated  Assessment and Plan       /////////////////  impression  ---------------------------  - Status post permanent dual-chamber pacemaker implantation 10/11/2019 ([place] [employer] MRI compatible) and removal of loop recorder     - Status post loop recorder placement ([employer] 9/25/2019).  Abnormal rhythm with 5-second pause on loop recorder tracing.  Patient had multiple episodes of nausea dizziness near syncope and tachycardia bradycardia syndrome.  History of intermittent atrial fibrillation     -Status post CABG 01/20/2010 at [facility].  Status post stent placement after CABG ( details not available).     Cardiac catheterization and coronary angiography 6/19/2019 revealed  Left ventricle size and contractility is normal with proximal inferior wall hypokinesis.  Ejection fraction is 55%.  Left main artery is normal.  Left anterior descending artery is noted in the midsegment with filling of the LAD through LIMA.  Circumflex coronary artery is normal  Right coronary artery is a very small caliber vessel with 99% disease in the midsegment with filling of the distal vessel through collaterals.  RAUSCH to LAD is patent  SVG to marginal branch is patent  SVG to RCA is totally  occluded.     Echocardiogram 17 2019 revealed mild mitral regurgitation and normal left ventricular function.    Carri scan Cardiolite test is negative for myocardial ischemia 11/13/2017     -Intermittent atrial fibrillation.  Patient is maintaining sinus rhythm.       -History of  stroke from which patient has recovered.      -Hypertension dyslipidemia and hypothyroidism      -Status post thyroidectomy for cyst cholecystectomy and hysterectomy.      -History of hepatitis-C      -Allergy to penicillin      -Former smoker quit smoking October 2013.      -Status post  Malignant melanoma removal from right elbow area.  Patient did not need chemotherapy     -history of peripheral vascular disease.  CTA 06/30/2017 revealed right superficial femoral artery occlusion  ---------------.  Plan  ----------------------  Status post pacemaker implantation.  10/11/2019 ([place] MRI compatible  Patient is not having any angina pectoris or congestive heart failure  Interrogation of the pacemaker reveal excellent pacing parameters.  Pre-pacemaker symptoms have improved significantly.  Continue amlodipine.  Medications were reviewed and updated.  Follow-up in office in 6 months with pacemaker interrogation.  Further plan will depend on patient's progress.  ]]]]]]]]]]]]]]]]         Diagnosis Plan   1. Paroxysmal atrial fibrillation (CMS/HCC)     2. Bradycardia     3. Mixed hyperlipidemia     4. Essential hypertension     5. Presence of permanent cardiac pacemaker     6. Status post placement of implantable loop recorder     7. Status post coronary artery bypass graft     LAB RESULTS (LAST 7 DAYS)    CBC        BMP        CMP         BNP        TROPONIN        CoAg        Creatinine Clearance  Estimated Creatinine Clearance: 45.2 mL/min (A) (by C-G formula based on SCr of 1.01 mg/dL (H)).    ABG        Radiology  No radiology results for the last day                The following portions of the patient's history were reviewed  and updated as appropriate: allergies, current medications, past family history, past medical history, past social history, past surgical history and problem list.    Review of Systems   Constitution: Negative for malaise/fatigue.   Cardiovascular: Negative for chest pain and palpitations.   Respiratory: Positive for shortness of breath.    Skin: Negative for rash.   Neurological: Negative for numbness. Light-headedness: occassional.         Current Outpatient Medications:   •  alendronate (FOSAMAX) 70 MG tablet, TAKE ONE TABLET BY MOUTH ONE TIME PER WEEK, Disp: 12 tablet, Rfl: 0  •  amLODIPine (NORVASC) 5 MG tablet, Take 1 tablet by mouth Daily. For high blood pressure., Disp: 30 tablet, Rfl: 5  •  aspirin 81 MG chewable tablet, Chew 1 tablet Daily., Disp: 30 tablet, Rfl: 0  •  atorvastatin (LIPITOR) 10 MG tablet, Take 20 mg by mouth Every Night., Disp: , Rfl:   •  B Complex Vitamins (VITAMIN B COMPLEX) capsule capsule, Take 1 capsule by mouth Daily., Disp: , Rfl:   •  Cholecalciferol 14283 units tablet, Take 2,000 Units by mouth 2 (Two) Times a Day., Disp: , Rfl:   •  clopidogrel (PLAVIX) 75 MG tablet, Take 75 mg by mouth Daily., Disp: , Rfl:   •  cyanocobalamin (VITAMIN B-12) 250 MCG tablet, Take 250 mcg by mouth Daily., Disp: , Rfl:   •  ezetimibe (ZETIA) 10 MG tablet, Take 10 mg by mouth Daily., Disp: , Rfl:   •  levETIRAcetam (KEPPRA) 500 MG tablet, Take 500 mg by mouth 2 (Two) Times a Day., Disp: , Rfl:   •  levothyroxine (SYNTHROID, LEVOTHROID) 25 MCG tablet, Take 25 mcg by mouth Daily., Disp: , Rfl:   •  metoprolol succinate XL (TOPROL-XL) 25 MG 24 hr tablet, Take 1 tablet by mouth Daily., Disp: 30 tablet, Rfl: 5  •  pantoprazole (PROTONIX) 20 MG EC tablet, Take 20 mg by mouth Daily., Disp: , Rfl:   •  pregabalin (LYRICA) 100 MG capsule, Take 100 mg by mouth 3 (Three) Times a Day., Disp: , Rfl:   •  sulfaSALAzine (AZULFIDINE) 500 MG tablet, Take 2 tablets by mouth 2 (Two) Times a Day., Disp: 360 tablet,  Rfl: 0  •  tiZANidine (ZANAFLEX) 4 MG tablet, , Disp: , Rfl:     Allergies   Allergen Reactions   • Penicillamine Unknown (See Comments)   • Penicillins Hives       Family History   Problem Relation Age of Onset   • Hypertension Mother    • Cancer Mother    • Gout Mother    • Asthma Mother    • Cancer Father    • Heart disease Brother    • Diabetes Other    • Cancer Other    • Tuberculosis Other        Past Surgical History:   Procedure Laterality Date   • APPENDECTOMY     • AXILLARY NODE DISSECTION Right 05/2014   • CARDIAC CATHETERIZATION N/A 6/19/2019    Procedure: Left Heart Cath;  Surgeon: Real Winn MD;  Location:  [facility];  Service: Cardiovascular   • CARDIAC CATHETERIZATION N/A 6/19/2019    Procedure: Saphenous Vein Graft;  Surgeon: Real Winn MD;  Location:  [facility];  Service: Cardiovascular   • CARDIAC CATHETERIZATION N/A 6/19/2019    Procedure: Left ventriculography;  Surgeon: Real Winn MD;  Location:  [facility];  Service: Cardiovascular   • CARDIAC CATHETERIZATION N/A 6/19/2019    Procedure: Coronary angiography;  Surgeon: Real Winn MD;  Location: [facility] CATH INVASIVE LOCATION;  Service: Cardiovascular   • CARDIAC CATHETERIZATION N/A 10/11/2019    Procedure: Thoracic venogram;  Surgeon: Real Winn MD;  Location: [facility] CATH INVASIVE LOCATION;  Service: Cardiovascular   • CARDIAC ELECTROPHYSIOLOGY PROCEDURE Left 10/11/2019    Procedure: pacemaker dual;  Surgeon: Real Winn MD;  Location: [facility] CATH INVASIVE LOCATION;  Service: Cardiovascular   • CARDIAC ELECTROPHYSIOLOGY PROCEDURE N/A 10/11/2019    Procedure: Loop recorder removal;  Surgeon: Real Winn MD;  Location: [facility] CATH INVASIVE LOCATION;  Service: Cardiovascular   • CATARACT EXTRACTION  01/2017    x 2   • CHOLECYSTECTOMY  2004   • COLONOSCOPY  04/05/2017   • CORONARY ANGIOPLASTY WITH STENT PLACEMENT      stent placement after CABG   •  "CORONARY ARTERY BYPASS GRAFT  01/20/2010   • CORONARY ARTERY BYPASS GRAFT     • FEMORAL POPLITEAL BYPASS Right 07/20/2017    Dr. Palmer   • INCISIONAL HERNIA REPAIR  09/28/2016    lap. Dr. West   • MOLE REMOVAL Right     arm   • OTHER SURGICAL HISTORY      laser surgery X 2   • SUBTOTAL HYSTERECTOMY     • THYROIDECTOMY, PARTIAL      goiter removed 1/2 thyroid       Past Medical History:   Diagnosis Date   • Arthritis    • Atrial fibrillation (CMS/HCC)    • Bronchitis    • Chickenpox    • COPD (chronic obstructive pulmonary disease) (CMS/HCC)    • Cramps of lower extremity     bilateral with weakness   • Heart disease    • Hepatitis C 2013    treatment x 11 months   • High cholesterol    • History of degenerative disc disease    • HLD (hyperlipidemia)    • HTN (hypertension)    • Hypothyroidism    • Measles    • Seizure disorder (CMS/HCC)    • Skin melanoma (CMS/HCC) 2013    s/p lymph node removal on the right   • Sleep apnea    • Status post placement of implantable loop recorder 09/25/2019   • Stroke (CMS/HCC)    • Whooping cough        Family History   Problem Relation Age of Onset   • Hypertension Mother    • Cancer Mother    • Gout Mother    • Asthma Mother    • Cancer Father    • Heart disease Brother    • Diabetes Other    • Cancer Other    • Tuberculosis Other        Social History     Socioeconomic History   • Marital status:      Spouse name: Not on file   • Number of children: 2   • Years of education: Not on file   • Highest education level: Not on file   Tobacco Use   • Smoking status: Former Smoker     Last attempt to quit: 6/17/2009     Years since quitting: 10.6   • Smokeless tobacco: Never Used   Substance and Sexual Activity   • Alcohol use: No     Frequency: Monthly or less     Comment: beer   • Drug use: No   • Sexual activity: Never         Procedures      Objective:       Physical Exam    /92   Pulse 59   Ht 154.9 cm (61\")   Wt 72.6 kg (160 lb)   SpO2 97%   BMI 30.23 kg/m² "   The patient is alert, oriented and in no distress.    Vital signs as noted above.    Head and neck revealed no carotid bruits or jugular venous distension.  No thyromegaly or lymphadenopathy is present.    Lungs clear.  No wheezing.  Breath sounds are normal bilaterally.    Heart normal first and second heart sounds.  No murmur..  No pericardial rub is present.  No gallop is present.    Abdomen soft and nontender.  No organomegaly is present.    Extremities revealed good peripheral pulses without any pedal edema.    Skin warm and dry.  Pacemaker site looks normal.    Musculoskeletal system is grossly normal.    CNS grossly normal.

## 2020-05-21 NOTE — PROGRESS NOTES
You have chosen to receive care through a telephone visit. Do you consent to use a telephone visit for your medical care today? Yes    HPI:  The patient is a very pleasant 74-year-old female who suffers from rheumatoid arthritis.  She was seen in the office January 21, 2020.  Her treatment consists of sulfasalazine 500 mg p.o. twice daily.  She is compliant, denies side effects.  She also suffers from osteoarthritis of multiple joints, she applies a compounded cream with some results, she takes Tylenol every now and then.    In the last month or so she has been feeling a lot more symptomatic, she has noticed swelling in her left wrist and also moderate to severe pain in her right thumb.  The pain varies in intensity, if she is not active she feels better but if she attempts to move her hands her pain is a lot worse.    The patient denies fever or chills, no chest pain, shortness of breath or cough, abdominal pain, nausea, vomiting, diarrhea or constipation.  All other systems were reviewed and they were negative.    Laboratories reviewed for this office visit show a vitamin D of 37.3, normal creatinine and normal liver function test, ESR 6, CRP 0.12, CBC unremarkable.      Past Medical History:   Diagnosis Date   • Arthritis    • Atrial fibrillation (CMS/HCC)    • Bronchitis    • Chickenpox    • COPD (chronic obstructive pulmonary disease) (CMS/HCC)    • Cramps of lower extremity     bilateral with weakness   • Heart disease    • Hepatitis C 2013    treatment x 11 months   • High cholesterol    • History of degenerative disc disease    • HLD (hyperlipidemia)    • HTN (hypertension)    • Hypothyroidism    • Measles    • Seizure disorder (CMS/HCC)    • Skin melanoma (CMS/HCC)     s/p lymph node removal on the right, all lymph nodes on right   • Sleep apnea    • Status post placement of implantable loop recorder 09/25/2019   • Stroke (CMS/HCC)    • Whooping cough        Current Outpatient Medications   Medication Sig  Dispense Refill   • alendronate (FOSAMAX) 70 MG tablet TAKE ONE TABLET BY MOUTH ONCE WEEKLY 12 tablet 0   • amLODIPine (NORVASC) 5 MG tablet TAKE ONE TABLET BY MOUTH ONCE DAILY FOR BLOOD PRESSURE 90 tablet 1   • aspirin 81 MG chewable tablet Chew 1 tablet Daily. 30 tablet 0   • atorvastatin (LIPITOR) 10 MG tablet Take 20 mg by mouth Every Night.     • B Complex Vitamins (VITAMIN B COMPLEX) capsule capsule Take 1 capsule by mouth Daily.     • Cholecalciferol 49213 units tablet Take 2,000 Units by mouth 2 (Two) Times a Day.     • clopidogrel (PLAVIX) 75 MG tablet Take 75 mg by mouth Daily.     • cyanocobalamin (VITAMIN B-12) 250 MCG tablet Take 250 mcg by mouth Daily.     • ezetimibe (ZETIA) 10 MG tablet Take 10 mg by mouth Daily.     • levETIRAcetam (KEPPRA) 500 MG tablet Take 500 mg by mouth 2 (Two) Times a Day.     • levothyroxine (SYNTHROID, LEVOTHROID) 25 MCG tablet Take 25 mcg by mouth Daily.     • metoprolol succinate XL (TOPROL-XL) 25 MG 24 hr tablet TAKE ONE TABLET BY MOUTH ONCE DAILY 30 tablet 5   • pantoprazole (PROTONIX) 20 MG EC tablet Take 20 mg by mouth Daily.     • pregabalin (LYRICA) 100 MG capsule Take 100 mg by mouth 3 (Three) Times a Day.     • sulfaSALAzine (AZULFIDINE) 500 MG tablet TAKE TWO TABLETS BY MOUTH TWO TIMES A  tablet 0   • tiZANidine (ZANAFLEX) 4 MG tablet        No current facility-administered medications for this visit.        Physical exam:    There were no vitals filed for this visit.   Alert, awake, oriented.  Cooperative with a phone visit.  Not in acute distress.    Assessment:  1.  Rheumatoid arthritis.  NOS.  Claims to have swelling in her left wrist and pain in her right thumb.  Probably a combination of active inflammatory arthritis and osteoarthritis.  We will send her short course of corticosteroids.  We will continue with sulfasalazine and is.    2.  Long-term high-risk medications, the patient is on medications for management of inflammatory arthritis.  I am  monitoring for side effects.    Cancer screening:  Colonoscopy Due 1/29/2020  ;PAP;  2017  Mammogram; 11/13/2018   Bone health: calcium and vitamin D: YES, DXA scan 5/23/19  Vaccines: Flu: NO  ;PNA: NO ;Zoster: NO  (refuses vacc)   X-rays of the chest; 10/11/2019 Hands; 11/6/18  Feet: Rt Foot 11/6/18  Hepatitis panel 11/6/18, HIV 11/6/18, QTB/PPD positive:  11/6/18    3.  Osteoarthritis.  Deteriorated.  Recommend to the patient to continue with a compound cream to be applied twice to 3 times a day.  Discussed again with her regarding using Tylenol arthritis.  Recommended to the patient that once she is seen in the office for an office visit, a corticosteroid injection in the left wrist and the right CMC joint can be entertained.    Plan:  Continue with sulfasalazine vitamin grams 2 tablets p.o. twice daily  Continue with a compounded cream to be applied twice daily or 3 times a day on affected joints  Use Tylenol arthritis as directed  Start prednisone 15 mg p.o. daily for 1 week then continue with 10 mg p.o. daily for 1 week then continue with 5 mg p.o. daily for 1 week, then use 5 mg p.o. every other day and then stop  Continue to use PPI for GI protection  RTC 2 months sooner if needed.  The patient will establish care with another rheumatologist as I will be moving out of the area.    15 minutes were spent in this encounter

## 2025-01-25 NOTE — PLAN OF CARE
Problem: Patient Care Overview  Goal: Interprofessional Rounds/Family Conf  Outcome: Ongoing (interventions implemented as appropriate)        
Problem: Patient Care Overview  Goal: Plan of Care Review  Outcome: Ongoing (interventions implemented as appropriate)   10/11/19 5552   Coping/Psychosocial   Plan of Care Reviewed With patient   Plan of Care Review   Progress improving     Goal: Individualization and Mutuality  Outcome: Ongoing (interventions implemented as appropriate)        
8 (severe pain)

## (undated) DEVICE — ST ACC MICROPUNCTURE STFF/CANN PLAT/TP 4F 21G 40CM

## (undated) DEVICE — GW DIAG EMERALD HEPCOAT MOVE JTIP STD .035 3MM 150CM

## (undated) DEVICE — PINNACLE INTRODUCER SHEATH: Brand: PINNACLE

## (undated) DEVICE — CATH DIAG IMPULSE FR4 5F 100CM

## (undated) DEVICE — CATH DIAG IMPULSE PIG 5F 100CM

## (undated) DEVICE — PACEMAKER CDS: Brand: MEDLINE INDUSTRIES, INC.

## (undated) DEVICE — INTRO SHEATH PRELUDE SNAP .038 6F 13CM W/SDPRT

## (undated) DEVICE — UNDYED BRAIDED (POLYGLACTIN 910), SYNTHETIC ABSORBABLE SUTURE: Brand: COATED VICRYL

## (undated) DEVICE — INTRO SHEATH PRELUDE SNAP .038 9F 13CM W/SDPRT BLK

## (undated) DEVICE — ADHS LIQ MASTISOL 2/3ML

## (undated) DEVICE — CATH DIAG IMPULSE FL4 5F 100CM

## (undated) DEVICE — 3M™ STERI-STRIP™ REINFORCED ADHESIVE SKIN CLOSURES, R1547, 1/2 IN X 4 IN (12 MM X 100 MM), 6 STRIPS/ENVELOPE: Brand: 3M™ STERI-STRIP™

## (undated) DEVICE — PK TRY HEART CATH 50

## (undated) DEVICE — VIOLET BRAIDED (POLYGLACTIN 910), SYNTHETIC ABSORBABLE SUTURE: Brand: COATED VICRYL

## (undated) DEVICE — SUT SILK 2/0 FS BLK 18IN 685G